# Patient Record
Sex: FEMALE | Race: WHITE | NOT HISPANIC OR LATINO | Employment: FULL TIME | URBAN - METROPOLITAN AREA
[De-identification: names, ages, dates, MRNs, and addresses within clinical notes are randomized per-mention and may not be internally consistent; named-entity substitution may affect disease eponyms.]

---

## 2019-07-21 ENCOUNTER — HOSPITAL ENCOUNTER (EMERGENCY)
Facility: HOSPITAL | Age: 49
Discharge: HOME | End: 2019-07-21
Attending: EMERGENCY MEDICINE | Admitting: EMERGENCY MEDICINE

## 2019-07-21 ENCOUNTER — APPOINTMENT (OUTPATIENT)
Dept: GENERAL RADIOLOGY | Facility: HOSPITAL | Age: 49
End: 2019-07-21

## 2019-07-21 VITALS
RESPIRATION RATE: 20 BRPM | TEMPERATURE: 98.8 F | BODY MASS INDEX: 32.3 KG/M2 | WEIGHT: 201 LBS | HEART RATE: 87 BPM | OXYGEN SATURATION: 98 % | SYSTOLIC BLOOD PRESSURE: 137 MMHG | HEIGHT: 66 IN | DIASTOLIC BLOOD PRESSURE: 83 MMHG

## 2019-07-21 DIAGNOSIS — S86.911A KNEE STRAIN, RIGHT, INITIAL ENCOUNTER: ICD-10-CM

## 2019-07-21 DIAGNOSIS — M25.561 ARTHRALGIA OF RIGHT KNEE: ICD-10-CM

## 2019-07-21 DIAGNOSIS — S76.911A MUSCLE STRAIN OF RIGHT THIGH, INITIAL ENCOUNTER: Primary | ICD-10-CM

## 2019-07-21 PROCEDURE — 73560 X-RAY EXAM OF KNEE 1 OR 2: CPT

## 2019-07-21 PROCEDURE — 99284 EMERGENCY DEPT VISIT MOD MDM: CPT

## 2019-07-21 PROCEDURE — 73552 X-RAY EXAM OF FEMUR 2/>: CPT

## 2019-07-21 RX ORDER — METAXALONE 800 MG/1
800 TABLET ORAL 3 TIMES DAILY PRN
Qty: 21 TABLET | Refills: 0 | Status: SHIPPED | OUTPATIENT
Start: 2019-07-21 | End: 2023-02-20

## 2019-07-21 RX ORDER — ONDANSETRON 4 MG/1
4 TABLET, FILM COATED ORAL EVERY 8 HOURS PRN
COMMUNITY

## 2019-07-21 RX ORDER — GABAPENTIN 300 MG/1
300 CAPSULE ORAL 3 TIMES DAILY
COMMUNITY

## 2019-07-21 RX ORDER — CLONAZEPAM 1 MG/1
1 TABLET ORAL 2 TIMES DAILY PRN
COMMUNITY

## 2019-07-21 RX ORDER — DULOXETIN HYDROCHLORIDE 60 MG/1
60 CAPSULE, DELAYED RELEASE ORAL DAILY
COMMUNITY
End: 2023-01-23

## 2019-07-21 RX ORDER — BUTALBITAL, ACETAMINOPHEN AND CAFFEINE 50; 325; 40 MG/1; MG/1; MG/1
1 TABLET ORAL EVERY 4 HOURS PRN
COMMUNITY
End: 2023-01-23

## 2019-07-21 RX ORDER — ACETAMINOPHEN 500 MG
1000 TABLET ORAL ONCE
Status: COMPLETED | OUTPATIENT
Start: 2019-07-21 | End: 2019-07-21

## 2019-07-21 RX ORDER — BUSPIRONE HYDROCHLORIDE 10 MG/1
10 TABLET ORAL 2 TIMES DAILY
COMMUNITY

## 2019-07-21 RX ORDER — NAPROXEN 375 MG/1
375 TABLET ORAL 2 TIMES DAILY PRN
Qty: 14 TABLET | Refills: 0 | Status: SHIPPED | OUTPATIENT
Start: 2019-07-21 | End: 2020-07-30

## 2019-07-21 RX ORDER — ALBUTEROL SULFATE 90 UG/1
2 AEROSOL, METERED RESPIRATORY (INHALATION) EVERY 4 HOURS PRN
COMMUNITY

## 2019-07-21 RX ORDER — FUROSEMIDE 40 MG/1
40 TABLET ORAL DAILY PRN
COMMUNITY

## 2019-07-21 RX ORDER — PROPRANOLOL HYDROCHLORIDE 60 MG/1
60 TABLET ORAL 2 TIMES DAILY
COMMUNITY
End: 2023-02-20

## 2019-07-21 RX ORDER — ALPRAZOLAM 0.5 MG/1
0.5 TABLET ORAL NIGHTLY PRN
COMMUNITY

## 2019-07-21 RX ORDER — MELOXICAM 7.5 MG/1
7.5 TABLET ORAL DAILY
COMMUNITY

## 2019-07-21 RX ORDER — ESZOPICLONE 3 MG/1
1 TABLET, FILM COATED ORAL DAILY
COMMUNITY
End: 2023-01-24 | Stop reason: SDUPTHER

## 2019-07-21 RX ORDER — ASPIRIN 81 MG/1
81 TABLET ORAL DAILY
COMMUNITY

## 2019-07-21 RX ORDER — PRAZOSIN HYDROCHLORIDE 5 MG/1
1 CAPSULE ORAL NIGHTLY
COMMUNITY
End: 2023-01-24 | Stop reason: SDUPTHER

## 2019-07-21 RX ORDER — SUMATRIPTAN 6 MG/.5ML
6 INJECTION, SOLUTION SUBCUTANEOUS ONCE
COMMUNITY
End: 2020-07-30

## 2019-07-21 RX ORDER — RIZATRIPTAN BENZOATE 10 MG/1
10 TABLET ORAL ONCE AS NEEDED
COMMUNITY

## 2019-07-21 RX ORDER — FLUCONAZOLE 200 MG/1
1 TABLET ORAL AS NEEDED
COMMUNITY

## 2019-07-21 RX ADMIN — ACETAMINOPHEN 1000 MG: 500 TABLET, FILM COATED ORAL at 21:25

## 2020-07-29 ENCOUNTER — TELEPHONE (OUTPATIENT)
Dept: CARDIOLOGY | Facility: CLINIC | Age: 50
End: 2020-07-29
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
## 2020-07-29 NOTE — TELEPHONE ENCOUNTER
Pt called with complaints of worsening chest pain and soa and would like to be seen sooner if possible. Pt appointment moved up from August. RN advised patient to go to ER if symptoms got worse before appointment. Pt verbalized understanding.

## 2020-07-30 ENCOUNTER — CONSULT (OUTPATIENT)
Dept: CARDIOLOGY | Facility: CLINIC | Age: 50
End: 2020-07-30

## 2020-07-30 VITALS
HEART RATE: 96 BPM | DIASTOLIC BLOOD PRESSURE: 86 MMHG | WEIGHT: 214.8 LBS | OXYGEN SATURATION: 97 % | BODY MASS INDEX: 34.52 KG/M2 | HEIGHT: 66 IN | SYSTOLIC BLOOD PRESSURE: 126 MMHG

## 2020-07-30 DIAGNOSIS — E11.9 TYPE 2 DIABETES MELLITUS WITHOUT COMPLICATION, WITHOUT LONG-TERM CURRENT USE OF INSULIN (HCC): ICD-10-CM

## 2020-07-30 DIAGNOSIS — I34.1 MITRAL VALVE PROLAPSE: ICD-10-CM

## 2020-07-30 DIAGNOSIS — R00.2 PALPITATIONS: Primary | ICD-10-CM

## 2020-07-30 PROCEDURE — 99203 OFFICE O/P NEW LOW 30 MIN: CPT | Performed by: INTERNAL MEDICINE

## 2020-07-30 PROCEDURE — 93000 ELECTROCARDIOGRAM COMPLETE: CPT | Performed by: INTERNAL MEDICINE

## 2020-07-30 RX ORDER — DULOXETIN HYDROCHLORIDE 30 MG/1
30 CAPSULE, DELAYED RELEASE ORAL NIGHTLY
COMMUNITY
End: 2023-01-23

## 2020-07-30 RX ORDER — DIPHENHYDRAMINE HCL 25 MG
25 TABLET ORAL NIGHTLY PRN
COMMUNITY

## 2020-07-30 RX ORDER — FERROUS SULFATE TAB EC 324 MG (65 MG FE EQUIVALENT) 324 (65 FE) MG
324 TABLET DELAYED RESPONSE ORAL 2 TIMES DAILY WITH MEALS
COMMUNITY

## 2020-07-30 RX ORDER — ELETRIPTAN HYDROBROMIDE 40 MG/1
40 TABLET, FILM COATED ORAL ONCE AS NEEDED
COMMUNITY

## 2022-08-18 ENCOUNTER — HOSPITAL ENCOUNTER (EMERGENCY)
Facility: HOSPITAL | Age: 52
Discharge: LEFT BEFORE DISCHARGE | End: 2022-08-18

## 2022-08-18 VITALS
HEIGHT: 66 IN | TEMPERATURE: 97.5 F | RESPIRATION RATE: 18 BRPM | OXYGEN SATURATION: 100 % | WEIGHT: 180 LBS | DIASTOLIC BLOOD PRESSURE: 83 MMHG | SYSTOLIC BLOOD PRESSURE: 125 MMHG | BODY MASS INDEX: 28.93 KG/M2 | HEART RATE: 67 BPM

## 2022-08-18 LAB
HOLD SPECIMEN: NORMAL
HOLD SPECIMEN: NORMAL
WHOLE BLOOD HOLD COAG: NORMAL
WHOLE BLOOD HOLD SPECIMEN: NORMAL

## 2022-08-18 PROCEDURE — 99211 OFF/OP EST MAY X REQ PHY/QHP: CPT

## 2022-08-18 RX ORDER — SODIUM CHLORIDE 0.9 % (FLUSH) 0.9 %
10 SYRINGE (ML) INJECTION AS NEEDED
Status: DISCONTINUED | OUTPATIENT
Start: 2022-08-18 | End: 2022-08-18 | Stop reason: HOSPADM

## 2022-08-19 LAB — HOLD SPECIMEN: NORMAL

## 2023-01-23 ENCOUNTER — LAB (OUTPATIENT)
Dept: LAB | Facility: HOSPITAL | Age: 53
End: 2023-01-23
Payer: COMMERCIAL

## 2023-01-23 ENCOUNTER — OFFICE VISIT (OUTPATIENT)
Dept: INTERNAL MEDICINE | Facility: CLINIC | Age: 53
End: 2023-01-23
Payer: COMMERCIAL

## 2023-01-23 VITALS
HEART RATE: 96 BPM | DIASTOLIC BLOOD PRESSURE: 80 MMHG | WEIGHT: 193 LBS | SYSTOLIC BLOOD PRESSURE: 114 MMHG | BODY MASS INDEX: 32.15 KG/M2 | HEIGHT: 65 IN | TEMPERATURE: 98 F | OXYGEN SATURATION: 98 %

## 2023-01-23 DIAGNOSIS — F51.01 PRIMARY INSOMNIA: ICD-10-CM

## 2023-01-23 DIAGNOSIS — R63.4 WEIGHT LOSS: ICD-10-CM

## 2023-01-23 DIAGNOSIS — Z13.21 ENCOUNTER FOR VITAMIN DEFICIENCY SCREENING: ICD-10-CM

## 2023-01-23 DIAGNOSIS — M32.9 LUPUS: ICD-10-CM

## 2023-01-23 DIAGNOSIS — G47.30 SLEEP APNEA, UNSPECIFIED TYPE: ICD-10-CM

## 2023-01-23 DIAGNOSIS — Z13.220 LIPID SCREENING: ICD-10-CM

## 2023-01-23 DIAGNOSIS — M79.7 FIBROMYALGIA: ICD-10-CM

## 2023-01-23 DIAGNOSIS — D25.9 UTERINE LEIOMYOMA, UNSPECIFIED LOCATION: ICD-10-CM

## 2023-01-23 DIAGNOSIS — N28.89 LEFT KIDNEY MASS: ICD-10-CM

## 2023-01-23 DIAGNOSIS — F32.A DEPRESSION, UNSPECIFIED DEPRESSION TYPE: ICD-10-CM

## 2023-01-23 DIAGNOSIS — E11.9 TYPE 2 DIABETES MELLITUS WITHOUT COMPLICATION, WITHOUT LONG-TERM CURRENT USE OF INSULIN: ICD-10-CM

## 2023-01-23 DIAGNOSIS — F41.9 ANXIETY: Primary | ICD-10-CM

## 2023-01-23 DIAGNOSIS — F43.10 PTSD (POST-TRAUMATIC STRESS DISORDER): ICD-10-CM

## 2023-01-23 DIAGNOSIS — E10.69 TYPE 1 DIABETES MELLITUS WITH OTHER SPECIFIED COMPLICATION: ICD-10-CM

## 2023-01-23 DIAGNOSIS — I34.1 MITRAL VALVE PROLAPSE: ICD-10-CM

## 2023-01-23 DIAGNOSIS — R10.2 PELVIC PAIN: ICD-10-CM

## 2023-01-23 DIAGNOSIS — J45.909 MODERATE ASTHMA, UNSPECIFIED WHETHER COMPLICATED, UNSPECIFIED WHETHER PERSISTENT: ICD-10-CM

## 2023-01-23 DIAGNOSIS — G24.9 DYSTONIA: ICD-10-CM

## 2023-01-23 DIAGNOSIS — M06.9 RHEUMATOID ARTHRITIS, INVOLVING UNSPECIFIED SITE, UNSPECIFIED WHETHER RHEUMATOID FACTOR PRESENT: ICD-10-CM

## 2023-01-23 DIAGNOSIS — G43.009 MIGRAINE WITHOUT AURA AND WITHOUT STATUS MIGRAINOSUS, NOT INTRACTABLE: ICD-10-CM

## 2023-01-23 LAB
25(OH)D3 SERPL-MCNC: 35.4 NG/ML (ref 30–100)
ALBUMIN SERPL-MCNC: 3.8 G/DL (ref 3.5–5.2)
ALBUMIN UR-MCNC: <1.2 MG/DL
ALBUMIN/GLOB SERPL: 1.3 G/DL
ALP SERPL-CCNC: 72 U/L (ref 39–117)
ALT SERPL W P-5'-P-CCNC: 7 U/L (ref 1–33)
AMPHET+METHAMPHET UR QL: NEGATIVE
AMPHETAMINES UR QL: NEGATIVE
ANION GAP SERPL CALCULATED.3IONS-SCNC: 10.6 MMOL/L (ref 5–15)
AST SERPL-CCNC: 15 U/L (ref 1–32)
BARBITURATES UR QL SCN: NEGATIVE
BASOPHILS # BLD AUTO: 0.03 10*3/MM3 (ref 0–0.2)
BASOPHILS NFR BLD AUTO: 0.5 % (ref 0–1.5)
BENZODIAZ UR QL SCN: NEGATIVE
BILIRUB SERPL-MCNC: <0.2 MG/DL (ref 0–1.2)
BUN SERPL-MCNC: 13 MG/DL (ref 6–20)
BUN/CREAT SERPL: 16.3 (ref 7–25)
BUPRENORPHINE SERPL-MCNC: NEGATIVE NG/ML
CALCIUM SPEC-SCNC: 8.8 MG/DL (ref 8.6–10.5)
CANNABINOIDS SERPL QL: NEGATIVE
CHLORIDE SERPL-SCNC: 106 MMOL/L (ref 98–107)
CHOLEST SERPL-MCNC: 155 MG/DL (ref 0–200)
CO2 SERPL-SCNC: 24.4 MMOL/L (ref 22–29)
COCAINE UR QL: NEGATIVE
CREAT SERPL-MCNC: 0.8 MG/DL (ref 0.57–1)
DEPRECATED RDW RBC AUTO: 40.4 FL (ref 37–54)
EGFRCR SERPLBLD CKD-EPI 2021: 89.9 ML/MIN/1.73
EOSINOPHIL # BLD AUTO: 0.05 10*3/MM3 (ref 0–0.4)
EOSINOPHIL NFR BLD AUTO: 0.9 % (ref 0.3–6.2)
ERYTHROCYTE [DISTWIDTH] IN BLOOD BY AUTOMATED COUNT: 14.1 % (ref 12.3–15.4)
GLOBULIN UR ELPH-MCNC: 3 GM/DL
GLUCOSE SERPL-MCNC: 80 MG/DL (ref 65–99)
HBA1C MFR BLD: 5 % (ref 4.8–5.6)
HCT VFR BLD AUTO: 35.9 % (ref 34–46.6)
HDLC SERPL-MCNC: 39 MG/DL (ref 40–60)
HGB BLD-MCNC: 11.8 G/DL (ref 12–15.9)
IMM GRANULOCYTES # BLD AUTO: 0.01 10*3/MM3 (ref 0–0.05)
IMM GRANULOCYTES NFR BLD AUTO: 0.2 % (ref 0–0.5)
LDLC SERPL CALC-MCNC: 80 MG/DL (ref 0–100)
LDLC/HDLC SERPL: 1.86 {RATIO}
LYMPHOCYTES # BLD AUTO: 2.07 10*3/MM3 (ref 0.7–3.1)
LYMPHOCYTES NFR BLD AUTO: 36.5 % (ref 19.6–45.3)
MCH RBC QN AUTO: 26 PG (ref 26.6–33)
MCHC RBC AUTO-ENTMCNC: 32.9 G/DL (ref 31.5–35.7)
MCV RBC AUTO: 79.2 FL (ref 79–97)
METHADONE UR QL SCN: NEGATIVE
MONOCYTES # BLD AUTO: 0.33 10*3/MM3 (ref 0.1–0.9)
MONOCYTES NFR BLD AUTO: 5.8 % (ref 5–12)
NEUTROPHILS NFR BLD AUTO: 3.18 10*3/MM3 (ref 1.7–7)
NEUTROPHILS NFR BLD AUTO: 56.1 % (ref 42.7–76)
NRBC BLD AUTO-RTO: 0 /100 WBC (ref 0–0.2)
OPIATES UR QL: NEGATIVE
OXYCODONE UR QL SCN: NEGATIVE
PCP UR QL SCN: NEGATIVE
PLATELET # BLD AUTO: 262 10*3/MM3 (ref 140–450)
PMV BLD AUTO: 9.9 FL (ref 6–12)
POTASSIUM SERPL-SCNC: 4.4 MMOL/L (ref 3.5–5.2)
PROPOXYPH UR QL: NEGATIVE
PROT SERPL-MCNC: 6.8 G/DL (ref 6–8.5)
RBC # BLD AUTO: 4.53 10*6/MM3 (ref 3.77–5.28)
SODIUM SERPL-SCNC: 141 MMOL/L (ref 136–145)
T3FREE SERPL-MCNC: 2.82 PG/ML (ref 2–4.4)
T4 FREE SERPL-MCNC: 1.24 NG/DL (ref 0.93–1.7)
TRICYCLICS UR QL SCN: NEGATIVE
TRIGL SERPL-MCNC: 217 MG/DL (ref 0–150)
TSH SERPL DL<=0.05 MIU/L-ACNC: 2.42 UIU/ML (ref 0.27–4.2)
VIT B12 BLD-MCNC: 622 PG/ML (ref 211–946)
VLDLC SERPL-MCNC: 36 MG/DL (ref 5–40)
WBC NRBC COR # BLD: 5.67 10*3/MM3 (ref 3.4–10.8)

## 2023-01-23 PROCEDURE — 99417 PROLNG OP E/M EACH 15 MIN: CPT | Performed by: PHYSICIAN ASSISTANT

## 2023-01-23 PROCEDURE — 80061 LIPID PANEL: CPT

## 2023-01-23 PROCEDURE — 99205 OFFICE O/P NEW HI 60 MIN: CPT | Performed by: PHYSICIAN ASSISTANT

## 2023-01-23 PROCEDURE — 80306 DRUG TEST PRSMV INSTRMNT: CPT

## 2023-01-23 PROCEDURE — 84481 FREE ASSAY (FT-3): CPT

## 2023-01-23 PROCEDURE — 84439 ASSAY OF FREE THYROXINE: CPT

## 2023-01-23 PROCEDURE — 83036 HEMOGLOBIN GLYCOSYLATED A1C: CPT

## 2023-01-23 PROCEDURE — 82043 UR ALBUMIN QUANTITATIVE: CPT

## 2023-01-23 PROCEDURE — 80050 GENERAL HEALTH PANEL: CPT

## 2023-01-23 PROCEDURE — 82607 VITAMIN B-12: CPT

## 2023-01-23 PROCEDURE — 82306 VITAMIN D 25 HYDROXY: CPT

## 2023-01-23 RX ORDER — INSULIN GLARGINE 100 [IU]/ML
10 INJECTION, SOLUTION SUBCUTANEOUS 2 TIMES DAILY
COMMUNITY
Start: 2023-01-04

## 2023-01-23 RX ORDER — NITROGLYCERIN 0.6 MG/1
TABLET SUBLINGUAL
COMMUNITY

## 2023-01-23 RX ORDER — TRAMADOL HYDROCHLORIDE 50 MG/1
TABLET ORAL
COMMUNITY

## 2023-01-23 RX ORDER — HYDROXYZINE HYDROCHLORIDE 10 MG/1
TABLET, FILM COATED ORAL EVERY 4 HOURS PRN
COMMUNITY

## 2023-01-23 RX ORDER — INSULIN LISPRO 100 [IU]/ML
INJECTION, SOLUTION INTRAVENOUS; SUBCUTANEOUS
COMMUNITY
Start: 2023-01-04

## 2023-01-23 RX ORDER — SUMATRIPTAN 100 MG/1
TABLET, FILM COATED ORAL ONCE AS NEEDED
COMMUNITY
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
## 2023-01-24 ENCOUNTER — TELEPHONE (OUTPATIENT)
Dept: INTERNAL MEDICINE | Facility: CLINIC | Age: 53
End: 2023-01-24

## 2023-01-24 DIAGNOSIS — F51.01 PRIMARY INSOMNIA: Primary | ICD-10-CM

## 2023-01-24 RX ORDER — ESZOPICLONE 3 MG/1
3 TABLET, FILM COATED ORAL DAILY
Qty: 10 TABLET | Refills: 0 | Status: SHIPPED | OUTPATIENT
Start: 2023-01-24 | End: 2023-01-24 | Stop reason: SDUPTHER

## 2023-01-24 RX ORDER — PRAZOSIN HYDROCHLORIDE 5 MG/1
5 CAPSULE ORAL NIGHTLY
Status: CANCELLED | OUTPATIENT
Start: 2023-01-24

## 2023-01-24 RX ORDER — PRAZOSIN HYDROCHLORIDE 5 MG/1
5 CAPSULE ORAL NIGHTLY
Qty: 10 CAPSULE | Refills: 0 | Status: SHIPPED | OUTPATIENT
Start: 2023-01-24 | End: 2023-01-24 | Stop reason: SDUPTHER

## 2023-01-24 RX ORDER — ESZOPICLONE 3 MG/1
3 TABLET, FILM COATED ORAL DAILY
Qty: 30 TABLET | Refills: 2 | Status: SHIPPED | OUTPATIENT
Start: 2023-02-03 | End: 2023-04-04 | Stop reason: SDUPTHER

## 2023-01-24 RX ORDER — PRAZOSIN HYDROCHLORIDE 5 MG/1
5 CAPSULE ORAL NIGHTLY
Qty: 90 CAPSULE | Refills: 1 | Status: SHIPPED | OUTPATIENT
Start: 2023-01-24 | End: 2023-01-24

## 2023-01-24 RX ORDER — ESZOPICLONE 3 MG/1
3 TABLET, FILM COATED ORAL DAILY
Qty: 10 TABLET | Refills: 0 | Status: SHIPPED | OUTPATIENT
Start: 2023-01-24 | End: 2023-02-20 | Stop reason: ALTCHOICE

## 2023-01-24 RX ORDER — PRAZOSIN HYDROCHLORIDE 5 MG/1
5 CAPSULE ORAL NIGHTLY
Qty: 10 CAPSULE | Refills: 0 | Status: SHIPPED | OUTPATIENT
Start: 2023-01-24 | End: 2023-04-04 | Stop reason: SDUPTHER

## 2023-01-24 RX ORDER — ESZOPICLONE 3 MG/1
3 TABLET, FILM COATED ORAL DAILY
Qty: 30 TABLET | Refills: 3 | Status: CANCELLED | OUTPATIENT
Start: 2023-01-24

## 2023-01-24 NOTE — TELEPHONE ENCOUNTER
Patient verbalized understanding. When you send in prescriptions she needs a 10 day sent to local pharmacy and 90 day supplies sent to mail order. She also mentioned her mychart does not work properly and would like a lab letter mailed to her when completed.

## 2023-01-24 NOTE — TELEPHONE ENCOUNTER
Caller: Sigrid Bello    Relationship: Self    Best call back number: [phone]    Requested Prescriptions: PATIENT STATES HOWARD SANCHEZ WAS SENDING IN MEDICATIONS FOR HER    Pharmacy where request should be sent:  [facility] [place], [place] - [address] - [phone]  - [phone] FX  [phone] FOR THE 10 DAY SUPPLY AND [facility] ORDER FOR THE 90 DAY SUPPLY    Additional details provided by patient: PATIENT ASKING FOR A 90 DAY TO [email or web address] AND A 10 DAY TO [facility] [place], [place] - [address] - [phone] PH - [phone] FX  [phone]    Does the patient have less than a 3 day supply:  [x] Yes  [] No      Alba Green Rep   01/24/23 09:19 EST

## 2023-01-30 ENCOUNTER — TELEPHONE (OUTPATIENT)
Dept: INTERNAL MEDICINE | Facility: CLINIC | Age: 53
End: 2023-01-30
Payer: COMMERCIAL

## 2023-01-30 RX ORDER — PROCHLORPERAZINE 25 MG/1
1 SUPPOSITORY RECTAL SEE ADMIN INSTRUCTIONS
Qty: 1 EACH | Refills: 0 | Status: SHIPPED | OUTPATIENT
Start: 2023-01-30

## 2023-01-30 RX ORDER — PROCHLORPERAZINE 25 MG/1
1 SUPPOSITORY RECTAL SEE ADMIN INSTRUCTIONS
Qty: 1 EACH | Refills: 0 | Status: SHIPPED | OUTPATIENT
Start: 2023-01-30 | End: 2023-02-27

## 2023-01-30 NOTE — TELEPHONE ENCOUNTER
Patient called and stated that she got a flu vaccine and shingles vaccine yesterday at [facility] yesterday.  She says she woke up this morning and her whole right arm has redness, swelling and hot sensation to it.     I offered her an appointment however she stated that she has to work until 9pm tonight and she also declined an appointment for tomorrow.    Patient stated that she would go to the ER or Urgent Care after she gets off from work.

## 2023-01-30 NOTE — TELEPHONE ENCOUNTER
Caller: Sigrid Bello    Relationship: Self    Best call back number: [phone]    Requested Prescriptions:   DEXCOM SENSORS  DEXCOM TRANSMITTER    Pharm[facility] where request should be sent[facility] [place] [facility] - [address] [facility] AT PORTAL TO REGISTERED [facility] SITES - [phone]  - [phone] FX     Additional details provided by patient: PATIENT HAS AT LEAST 3 DAYS LEFT    Does the patient have less than a 3 day supply:  [x] Yes  [] No    Would you like a call back once the refill request has been completed: [x] Yes [] No    If the office needs to give you a call back, can they leave a voicemail: [x] Yes [] No    Alba Estrella Rep   01/30/23 09:14 EST

## 2023-01-30 NOTE — TELEPHONE ENCOUNTER
Caller: Sigrid Bello    Relationship: Self    Best call back number: [phone]    Caller requesting test results: SIGRID    What test was performed: BLOOD WORK    When was the test performed: 1/23/23    Where was the test performed: OFFICE LAB    Additional notes: PATIENT WOULD LIKE HER RESULTS MAILED TO HER ADDRESS ON FILE

## 2023-01-31 ENCOUNTER — PATIENT ROUNDING (BHMG ONLY) (OUTPATIENT)
Dept: INTERNAL MEDICINE | Facility: CLINIC | Age: 53
End: 2023-01-31
Payer: COMMERCIAL

## 2023-01-31 ENCOUNTER — TELEPHONE (OUTPATIENT)
Dept: INTERNAL MEDICINE | Facility: CLINIC | Age: 53
End: 2023-01-31

## 2023-01-31 NOTE — TELEPHONE ENCOUNTER
Caller: [facility] - [place], [place] - [address] - [phone] [facility] [phone] FX    Relationship: Pharmacy    Best call back number: [phone]    What medications are you currently taking:   Current Outpatient Medications on File Prior to Visit   Medication Sig Dispense Refill   • Acetaminophen (TYLENOL 8 HOUR PO) Take 1 tablet by mouth As Needed.     • albuterol sulfate  (90 Base) MCG/ACT inhaler Inhale 2 puffs Every 4 (Four) Hours As Needed for Wheezing.     • ALPRAZolam (XANAX) 0.5 MG tablet Take 0.5 mg by mouth At Night As Needed.     • aspirin 81 MG EC tablet Take 81 mg by mouth Daily.     • busPIRone (BUSPAR) 10 MG tablet Take 10 mg by mouth 2 (Two) Times a Day.     • clonazePAM (KlonoPIN) 1 MG tablet Take 1 mg by mouth 2 (Two) Times a Day As Needed for Seizures.     • Continuous Blood Gluc  (Dexcom G6 ) device 1 Device See Admin Instructions. 1 each 0   • Continuous Blood Gluc Transmit (Dexcom G6 Transmitter) misc 1 Device See Admin Instructions. 1 each 0   • diphenhydrAMINE (BENADRYL) 25 MG tablet Take 25 mg by mouth At Night As Needed for Itching.     • diphenhydrAMINE-APAP, sleep, (TYLENOL PM EXTRA STRENGTH PO) Take 1 tablet by mouth As Needed.     • eletriptan (RELPAX) 40 MG tablet Take 40 mg by mouth 1 (One) Time As Needed for Migraine. may repeat in 2 hours if necessary     • EPINEPHrine 0.1 MG/0.1ML solution auto-injector Inject  as directed.     • [START ON 2/3/2023] eszopiclone (LUNESTA) 3 MG tablet Take 1 tablet by mouth Daily. 30 tablet 2   • eszopiclone (LUNESTA) 3 MG tablet Take 1 tablet by mouth Daily. 10 tablet 0   • ferrous sulfate 324 (65 Fe) MG tablet delayed-release EC tablet Take 324 mg by mouth 2 (Two) Times a Day With Meals. 2 tabs BID     • fluconazole (DIFLUCAN) 200 MG tablet Take 1 tablet by mouth As Needed.     • fluticasone-salmeterol (ADVAIR) 250-50 MCG/DOSE DISKUS Inhale 2 puffs As Needed.     • furosemide (LASIX) 40 MG tablet Take  40 mg by mouth Daily As Needed.     • gabapentin (NEURONTIN) 300 MG capsule Take 300 mg by mouth 2 (Two) Times a Day.     • glucagon (GLUCAGEN) 1 MG injection Glucagon Emergency Kit 1 mg solution for injection     • HumaLOG KwikPen 100 UNIT/ML solution pen-injector      • hydrOXYzine (ATARAX) 10 MG tablet hydroxyzine HCl 10 mg tablet     • Insulin Glargine (BASAGLAR KWIKPEN) 100 UNIT/ML injection pen      • meloxicam (MOBIC) 7.5 MG tablet Take 7.5 mg by mouth Daily.     • metaxalone (SKELAXIN) 800 MG tablet Take 1 tablet by mouth 3 (Three) Times a Day As Needed for Muscle Spasms. 21 tablet 0   • nitroglycerin (NITROSTAT) 0.6 MG SL tablet place 1 tablet (0.6 mg) by sublingual route at the first sign of an attack; may repeat every 5 minutes, until relief; if pain persists after a total of 3 tablets in 15 minutes, prompt medical attention is recommended     • ondansetron (ZOFRAN) 4 MG tablet Take 4 mg by mouth Every 8 (Eight) Hours As Needed for Nausea or Vomiting.     • Potassium Chloride (KLOR-CON 10 PO) Take 1 tablet by mouth 2 (two) times a day.     • prazosin (MINIPRESS) 5 MG capsule Take 1 capsule by mouth Every Night. 10 capsule 0   • propranolol (INDERAL) 60 MG tablet Take 60 mg by mouth 2 (Two) Times a Day.     • rizatriptan (MAXALT) 10 MG tablet Take 10 mg by mouth 1 (One) Time As Needed for Migraine. May repeat in 2 hours if needed     • SUMAtriptan (IMITREX) 100 MG tablet sumatriptan 100 mg tablet     • traMADol (ULTRAM) 50 MG tablet tramadol 50 mg tablet     • ubrogepant 100 MG tablet Ubrelvy 100 mg tablet   Take by oral route for 25 days.     • Unable to find 1 each As Needed. Med Name: Hump Gummies       No current facility-administered medications on file prior to visit.          When did you start taking these medications:     Which medication are you concerned about: DEXCOM SENSORS     Who prescribed you this medication:     What are your concerns: PHARMACY IS CALLING AND STATES THEY HAVE PUT A PRIOR  AUTHORIZATION FOR THIS BUT WOULD LIKE A CALL BACK TO ASK A CLINICAL QUESTIONNAIRE ABOUT THIS.      How long have you had these concerns:

## 2023-01-31 NOTE — TELEPHONE ENCOUNTER
Caller: Sigrid Bello    Relationship: Self    Best call back number: [phone]    What medication is requested: DEXCOM SENSOR AND TRANSMITTER    When is it needed: AS SOON AS POSSIBLE.     Where is the test/procedure going to be performed: PATIENT IS REQUESTING THE SENSOR AND TRANSMITTER BE SENT TO THE [facility] AND A     SHORT TERM SUPPLY OF THE TRANSMITTER SENT TO [facility] ON [address], [place] [address]    Additional information or concerns: PLEASE ADVISE PATIENT VIA VOICEMAIL WHEN THIS HAS BEEN SUBMITTED. PATIENT ALSO REQUESTED THAT WE BRENDA THE PRIOR AUTHORIZATION REQUEST AS URGENT.

## 2023-02-01 ENCOUNTER — TELEPHONE (OUTPATIENT)
Dept: INTERNAL MEDICINE | Facility: CLINIC | Age: 53
End: 2023-02-01
Payer: COMMERCIAL

## 2023-02-01 DIAGNOSIS — E10.69 TYPE 1 DIABETES MELLITUS WITH OTHER SPECIFIED COMPLICATION: Primary | ICD-10-CM

## 2023-02-01 RX ORDER — PROCHLORPERAZINE 25 MG/1
SUPPOSITORY RECTAL
Qty: 3 EACH | Refills: 0 | Status: SHIPPED | OUTPATIENT
Start: 2023-02-01 | End: 2023-02-27

## 2023-02-01 RX ORDER — PROCHLORPERAZINE 25 MG/1
SUPPOSITORY RECTAL
Qty: 9 EACH | Refills: 4 | Status: SHIPPED | OUTPATIENT
Start: 2023-02-01 | End: 2023-02-01 | Stop reason: SDUPTHER

## 2023-02-01 NOTE — TELEPHONE ENCOUNTER
Pt called and wanted to know the status of her Dexcom PA. I advised that her insurance had called us this morning and I would work on it right away for her. She also stated she needed her sensors sent in to both her mail order and a short supply to her local. I sent both of those. I advised that I would call her after I spoke with her insurance and she stated that I could leave a detailed message. I called her insurance and got approval on all of her Dexcom supplies. I LM advising pt that every thing was approved and she could  her short supply of sensors if needed.

## 2023-02-01 NOTE — TELEPHONE ENCOUNTER
KOTA BARNES HAS CALLED AND STATED PRIOR AUTHORIZATION HAS BEEN STARTED AND STATES SHE IS REQUESTING A CALL BACK DUE TO CLINICAL QUESTIONS NOT BEING ANSWERED.    CALL BACK NUMBER [phone] OPTION 1

## 2023-02-02 ENCOUNTER — TELEPHONE (OUTPATIENT)
Dept: INTERNAL MEDICINE | Facility: CLINIC | Age: 53
End: 2023-02-02
Payer: COMMERCIAL

## 2023-02-02 NOTE — TELEPHONE ENCOUNTER
Pt stated she is going back to school and would need clearance stating it is ok for her to return since she has hearing loss in her left ear.

## 2023-02-02 NOTE — TELEPHONE ENCOUNTER
Caller: Sigrid Bello    Relationship: Self    Best call back number: [phone]    What form or medical record are you requesting: MEDICAL CLEARANCE    Who is requesting this form or medical record from you: [facility]    How would you like to receive the form or medical records (pick-up, mail, fax): MAIL  If mail, what is the address:   [address] [place] [id number]    Timeframe paperwork needed: ASAP    Additional notes: PATIENT IS TRYING TO RETURN BACK TO SCHOOL AND NEEDS A LETTER FROM HER PCP ON A [facility] LETTER HEAD GIVING HER MEDICAL CLEARANCE

## 2023-02-03 NOTE — TELEPHONE ENCOUNTER
"Called and left detailed voicemail advising the below, requesting a callback for follow-up to go over verbally:    \"I did not see her for hearing loss or evaluate her for hearing loss.  I can not clear her for anything related to this. \"    Advised to callback to schedule an appt on this  "

## 2023-02-20 ENCOUNTER — OFFICE VISIT (OUTPATIENT)
Dept: CARDIOLOGY | Facility: CLINIC | Age: 53
End: 2023-02-20
Payer: COMMERCIAL

## 2023-02-20 VITALS
SYSTOLIC BLOOD PRESSURE: 98 MMHG | OXYGEN SATURATION: 97 % | BODY MASS INDEX: 30.53 KG/M2 | HEART RATE: 68 BPM | HEIGHT: 66 IN | DIASTOLIC BLOOD PRESSURE: 64 MMHG | WEIGHT: 190 LBS

## 2023-02-20 DIAGNOSIS — I49.3 PVC'S (PREMATURE VENTRICULAR CONTRACTIONS): ICD-10-CM

## 2023-02-20 DIAGNOSIS — I34.0 NON-RHEUMATIC MITRAL REGURGITATION: ICD-10-CM

## 2023-02-20 DIAGNOSIS — I34.1 MITRAL VALVE PROLAPSE: Primary | ICD-10-CM

## 2023-02-20 PROCEDURE — 99214 OFFICE O/P EST MOD 30 MIN: CPT | Performed by: INTERNAL MEDICINE

## 2023-02-20 PROCEDURE — 93000 ELECTROCARDIOGRAM COMPLETE: CPT | Performed by: INTERNAL MEDICINE

## 2023-02-20 RX ORDER — METOPROLOL TARTRATE 50 MG/1
25 TABLET, FILM COATED ORAL 2 TIMES DAILY
Qty: 90 TABLET | Refills: 3 | Status: SHIPPED | OUTPATIENT
Start: 2023-02-20 | End: 2023-03-09 | Stop reason: SDUPTHER

## 2023-02-20 RX ORDER — BUTALBITAL, ASPIRIN AND CAFFEINE 50; 325; 40 MG/1; MG/1; MG/1
1 TABLET ORAL EVERY MORNING
COMMUNITY

## 2023-02-20 RX ORDER — ERGOCALCIFEROL 1.25 MG/1
50000 CAPSULE ORAL WEEKLY
COMMUNITY

## 2023-02-20 RX ORDER — ERENUMAB-AOOE 140 MG/ML
INJECTION, SOLUTION SUBCUTANEOUS
COMMUNITY

## 2023-02-20 NOTE — PROGRESS NOTES
[facility]  [address], [address]  [place], [place], [address]    [phone]  [email or web address]           OUTPATIENT CLINIC CONSULTATION NOTE    Patient care team:  Patient Care Team:  Jacinda Hines PA-C as PCP - General (Physician Assistant)  Ferny Collado MD as Consulting Physician (Cardiology)    Requesting Provider and Reason for consultation: The patient is being seen today at the request of No ref. provider found for mitral valve prolapse.     Subjective:   Chief complaint:   Chief Complaint   Patient presents with   • Mitral Valve Prolapse       HPI:    Sigrid Bello is a 52 y.o. female.  Cardiac focused problem list:  1. Mitral valve prolapse  a. Remote diagnosis in 2003  b. Echocardiogram 6/2008: No MVP, mild MR  c. Echocardiogram 3/2009:  Mild MVP, trace MR.   d. Echo, 3/2010:  Mild MVP with trace MR, unchanged.   e. Echo 11/2019:  Mild bileaflet prolapse. No significant MR.   2. PVCs  a. Holter monitor 11/2019:  Frequent PVCs.   3. Chest pain  a. Stress echo, 8/2008:  Negative for ischemia   b. Dobutamine stress echo, 5/2016:  Negative for ischemia. Normal LVEF.   4. Hyperlipidemia   5. Possible history of atrial fibrillation  6. Type II diabetes mellitus  7. Lupus   8. Fibromyalgia   9. Asthma   10. Anxiety   11. PTSD   12. Left kidney mass  a. Angiomyolipoma of left kidney, followed by Dr. Truong in [place].  b. 9/2019:  Embolization of left angiomyolipoma, Dr. Lau in [place].     Patient presents today for consultation to establish care.      Moved from [place] 3 years ago.  Followed by cardiology there.  Later followed by [facility]    The patient has had some confusion regarding her palpitations and beta-blocker management.  Was on metoprolol per [facility].  This controlled her palpitations.  Was switched to propranolol by her psychiatrist.  The thought being that it might help her PTSD more than metoprolol.  This is not the case.  She  did not have worsening of her mood on metoprolol.  Her palpitations were less controlled on propranolol.  She wishes to switch back to metoprolol.  She has been off of both of them for the last few weeks.  Palpitations have increased.  Rare chest pains.  No significant shortness of air    Review of Systems:  As noted above in the HPI    PFSH:  Patient Active Problem List   Diagnosis   • Mitral valve prolapse   • Fibromyalgia   • PTSD (post-traumatic stress disorder)   • Lupus ([facility])   • Asthma   • Migraine without aura   • Dystonia   • Type 1 diabetes mellitus with other specified complication ([facility])   • Primary insomnia   • Anxiety   • Uterine fibroid   • Pelvic pain   • Left kidney mass   • Non-rheumatic mitral regurgitation   • PVC's (premature ventricular contractions)         Current Outpatient Medications:   •  Acetaminophen (TYLENOL 8 HOUR PO), Take 1 tablet by mouth As Needed., Disp: , Rfl:   •  albuterol sulfate  (90 Base) MCG/ACT inhaler, Inhale 2 puffs Every 4 (Four) Hours As Needed for Wheezing., Disp: , Rfl:   •  ALPRAZolam (XANAX) 0.5 MG tablet, Take 0.5 mg by mouth At Night As Needed., Disp: , Rfl:   •  aspirin 81 MG EC tablet, Take 81 mg by mouth Daily., Disp: , Rfl:   •  butalbital-aspirin-caffeine (FIORINAL) -40 MG per tablet, Take 1 tablet by mouth Every Morning., Disp: , Rfl:   •  clonazePAM (KlonoPIN) 1 MG tablet, Take 1 mg by mouth 2 (Two) Times a Day As Needed for Seizures., Disp: , Rfl:   •  Continuous Blood Gluc  (Dexcom G6 ) device, 1 Device See Admin Instructions., Disp: 1 each, Rfl: 0  •  Continuous Blood Gluc Sensor (Dexcom G6 Sensor), Every 10 (Ten) Days. Use as directed for continuous glucose monitoring-change every 10 days, Disp: 3 each, Rfl: 0  •  Continuous Blood Gluc Transmit (Dexcom G6 Transmitter) misc, 1 Device See Admin Instructions., Disp: 1 each, Rfl: 0  •  diphenhydrAMINE (BENADRYL) 25 MG tablet, Take 25 mg by mouth At Night As Needed for Itching.,  Disp: , Rfl:   •  eletriptan (RELPAX) 40 MG tablet, Take 40 mg by mouth 1 (One) Time As Needed for Migraine. may repeat in 2 hours if necessary, Disp: , Rfl:   •  EPINEPHrine 0.1 MG/0.1ML solution auto-injector, Inject  as directed., Disp: , Rfl:   •  Erenumab-aooe (Aimovig) 140 MG/ML auto-injector, Every 30 (Thirty) Days., Disp: , Rfl:   •  eszopiclone (LUNESTA) 3 MG tablet, Take 1 tablet by mouth Daily., Disp: 30 tablet, Rfl: 2  •  ferrous sulfate 324 (65 Fe) MG tablet delayed-release EC tablet, Take 324 mg by mouth 2 (Two) Times a Day With Meals. 2 tabs BID, Disp: , Rfl:   •  fluconazole (DIFLUCAN) 200 MG tablet, Take 1 tablet by mouth As Needed., Disp: , Rfl:   •  fluticasone-salmeterol (ADVAIR) 250-50 MCG/DOSE DISKUS, Inhale 2 puffs As Needed., Disp: , Rfl:   •  gabapentin (NEURONTIN) 300 MG capsule, Take 300 mg by mouth 3 (Three) Times a Day., Disp: , Rfl:   •  glucagon (GLUCAGEN) 1 MG injection, Glucagon Emergency Kit 1 mg solution for injection, Disp: , Rfl:   •  HumaLOG KwikPen 100 UNIT/ML solution pen-injector, Sliding scale, Disp: , Rfl:   •  hydrOXYzine (ATARAX) 10 MG tablet, Every 4 (Four) Hours As Needed., Disp: , Rfl:   •  Insulin Glargine (BASAGLAR KWIKPEN) 100 UNIT/ML injection pen, 10 Units 2 (Two) Times a Day., Disp: , Rfl:   •  Magnesium 200 MG chewable tablet, Chew Daily., Disp: , Rfl:   •  meloxicam (MOBIC) 7.5 MG tablet, Take 7.5 mg by mouth Daily., Disp: , Rfl:   •  nitroglycerin (NITROSTAT) 0.6 MG SL tablet, place 1 tablet (0.6 mg) by sublingual route at the first sign of an attack; may repeat every 5 minutes, until relief; if pain persists after a total of 3 tablets in 15 minutes, prompt medical attention is recommended, Disp: , Rfl:   •  ondansetron (ZOFRAN) 4 MG tablet, Take 4 mg by mouth Every 8 (Eight) Hours As Needed for Nausea or Vomiting., Disp: , Rfl:   •  Potassium Chloride (KLOR-CON 10 PO), Take 1 tablet by mouth 2 (two) times a day., Disp: , Rfl:   •  prazosin (MINIPRESS) 5 MG  capsule, Take 1 capsule by mouth Every Night., Disp: 10 capsule, Rfl: 0  •  rizatriptan (MAXALT) 10 MG tablet, Take 10 mg by mouth 1 (One) Time As Needed for Migraine. May repeat in 2 hours if needed, Disp: , Rfl:   •  SUMAtriptan (IMITREX) 100 MG tablet, 1 (One) Time As Needed., Disp: , Rfl:   •  traMADol (ULTRAM) 50 MG tablet, tramadol 50 mg tablet, Disp: , Rfl:   •  ubrogepant (UBRELVY) 100 MG tablet, Ubrelvy 100 mg tablet  Take by oral route for 25 days., Disp: , Rfl:   •  Unable to find, 1 each As Needed. Med Name: Hemp Gummies, Disp: , Rfl:   •  vitamin D (ERGOCALCIFEROL) 1.25 MG (01244 UT) capsule capsule, Take 50,000 Units by mouth 1 (One) Time Per Week., Disp: , Rfl:   •  busPIRone (BUSPAR) 10 MG tablet, Take 10 mg by mouth 2 (Two) Times a Day., Disp: , Rfl:   •  eszopiclone (LUNESTA) 3 MG tablet, Take 1 tablet by mouth Daily., Disp: 10 tablet, Rfl: 0  •  furosemide (LASIX) 40 MG tablet, Take 40 mg by mouth Daily As Needed., Disp: , Rfl:   •  propranolol (INDERAL) 60 MG tablet, Take 60 mg by mouth 2 (Two) Times a Day., Disp: , Rfl:     Allergies   Allergen Reactions   • Meperidine Anaphylaxis and Unknown (See Comments)   • Morphine Shortness Of Breath and Unknown (See Comments)   • Mushroom Anaphylaxis   • Peanut Oil Anaphylaxis   • Penicillins Unknown (See Comments) and Anaphylaxis     Childhood reaction   • Clindamycin Hives   • Iodine Hives   • Cortisone Rash and Unknown (See Comments)   • Other Other (See Comments)       Social History     Socioeconomic History   • Marital status: Single   Tobacco Use   • Smoking status: Never   • Smokeless tobacco: Never   Vaping Use   • Vaping Use: Never used   Substance and Sexual Activity   • Alcohol use: Yes     Alcohol/week: 1.0 standard drink     Types: 1 Glasses of wine per week     Comment: occas   • Drug use: No   • Sexual activity: Defer     History reviewed. No pertinent family history.      Objective:   Physical Exam:  BP 98/64 (BP Location: Left arm,  "Patient Position: Sitting)   Pulse 68   Ht 167.6 cm (66\")   Wt 86.2 kg (190 lb)   SpO2 97%   BMI 30.67 kg/m²   CONSTITUTIONAL: No acute distress  RESPIRATORY: Normal effort. Clear to auscultation bilaterally without wheezing or rales  CARDIOVASCULAR: Regular rate and rhythm with normal S1 and S2. Without murmur.  PERIPHERAL VASCULAR: No carotid bruit bilaterally.  Normal radial pulse.     Labs:  Labs reviewed by myself    Lab Results   Component Value Date    CHOL 155 01/23/2023     Lab Results   Component Value Date    TRIG 217 (H) 01/23/2023     Lab Results   Component Value Date    HDL 39 (L) 01/23/2023     Lab Results   Component Value Date    LDL 80 01/23/2023     No components found for: LDLDIRECTC    Diagnostic Data:      ECG 12 Lead    Date/Time: 2/20/2023 9:13 AM  Performed by: Ferny Collado MD  Authorized by: Ferny Collado MD   Comparison: compared with previous ECG from 10/30/2019  Similar to previous ECG  Rhythm: sinus rhythm            TTE 11/04/2019  · Normal left ventricular size; normal right ventricular size; normal left atrial size and normal right atrial size.   · Normal left ventricular systolic function and regional wall motion.   · Normal diastolic function.   · Mild bileaflet mitral valve prolapse without significant regurgitation.   · Unable to estimate pulmonary artery pressures.   · Estimated EF is 60-65%      Assessment and Plan:     Mitral valve prolapse  Non-rheumatic mitral regurgitation  PVC's (premature ventricular contractions)   -48-hour Holter to reestablish PVC burden  -Repeat echocardiogram to reassess mitral regurgitation/mitral prolapse  -Formally discontinuing propranolol  -Start metoprolol tartrate 25 mg twice a day  -If systolic blood pressure averages above 100 mmHg and palpitations persist, increase metoprolol to 50 mg twice a day.  We will call the patient next week to touch base.  -Encouraged improved cardiac fitness, heart healthy diet    - Return in about 6 " months (around 8/20/2023) for Next scheduled follow up, or sooner if needed.

## 2023-02-27 DIAGNOSIS — E11.9 TYPE 2 DIABETES MELLITUS WITHOUT COMPLICATION, WITHOUT LONG-TERM CURRENT USE OF INSULIN: Primary | ICD-10-CM

## 2023-02-27 RX ORDER — FLASH GLUCOSE SENSOR
KIT MISCELLANEOUS
Qty: 3 EACH | Refills: 5 | Status: SHIPPED | OUTPATIENT
Start: 2023-02-27

## 2023-02-27 NOTE — TELEPHONE ENCOUNTER
Requested Prescriptions:   FREESTYLE BIJAN 2    Pharmacy where request should be sent: [facility] [address] [place] [address] [phone] [facility] [phone] FX     Additional details provided by patient: PATIENT ADVISED INSURANCE WOULD COVER FREESTYLE BIJAN 2, AND IF SHE TRIES THAT THEY WILL TURN AROUND AND COVER DEXCOM    Does the patient have less than a 3 day supply:  [x] Yes  [] No      Zach Guy, PCT   02/27/23 09:29 EST

## 2023-02-28 NOTE — TELEPHONE ENCOUNTER
Spoke with pharmacy yesterday and they stated they haven't gotten the e scribe order in yet. But when they get it they will run the free style Rx to see if it will be cheaper for the pt

## 2023-03-09 ENCOUNTER — TELEPHONE (OUTPATIENT)
Dept: CARDIOLOGY | Facility: CLINIC | Age: 53
End: 2023-03-09
Payer: COMMERCIAL

## 2023-03-09 RX ORDER — METOPROLOL TARTRATE 50 MG/1
50 TABLET, FILM COATED ORAL 2 TIMES DAILY
Qty: 180 TABLET | Refills: 3 | Status: SHIPPED | OUTPATIENT
Start: 2023-03-09

## 2023-03-09 NOTE — TELEPHONE ENCOUNTER
Patients BP averaging 169/101 HR 81. Patient advised to increase metoprolol to 50 mg BID. Patient agreeable to plan, all questions answered at this time.

## 2023-03-24 ENCOUNTER — TELEPHONE (OUTPATIENT)
Dept: INTERNAL MEDICINE | Facility: CLINIC | Age: 53
End: 2023-03-24

## 2023-03-24 NOTE — TELEPHONE ENCOUNTER
She will need to have an updated ultrasound before we send an urgent referral for uterine mass.  I will send the order for the ultrasound.  We will start there.

## 2023-03-24 NOTE — TELEPHONE ENCOUNTER
Called and spoke with patient. She states her last scan was about 2 years ago noting the uterine mass. Recently she has started having severe pain and pressure with certain positions. Due to the patients work schedule the soonest appt I made with you was 04/11/23 at 8:45a.     She states she is also in need of referrals for obgyn, pain management, and endocrinology as well.

## 2023-03-24 NOTE — TELEPHONE ENCOUNTER
Caller: ZOË    Relationship: Other NURSE  WITH [facility] call back number:     What is the medical concern/diagnosis: MASS IN UTERUS     What specialty or service is being requested: GYN    What is the provider, practice or medical service name: [facility]    What is the office location: [place]     What is the office phone number:     Any additional details: ZOË FROM [facility] CALLED AND ADVISED THE PATIENT STATED SHE HAS A UTERINE MASS, UNSURE OF WHICH KIND OF MASS; PATIENT HAS REACHED OUT TO 10 DIFFERENT OBGYNS AND THEY ARE BOOKED OUT FOR 10 MTHS; PLEASE CALL PATIENT AND ALSO ZOË TO ADVISE OF A REFERRAL

## 2023-03-24 NOTE — TELEPHONE ENCOUNTER
How does she know she has a mass?  Did she have an ultrasound or CT scan somewhere recently?  If not, I can order the test so we can figure out what kind of mass it is.

## 2023-03-27 DIAGNOSIS — R10.2 PELVIC PAIN: Primary | ICD-10-CM

## 2023-03-27 NOTE — TELEPHONE ENCOUNTER
Patient returned call and I read her Jacinda's message verbatim, she verbalized understanding    She would like a call back with instructions for scheduling/being seen for her ultrasound    I do not see a US order placed in the patient's chart, has this been ordered?    She states we can leave a VM with this information if she does not

## 2023-03-28 NOTE — TELEPHONE ENCOUNTER
Patient called to check on the status of her ultrasound and I let her know the referral was placed and gave her the phone number for central scheduling to call and schedule, she verbalized understanding    She also wanted to follow up on her requested referral for pain management, I do not see where one has been placed but she states she had requested it

## 2023-03-29 ENCOUNTER — TELEPHONE (OUTPATIENT)
Dept: INTERNAL MEDICINE | Facility: CLINIC | Age: 53
End: 2023-03-29
Payer: COMMERCIAL

## 2023-03-29 NOTE — TELEPHONE ENCOUNTER
Patient called to see if we were able to fill out her leave forms for headache and also mentioned fibromyalgia pain in regards to her job, states that she is not seeing another provider managing that condition yet as she is still trying to get in to see a neurologist    I gave her both of our fax numbers and she stated she would fax the forms to both

## 2023-03-30 DIAGNOSIS — M79.7 FIBROMYALGIA: Primary | ICD-10-CM

## 2023-03-30 NOTE — TELEPHONE ENCOUNTER
Called and talked to pt - she does work from home, but said she has a migraine and fibromyalgia flare. Advised per Jacinda's message. She said she would send the forms to her doctor through her insurance. Notified Jacinda

## 2023-03-30 NOTE — TELEPHONE ENCOUNTER
I have seen patient one time to establish care and she informed me she works from home.  I do not fill out leave forms for the conditions listed.

## 2023-04-04 DIAGNOSIS — F51.01 PRIMARY INSOMNIA: ICD-10-CM

## 2023-04-04 RX ORDER — PRAZOSIN HYDROCHLORIDE 5 MG/1
5 CAPSULE ORAL NIGHTLY
Qty: 10 CAPSULE | Refills: 0 | Status: SHIPPED | OUTPATIENT
Start: 2023-04-04

## 2023-04-04 RX ORDER — ESZOPICLONE 3 MG/1
3 TABLET, FILM COATED ORAL DAILY
Qty: 30 TABLET | Refills: 2 | Status: SHIPPED | OUTPATIENT
Start: 2023-04-04

## 2023-04-04 NOTE — TELEPHONE ENCOUNTER
Rx Refill Note  Requested Prescriptions     Pending Prescriptions Disp Refills   • prazosin (MINIPRESS) 5 MG capsule 10 capsule 0     Sig: Take 1 capsule by mouth Every Night.   • eszopiclone (LUNESTA) 3 MG tablet 30 tablet 2     Sig: Take 1 tablet by mouth Daily.      Last office visit with prescribing clinician: 1/23/2023   Last telemedicine visit with prescribing clinician: 4/11/2023   Next office visit with prescribing clinician: 4/11/2023                         Would you like a call back once the refill request has been completed: [] Yes [] No    If the office needs to give you a call back, can they leave a voicemail: [] Yes [] No    Laurie Her MA  04/04/23, 12:15 EDT

## 2023-04-12 ENCOUNTER — E-VISIT (OUTPATIENT)
Dept: ADMINISTRATIVE | Facility: OTHER | Age: 53
End: 2023-04-12
Payer: COMMERCIAL

## 2023-04-12 NOTE — E-VISIT ESCALATED
Chief Complaint: Low back pain   Patient was shown the following escalation message:   Some conditions need immediate, in-person medical attention   Back pain with difficulty going to the bathroom or controlling your bladder or bowels may suggest a more serious condition. You should get care right now.   ----------   Patient Interview Transcript:   Where on your lower back do you feel pain? This interview treats low back pain only. Select one.    Both sides   Not selected:    Right side    Left side    Along the spine or bony part of my back   Since your back pain started, have you had any of these stomach- or bladder-related symptoms? Select all that apply.    Nausea    Pelvic or lower abdominal pain   Not selected:    Abdominal pain or discomfort    Vomiting    Pain that's worse after eating    Burning with urination    Frequent urination    Blood in your urine    None of these   Do any of these statements apply to you? Select all that apply.    None of the above   Not selected:    I've been taking oral steroids such as prednisone for a long time    I have a bruise or scrape on my spine where the pain is    I have osteoporosis   How long have you had your current episode of back pain? Select one.    More than 6 weeks   Not selected:    Less than 1 week    1 to 2 weeks    3 to 4 weeks    5 to 6 weeks   What does the pain feel like? Select one.    Other (please describe): Depends if I have to move   Not selected:    Dull or aching    Sharp, shooting, stabbing, or burning   How severe is your back pain? Think about how the pain affects your everyday activities. On a scale of 1 to 10, for example, how difficult is it to get out of bed, get dressed, shower, or go to work or school? Select one.    Severe, 7 to 9; my pain is distressing, and it limits me from doing some everyday activities   Not selected:    Mild, 1 to 3; my pain is noticeable and distracting, but I am still able to do everyday activities    Moderate,  4 to 6; my pain is strong, and it makes everyday activities uncomfortable    Unbearable, 10+; my pain is so intense that it keeps me from doing almost all everyday activities   Does the pain travel down from your lower back to your buttock, thigh, lower leg, or foot? Select one.    No   Not selected:    Yes, it travels down my right side    Yes, it travels down my left side    Yes, it travels down both sides   Since your back pain started, have you had numbness or loss of feeling in the pattern as shown?    No   Not selected:    Yes    I have an underlying condition that causes chronic numbness or loss of feeling in that area   Does your back pain get worse at night or when you're lying down? Select one.    No   Not selected:    Yes   Do any of these make your back pain worse? Select all that apply.    Bending over    Standing up from a bent over position    Twisting to the side   Not selected:    Sitting in one position for a long time    Being on my feet for a long time    Physical activity or exercise    Leaning to the side    Coughing or sneezing    None of the above   Do any of these help your back feel better? Select all that apply.    None of the above   Not selected:    Resting or lying down    Frequently changing positions    Physical activity or exercise    Stretching   In the last 2 weeks, have you experienced any of these? Select all that apply.    Dressed more slowly than usual because of back pain    Walked only short distances because of back pain   Not selected:    Experienced pain in the shoulder or neck area    None of these   Since your back pain started, have you stumbled or fallen because of problems with balance or coordination? Select one.    I have an underlying condition that causes problems with my balance or coordination   Not selected:    Yes    No    I have an underlying condition that prevents me from standing or walking   Along with your back pain, have you noticed a loss of strength  in your legs? Select one.    No   Not selected:    Yes, but only in one leg    Yes, in both legs    I have limited or no strength in my legs because of a chronic underlying condition   Since your back pain started, have you had any of these symptoms? Back pain doesn't usually come with other symptoms that affect your whole body. Select all that apply.    Unexplained fever    Severe fatigue that doesn't improve with rest   Not selected:    Unintentional weight loss    Drenching night sweats    None of these   Since your back pain began, have you noticed any loss of bowel or bladder function? This includes urinating or having a bowel movement when you didn't mean to. It also includes feeling like you can't urinate or empty your bladder all the way. Select one.    Yes   Not selected:    No   ----------   Medical history   Medical history data does not currently exist for this patient.

## 2023-04-26 ENCOUNTER — TELEMEDICINE (OUTPATIENT)
Dept: INTERNAL MEDICINE | Facility: CLINIC | Age: 53
End: 2023-04-26
Payer: MEDICAID

## 2023-04-26 DIAGNOSIS — R56.9 SEIZURES: Primary | ICD-10-CM

## 2023-04-26 DIAGNOSIS — R11.0 NAUSEA: ICD-10-CM

## 2023-04-26 DIAGNOSIS — G43.009 MIGRAINE WITHOUT AURA AND WITHOUT STATUS MIGRAINOSUS, NOT INTRACTABLE: ICD-10-CM

## 2023-04-26 DIAGNOSIS — I10 HYPERTENSION, UNSPECIFIED TYPE: ICD-10-CM

## 2023-04-26 RX ORDER — ONDANSETRON 4 MG/1
4 TABLET, FILM COATED ORAL EVERY 8 HOURS PRN
Qty: 30 TABLET | Refills: 0 | Status: SHIPPED | OUTPATIENT
Start: 2023-04-26

## 2023-04-26 RX ORDER — PRAZOSIN HYDROCHLORIDE 5 MG/1
5 CAPSULE ORAL NIGHTLY
Qty: 30 CAPSULE | Refills: 0 | Status: SHIPPED | OUTPATIENT
Start: 2023-04-26

## 2023-04-26 NOTE — PROGRESS NOTES
"     Telehealth Visit     Date: 2023   Patient Name: Sigrid Bello  : 1970   MRN: [id number]     Chief Complaint:  No chief complaint on file.      This provider is located at the [facility] in [place], [place]. The patient is being seen remotely via telehealth at their home address in [place], and stated they are in a secure environment for this session. The patient's condition being diagnosed/treated is appropriate for telemedicine. The provider identified herself as well as her credentials. The patient, and/or patients guardian, consent to be seen remotely, and when consent is given they understand that the consent allows for patient identifiable information to be sent to a third party as needed. They may refuse to be seen remotely at any time. The electronic data is encrypted and password protected, and the patient and/or guardian has been advised of the potential risks to privacy not withstanding such measures.    You have chosen to receive care through a telehealth visit. Do you consent to use a video/audio connection for your medical care today? Yes    History of Present Illness: Sigrid Bello is a 52 y.o. female who is here today to follow up with a recent ER visit for seizure activity at home.    Sigrid states that on 2023 she had 3 seizure episodes.  States that she went to the ER and was monitored then later discharged.  She was told to follow-up with neurology, however the appointment is not until next month.    Sigrid reports that she has had seizures in the past, not currently under the care of a neurologist as hers [place] of [place].  She has been taking gabapentin \"for years\" for her seizure activity.     Within the last several weeks, she has begun to have nausea and vomiting which she believes is the origin of her seizures, not being able to keep down the gabapentin.  She currently has a uterine mass that this may be attributed to. "  She has an appointment on 5/3/2023 with Dr. Canavan at [facility] to address this.    She also reports that her migraines are not controlled.  She has a prescription for sumatriptan which is not effective for migraine activity.  She also recently tried Nurtec, which is not effective for her migraines either.  She states that they are almost daily and causing her the inability to work.    She is in the process of getting a leave of absence from her place of employment however, needs paperwork from the [facility] filled out and returned.    Subjective      Review of Systems   Gastrointestinal: Positive for nausea and vomiting.   Neurological: Positive for seizures and headaches.   All other systems reviewed and are negative.      I have reviewed and the following portions of the patient's history were updated as appropriate: past family history, past medical history, past social history, past surgical history and problem list.    Past Medical History:   Diagnosis Date   • Anemia    • Anxiety and depression    • Arthritis    • Asthma    • Blastomycosis    • Diabetes mellitus    • Fibromyalgia    • Hypertension    • Hypokalemia    • Lupus    • Migraine    • MVP (mitral valve prolapse)    • PTSD (post-traumatic stress disorder)    • Tachycardia    • Uterine mass        Past Surgical History:   Procedure Laterality Date   • ANKLE SURGERY      both ankles rebuilt   • INNER EAR SURGERY     • KNEE SURGERY     • TEETH EXTRACTION     • UVULECTOMY         Social History     Socioeconomic History   • Marital status: Single   Tobacco Use   • Smoking status: Never   • Smokeless tobacco: Never   Vaping Use   • Vaping Use: Never used   Substance and Sexual Activity   • Alcohol use: Yes     Alcohol/week: 1.0 standard drink     Types: 1 Glasses of wine per week     Comment: occas   • Drug use: No   • Sexual activity: Defer         Current Outpatient Medications:   •  ondansetron (ZOFRAN) 4 MG tablet, Take 1 tablet  by mouth Every 8 (Eight) Hours As Needed for Nausea or Vomiting., Disp: 30 tablet, Rfl: 0  •  prazosin (MINIPRESS) 5 MG capsule, Take 1 capsule by mouth Every Night., Disp: 30 capsule, Rfl: 0  •  ubrogepant (UBRELVY) 100 MG tablet, Take 1 tablet by mouth 1 (One) Time As Needed (migraine) for up to 30 doses., Disp: 30 tablet, Rfl: 0  •  Acetaminophen (TYLENOL 8 HOUR PO), Take 1 tablet by mouth As Needed., Disp: , Rfl:   •  albuterol sulfate  (90 Base) MCG/ACT inhaler, Inhale 2 puffs Every 4 (Four) Hours As Needed for Wheezing., Disp: , Rfl:   •  ALPRAZolam (XANAX) 0.5 MG tablet, Take 0.5 mg by mouth At Night As Needed., Disp: , Rfl:   •  aspirin 81 MG EC tablet, Take 81 mg by mouth Daily., Disp: , Rfl:   •  busPIRone (BUSPAR) 10 MG tablet, Take 10 mg by mouth 2 (Two) Times a Day., Disp: , Rfl:   •  butalbital-aspirin-caffeine (FIORINAL) -40 MG per tablet, Take 1 tablet by mouth Every Morning., Disp: , Rfl:   •  clonazePAM (KlonoPIN) 1 MG tablet, Take 1 mg by mouth 2 (Two) Times a Day As Needed for Seizures., Disp: , Rfl:   •  Continuous Blood Gluc  (FreeStyle Suma 2 [place]) device, 1 Device 2 (Two) Times a Day., Disp: 1 each, Rfl: 2  •  Continuous Blood Gluc Sensor (FreeStyle Suma Sensor System), Every 10 (Ten) Days., Disp: 3 each, Rfl: 5  •  Continuous Blood Gluc Transmit (Dexcom G6 Transmitter) misc, 1 Device See Admin Instructions., Disp: 1 each, Rfl: 0  •  diphenhydrAMINE (BENADRYL) 25 MG tablet, Take 25 mg by mouth At Night As Needed for Itching., Disp: , Rfl:   •  eletriptan (RELPAX) 40 MG tablet, Take 40 mg by mouth 1 (One) Time As Needed for Migraine. may repeat in 2 hours if necessary, Disp: , Rfl:   •  EPINEPHrine 0.1 MG/0.1ML solution auto-injector, Inject  as directed., Disp: , Rfl:   •  Erenumab-aooe (Aimovig) 140 MG/ML auto-injector, Every 30 (Thirty) Days., Disp: , Rfl:   •  eszopiclone (LUNESTA) 3 MG tablet, Take 1 tablet by mouth Daily., Disp: 30 tablet, Rfl: 2  •  ferrous  sulfate 324 (65 Fe) MG tablet delayed-release EC tablet, Take 324 mg by mouth 2 (Two) Times a Day With Meals. 2 tabs BID, Disp: , Rfl:   •  fluconazole (DIFLUCAN) 200 MG tablet, Take 1 tablet by mouth As Needed., Disp: , Rfl:   •  fluticasone-salmeterol (ADVAIR) 250-50 MCG/DOSE DISKUS, Inhale 2 puffs As Needed., Disp: , Rfl:   •  furosemide (LASIX) 40 MG tablet, Take 40 mg by mouth Daily As Needed., Disp: , Rfl:   •  gabapentin (NEURONTIN) 300 MG capsule, Take 300 mg by mouth 3 (Three) Times a Day., Disp: , Rfl:   •  glucagon (GLUCAGEN) 1 MG injection, Glucagon Emergency Kit 1 mg solution for injection, Disp: , Rfl:   •  HumaLOG KwikPen 100 UNIT/ML solution pen-injector, Sliding scale, Disp: , Rfl:   •  hydrOXYzine (ATARAX) 10 MG tablet, Every 4 (Four) Hours As Needed., Disp: , Rfl:   •  Insulin Glargine (BASAGLAR KWIKPEN) 100 UNIT/ML injection pen, 10 Units 2 (Two) Times a Day., Disp: , Rfl:   •  Magnesium 200 MG chewable tablet, Chew Daily., Disp: , Rfl:   •  meloxicam (MOBIC) 7.5 MG tablet, Take 7.5 mg by mouth Daily., Disp: , Rfl:   •  metoprolol tartrate (LOPRESSOR) 50 MG tablet, Take 1 tablet by mouth 2 (Two) Times a Day., Disp: 180 tablet, Rfl: 3  •  nitroglycerin (NITROSTAT) 0.6 MG SL tablet, place 1 tablet (0.6 mg) by sublingual route at the first sign of an attack; may repeat every 5 minutes, until relief; if pain persists after a total of 3 tablets in 15 minutes, prompt medical attention is recommended, Disp: , Rfl:   •  Potassium Chloride (KLOR-CON 10 PO), Take 1 tablet by mouth 2 (two) times a day., Disp: , Rfl:   •  rizatriptan (MAXALT) 10 MG tablet, Take 10 mg by mouth 1 (One) Time As Needed for Migraine. May repeat in 2 hours if needed, Disp: , Rfl:   •  SUMAtriptan (IMITREX) 100 MG tablet, 1 (One) Time As Needed., Disp: , Rfl:   •  traMADol (ULTRAM) 50 MG tablet, tramadol 50 mg tablet, Disp: , Rfl:   •  Unable to find, 1 each As Needed. Med Name: Key Juarez, Disp: , Rfl:   •  vitamin D  (ERGOCALCIFEROL) 1.25 MG (81478 UT) capsule capsule, Take 50,000 Units by mouth 1 (One) Time Per Week., Disp: , Rfl:     Objective     Physical Exam:  Vital Signs: There were no vitals filed for this visit.  There is no height or weight on file to calculate BMI.    Physical Exam    Assessment / Plan      Diagnoses and all orders for this visit:    1. Seizures (Primary)  -     Ambulatory Referral to Neurology   -Discussed gabapentin, stating that I do not believe it is the cause of the nausea and vomiting being that she has been taking this for many years.  Encouraged to continue to take gabapentin until seen by neurology.    2. Migraine without aura and without status migrainosus, not intractable  -     ubrogepant (UBRELVY) 100 MG tablet; Take 1 tablet by mouth 1 (One) Time As Needed (migraine) for up to 30 doses.  Dispense: 30 tablet; Refill: 0  -Discussed use of sumatriptan and Nurtec.  States ineffective.      3. Nausea  -     ondansetron (ZOFRAN) 4 MG tablet; Take 1 tablet by mouth Every 8 (Eight) Hours As Needed for Nausea or Vomiting.  Dispense: 30 tablet; Refill: 0  -Discussed with patient eating bland, small, frequent meals.  Encouraged to increase oral fluids to remain hydrated.    4. Hypertension, unspecified type  -     prazosin (MINIPRESS) 5 MG capsule; Take 1 capsule by mouth Every Night.  Dispense: 30 capsule; Refill: 0    -Encouraged patient to come to office for in person visit to address many healthcare needs.  States that she recently canceled an appointment in the office due to waking up sick.  -Follow-up with Jacinda Chirinos of [place] paperwork.    Follow Up:   No follow-ups on file.    Any medications prescribed have been sent electronically to   [facility] - [place], [place] - [address] - [phone]  - [phone]   [address]  [address]  [place], located within [facility] [address]  Phone: [phone] Fax: [phone]    [facility] - [place], [place] - [address] -  [phone]  - [phone] FX  [address]  [address]  [facility] [address]  Phone: [phone] Fax: [phone]      25 minutes were spent reviewing the patient's questionnaire, formulating a treatment plan, and relaying information to the patient via [employer].    TAWNY Davidson    Part of this note may be an electronic transcription/translation of spoken language to printed text using the Dragon Dictation System.

## 2023-05-01 RX ORDER — ALPRAZOLAM 0.5 MG/1
0.5 TABLET ORAL NIGHTLY PRN
OUTPATIENT
Start: 2023-05-01

## 2023-05-01 NOTE — TELEPHONE ENCOUNTER
Caller: Sigrid Bello JOSE ARMANDO    Relationship: Self    Best call back number: [phone]    What medications are you currently taking:   Current Outpatient Medications on File Prior to Visit   Medication Sig Dispense Refill   • Acetaminophen (TYLENOL 8 HOUR PO) Take 1 tablet by mouth As Needed.     • albuterol sulfate  (90 Base) MCG/ACT inhaler Inhale 2 puffs Every 4 (Four) Hours As Needed for Wheezing.     • ALPRAZolam (XANAX) 0.5 MG tablet Take 0.5 mg by mouth At Night As Needed.     • aspirin 81 MG EC tablet Take 81 mg by mouth Daily.     • busPIRone (BUSPAR) 10 MG tablet Take 10 mg by mouth 2 (Two) Times a Day.     • butalbital-aspirin-caffeine (FIORINAL) -40 MG per tablet Take 1 tablet by mouth Every Morning.     • clonazePAM (KlonoPIN) 1 MG tablet Take 1 mg by mouth 2 (Two) Times a Day As Needed for Seizures.     • Continuous Blood Gluc  (FreeStyle Suma 2 [place]) device 1 Device 2 (Two) Times a Day. 1 each 2   • Continuous Blood Gluc Sensor (FreeStyle Suma Sensor System) Every 10 (Ten) Days. 3 each 5   • Continuous Blood Gluc Transmit (Dexcom G6 Transmitter) misc 1 Device See Admin Instructions. 1 each 0   • diphenhydrAMINE (BENADRYL) 25 MG tablet Take 25 mg by mouth At Night As Needed for Itching.     • eletriptan (RELPAX) 40 MG tablet Take 40 mg by mouth 1 (One) Time As Needed for Migraine. may repeat in 2 hours if necessary     • EPINEPHrine 0.1 MG/0.1ML solution auto-injector Inject  as directed.     • Erenumab-aooe (Aimovig) 140 MG/ML auto-injector Every 30 (Thirty) Days.     • eszopiclone (LUNESTA) 3 MG tablet Take 1 tablet by mouth Daily. 30 tablet 2   • ferrous sulfate 324 (65 Fe) MG tablet delayed-release EC tablet Take 324 mg by mouth 2 (Two) Times a Day With Meals. 2 tabs BID     • fluconazole (DIFLUCAN) 200 MG tablet Take 1 tablet by mouth As Needed.     • fluticasone-salmeterol (ADVAIR) 250-50 MCG/DOSE DISKUS Inhale 2 puffs As Needed.     • furosemide (LASIX) 40 MG tablet  Take 40 mg by mouth Daily As Needed.     • gabapentin (NEURONTIN) 300 MG capsule Take 300 mg by mouth 3 (Three) Times a Day.     • glucagon (GLUCAGEN) 1 MG injection Glucagon Emergency Kit 1 mg solution for injection     • HumaLOG KwikPen 100 UNIT/ML solution pen-injector Sliding scale     • hydrOXYzine (ATARAX) 10 MG tablet Every 4 (Four) Hours As Needed.     • Insulin Glargine (BASAGLAR KWIKPEN) 100 UNIT/ML injection pen 10 Units 2 (Two) Times a Day.     • Magnesium 200 MG chewable tablet Chew Daily.     • meloxicam (MOBIC) 7.5 MG tablet Take 7.5 mg by mouth Daily.     • metoprolol tartrate (LOPRESSOR) 50 MG tablet Take 1 tablet by mouth 2 (Two) Times a Day. 180 tablet 3   • nitroglycerin (NITROSTAT) 0.6 MG SL tablet place 1 tablet (0.6 mg) by sublingual route at the first sign of an attack; may repeat every 5 minutes, until relief; if pain persists after a total of 3 tablets in 15 minutes, prompt medical attention is recommended     • ondansetron (ZOFRAN) 4 MG tablet Take 1 tablet by mouth Every 8 (Eight) Hours As Needed for Nausea or Vomiting. 30 tablet 0   • Potassium Chloride (KLOR-CON 10 PO) Take 1 tablet by mouth 2 (two) times a day.     • prazosin (MINIPRESS) 5 MG capsule Take 1 capsule by mouth Every Night. 30 capsule 0   • rizatriptan (MAXALT) 10 MG tablet Take 10 mg by mouth 1 (One) Time As Needed for Migraine. May repeat in 2 hours if needed     • SUMAtriptan (IMITREX) 100 MG tablet 1 (One) Time As Needed.     • traMADol (ULTRAM) 50 MG tablet tramadol 50 mg tablet     • ubrogepant (UBRELVY) 100 MG tablet Take 1 tablet by mouth 1 (One) Time As Needed (migraine) for up to 30 doses. 30 tablet 0   • Unable to find 1 each As Needed. Med Name: Hemp Gummies     • vitamin D (ERGOCALCIFEROL) 1.25 MG (83813 UT) capsule capsule Take 50,000 Units by mouth 1 (One) Time Per Week.       No current facility-administered medications on file prior to visit.            Which medication are you concerned about:  UBRELVY    Who prescribed you this medication: ANURAG MCCARTHY    What are your concerns: PATIENT CALLED CHECKING THE STATUS OF PRIOR AUTHORIZATION ON MEDICATION. PLEASE ADVISE

## 2023-05-01 NOTE — TELEPHONE ENCOUNTER
Rx Refill Note  Requested Prescriptions     Pending Prescriptions Disp Refills   • ALPRAZolam (XANAX) 0.5 MG tablet       Sig: Take 1 tablet by mouth At Night As Needed.      Last refill:  Listed as a historical med so no date on file.   Last office visit with prescribing clinician: 1/23/2023   Last telemedicine visit with prescribing clinician: Visit date not found   Next office visit with prescribing clinician: Visit date not found                           Bernice Pardo RN  05/01/23, 11:59 EDT

## 2023-05-01 NOTE — TELEPHONE ENCOUNTER
Caller: EugeniaSigrid ness    Relationship: Self    Best call back number: [phone]    Requested Prescriptions:   Requested Prescriptions     Pending Prescriptions Disp Refills   • ALPRAZolam (XANAX) 0.5 MG tablet       Sig: Take 1 tablet by mouth At Night As Needed.        Pharmacy where request should be sent:    [facility] [phone]  Last office visit with prescribing clinician: 1/23/2023   Last telemedicine visit with prescribing clinician: Visit date not found   Next office visit with prescribing clinician: Visit date not found     Additional details provided by patient: PATIENT HAS 2 PILLS LEFT     Does the patient have less than a 3 day supply:  [x] Yes  [] No    Would you like a call back once the refill request has been completed: [x] Yes [] No    If the office needs to give you a call back, can they leave a voicemail: [x] Yes [] No    Alba Thompson Rep   05/01/23 11:54 EDT

## 2023-05-01 NOTE — TELEPHONE ENCOUNTER
Caller: Sigrid Bello    Relationship: Self    Best call back number: [phone]    What is the medical concern/diagnosis: HEART ISSUES, FIBROMYALGIA    What specialty or service is being requested: HOME HEALTH    Any additional details: PATIENT HAS NO STRENGTH TO DO ANYTHING AND NEEDS HELP WITH HOME HEALTH.

## 2023-05-03 ENCOUNTER — TRANSCRIBE ORDERS (OUTPATIENT)
Dept: LAB | Facility: HOSPITAL | Age: 53
End: 2023-05-03
Payer: COMMERCIAL

## 2023-05-03 ENCOUNTER — TELEPHONE (OUTPATIENT)
Dept: INTERNAL MEDICINE | Facility: CLINIC | Age: 53
End: 2023-05-03

## 2023-05-03 ENCOUNTER — TELEPHONE (OUTPATIENT)
Dept: INTERNAL MEDICINE | Facility: CLINIC | Age: 53
End: 2023-05-03
Payer: COMMERCIAL

## 2023-05-03 ENCOUNTER — LAB (OUTPATIENT)
Dept: LAB | Facility: HOSPITAL | Age: 53
End: 2023-05-03
Payer: COMMERCIAL

## 2023-05-03 DIAGNOSIS — N94.89 PELVIC CONGESTION SYNDROME: ICD-10-CM

## 2023-05-03 DIAGNOSIS — D25.1 INTRAMURAL LEIOMYOMA OF UTERUS: ICD-10-CM

## 2023-05-03 DIAGNOSIS — N39.9 DISEASE OF URINARY TRACT: Primary | ICD-10-CM

## 2023-05-03 DIAGNOSIS — Z93.9: Primary | ICD-10-CM

## 2023-05-03 DIAGNOSIS — Z12.31 ENCOUNTER FOR SCREENING MAMMOGRAM FOR MALIGNANT NEOPLASM OF BREAST: Primary | ICD-10-CM

## 2023-05-03 LAB
BASOPHILS # BLD AUTO: 0.04 10*3/MM3 (ref 0–0.2)
BASOPHILS NFR BLD AUTO: 0.6 % (ref 0–1.5)
DEPRECATED RDW RBC AUTO: 42.6 FL (ref 37–54)
EOSINOPHIL # BLD AUTO: 0.06 10*3/MM3 (ref 0–0.4)
EOSINOPHIL NFR BLD AUTO: 1 % (ref 0.3–6.2)
ERYTHROCYTE [DISTWIDTH] IN BLOOD BY AUTOMATED COUNT: 14.7 % (ref 12.3–15.4)
HBA1C MFR BLD: 5.1 % (ref 4.8–5.6)
HCT VFR BLD AUTO: 40.2 % (ref 34–46.6)
HGB BLD-MCNC: 13.5 G/DL (ref 12–15.9)
IMM GRANULOCYTES # BLD AUTO: 0.01 10*3/MM3 (ref 0–0.05)
IMM GRANULOCYTES NFR BLD AUTO: 0.2 % (ref 0–0.5)
LYMPHOCYTES # BLD AUTO: 2.5 10*3/MM3 (ref 0.7–3.1)
LYMPHOCYTES NFR BLD AUTO: 40.1 % (ref 19.6–45.3)
MCH RBC QN AUTO: 27.2 PG (ref 26.6–33)
MCHC RBC AUTO-ENTMCNC: 33.6 G/DL (ref 31.5–35.7)
MCV RBC AUTO: 81 FL (ref 79–97)
MONOCYTES # BLD AUTO: 0.33 10*3/MM3 (ref 0.1–0.9)
MONOCYTES NFR BLD AUTO: 5.3 % (ref 5–12)
NEUTROPHILS NFR BLD AUTO: 3.29 10*3/MM3 (ref 1.7–7)
NEUTROPHILS NFR BLD AUTO: 52.8 % (ref 42.7–76)
NRBC BLD AUTO-RTO: 0 /100 WBC (ref 0–0.2)
PLATELET # BLD AUTO: 195 10*3/MM3 (ref 140–450)
PMV BLD AUTO: 10.4 FL (ref 6–12)
RBC # BLD AUTO: 4.96 10*6/MM3 (ref 3.77–5.28)
T4 FREE SERPL-MCNC: 1.27 NG/DL (ref 0.93–1.7)
TSH SERPL DL<=0.05 MIU/L-ACNC: 2.48 UIU/ML (ref 0.27–4.2)
WBC NRBC COR # BLD: 6.23 10*3/MM3 (ref 3.4–10.8)

## 2023-05-03 PROCEDURE — 85025 COMPLETE CBC W/AUTO DIFF WBC: CPT

## 2023-05-03 PROCEDURE — 84439 ASSAY OF FREE THYROXINE: CPT

## 2023-05-03 PROCEDURE — 84443 ASSAY THYROID STIM HORMONE: CPT

## 2023-05-03 PROCEDURE — 36415 COLL VENOUS BLD VENIPUNCTURE: CPT

## 2023-05-03 PROCEDURE — 83036 HEMOGLOBIN GLYCOSYLATED A1C: CPT

## 2023-05-03 NOTE — TELEPHONE ENCOUNTER
MMG ordered.  The note is in Epic, but not able to open?  I will need full office note and TVS report.

## 2023-05-03 NOTE — TELEPHONE ENCOUNTER
Caller: Sigrid Bello    Relationship: Self    Best call back number: [phone]    What orders are you requesting (i.e. lab or imaging): HOME HEALTH AND MAMMOGRAM        Additional notes: THE PATIENT STATES THAT SHE TALKED TO HER INSURANCE COMPANY AND THEY TOLD HER THAT THEY WILL COVER HOME HEALTH FOR THE PATIENT THE PATIENT WOULD LIKE TO GET ORDER TO START THAT . THE PATIENT WOULD ALSO LIKE TO GET AN ORDER FOR A MAMMOGRAM      THE PATIENT WOULD ALSO LIKE TO LET THE DOCTOR KNOW THAT HER GYNECOLOGIST FOUND TWO  MASSES IN HER UTERUS ONE IS SMALL AND THE OTHER IS THE SIZE OF A CANTALOUPE THE PATIENT WANTS THE DOCTOR TO KNOW THAT THE GYNECOLOGIST HAS RECOMMENDED A HYSTERECTOMY

## 2023-05-03 NOTE — TELEPHONE ENCOUNTER
THE PATIENT STATES THAT HER MEDICATION UBRELVY NEEDS A PRIOR AUTHORIZATION THE PATIENT WOULD LIKE TO GET AN UPDATE ON THAT PRIOR AUTHORIZATION     CALL [phone]

## 2023-05-03 NOTE — TELEPHONE ENCOUNTER
Fax submitted. Left voicemail w/pt advising we sent the PA request and to follow up with insurance or her pharmacy in a couple of days. They should have an update by then

## 2023-05-04 ENCOUNTER — OFFICE VISIT (OUTPATIENT)
Dept: NEUROLOGY | Facility: CLINIC | Age: 53
End: 2023-05-04
Payer: COMMERCIAL

## 2023-05-04 ENCOUNTER — OFFICE VISIT (OUTPATIENT)
Dept: INTERNAL MEDICINE | Facility: CLINIC | Age: 53
End: 2023-05-04
Payer: COMMERCIAL

## 2023-05-04 VITALS
HEART RATE: 124 BPM | DIASTOLIC BLOOD PRESSURE: 74 MMHG | WEIGHT: 182 LBS | HEIGHT: 66 IN | OXYGEN SATURATION: 98 % | SYSTOLIC BLOOD PRESSURE: 116 MMHG | BODY MASS INDEX: 29.25 KG/M2

## 2023-05-04 VITALS
DIASTOLIC BLOOD PRESSURE: 74 MMHG | SYSTOLIC BLOOD PRESSURE: 110 MMHG | HEIGHT: 66 IN | WEIGHT: 191 LBS | BODY MASS INDEX: 30.7 KG/M2 | HEART RATE: 72 BPM

## 2023-05-04 DIAGNOSIS — F41.9 ANXIETY: ICD-10-CM

## 2023-05-04 DIAGNOSIS — M79.7 FIBROMYALGIA: ICD-10-CM

## 2023-05-04 DIAGNOSIS — D25.9 UTERINE LEIOMYOMA, UNSPECIFIED LOCATION: ICD-10-CM

## 2023-05-04 DIAGNOSIS — Z79.899 HIGH RISK MEDICATION USE: ICD-10-CM

## 2023-05-04 DIAGNOSIS — B40.9 BLASTOMYCOSIS: ICD-10-CM

## 2023-05-04 DIAGNOSIS — G43.119 INTRACTABLE MIGRAINE WITH AURA WITHOUT STATUS MIGRAINOSUS: ICD-10-CM

## 2023-05-04 DIAGNOSIS — F41.9 ANXIETY: Primary | ICD-10-CM

## 2023-05-04 DIAGNOSIS — F43.10 PTSD (POST-TRAUMATIC STRESS DISORDER): ICD-10-CM

## 2023-05-04 DIAGNOSIS — R42 DIZZINESS: ICD-10-CM

## 2023-05-04 DIAGNOSIS — R56.9 GENERALIZED CONVULSIVE SEIZURES: Primary | ICD-10-CM

## 2023-05-04 DIAGNOSIS — L30.9 DERMATITIS: ICD-10-CM

## 2023-05-04 PROBLEM — E87.6 HYPOKALEMIA: Status: ACTIVE | Noted: 2023-05-04

## 2023-05-04 PROBLEM — R55 NEAR SYNCOPE: Status: ACTIVE | Noted: 2019-11-10

## 2023-05-04 PROBLEM — G40.009: Status: ACTIVE | Noted: 2023-05-04

## 2023-05-04 PROBLEM — D17.71 ANGIOMYOLIPOMA OF LEFT KIDNEY: Status: ACTIVE | Noted: 2021-07-26

## 2023-05-04 PROBLEM — E55.9 VITAMIN D DEFICIENCY: Status: ACTIVE | Noted: 2021-08-12

## 2023-05-04 PROBLEM — G40.909 SEIZURE DISORDER: Status: ACTIVE | Noted: 2021-07-26

## 2023-05-04 PROBLEM — D50.9 IRON DEFICIENCY ANEMIA: Status: ACTIVE | Noted: 2019-10-01

## 2023-05-04 PROBLEM — M06.9 RHEUMATOID ARTHRITIS: Status: ACTIVE | Noted: 2021-07-26

## 2023-05-04 PROBLEM — G47.33 OBSTRUCTIVE SLEEP APNEA SYNDROME: Status: ACTIVE | Noted: 2021-07-26

## 2023-05-04 PROBLEM — G25.81 RESTLESS LEGS SYNDROME: Status: ACTIVE | Noted: 2023-05-04

## 2023-05-04 PROBLEM — G47.9 SLEEP DISORDER: Status: ACTIVE | Noted: 2023-05-04

## 2023-05-04 PROBLEM — IMO0001 MYALGIA AND MYOSITIS: Status: ACTIVE | Noted: 2019-10-01

## 2023-05-04 PROBLEM — K76.89 OTHER CHRONIC NONALCOHOLIC LIVER DISEASE: Status: ACTIVE | Noted: 2019-10-01

## 2023-05-04 PROBLEM — D18.00 HEMANGIOMA: Status: ACTIVE | Noted: 2019-10-01

## 2023-05-04 PROCEDURE — 99214 OFFICE O/P EST MOD 30 MIN: CPT | Performed by: PHYSICIAN ASSISTANT

## 2023-05-04 PROCEDURE — 3044F HG A1C LEVEL LT 7.0%: CPT | Performed by: PHYSICIAN ASSISTANT

## 2023-05-04 RX ORDER — FREMANEZUMAB-VFRM 225 MG/1.5ML
225 INJECTION SUBCUTANEOUS
Qty: 1.5 ML | Refills: 11 | Status: SHIPPED | OUTPATIENT
Start: 2023-05-04 | End: 2024-05-03

## 2023-05-04 RX ORDER — DULOXETIN HYDROCHLORIDE 60 MG/1
60 CAPSULE, DELAYED RELEASE ORAL DAILY
COMMUNITY

## 2023-05-04 RX ORDER — LEVETIRACETAM 500 MG/1
500 TABLET ORAL 2 TIMES DAILY
Qty: 60 TABLET | Refills: 2 | Status: SHIPPED | OUTPATIENT
Start: 2023-05-04 | End: 2023-08-02

## 2023-05-04 NOTE — PROGRESS NOTES
Neuro Office Visit      Encounter Date: 2023   Patient Name: Sigrid Bello  : 1970   MRN: [id number]   PCP:  Jacinda Hines PA-C     Chief Complaint:    Chief Complaint   Patient presents with   • Seizures       History of Present Illness: Sigrid Bello is a 52 y.o. female who is here today in Neurology to establish care for seizures and migraines.   She was referred by Pamela HECTOR.    She has a past medical history of seizures, lupus, fibromyalgia, diabetes mellitus, hypertension, atrial fibrillation.  Patient was seen at [facility] on 2023 due to complaint of seizure, patient reported 3 seizures prior to presentation,  reports patient was asleep during all 3 seizures.  She apparently has been taking gabapentin for seizure disorder but it has been making her feel sick, she reports feeling lightheaded and weak.  Per her  the seizures were characterized by her face and upper body slightly shaking for a few seconds at a time, approximately 5 minutes apart for all 3 episodes, patient reports that she takes gabapentin for seizure disorder and has chronic pain.  She does not have a neurologist, she reports her neurologist moved states.  Also noted a headache along with her pain from fibromyalgia.  She had reported the gabapentin was making her feel nauseous.  While in the ED she reported that she had a migraine for which she was treated and discharged home.    Urinalysis was negative for leukocytes, nitrite, protein, glucose, ketones, 0-2 WBC, 1+ bacteria, 3-5 squamous epithelial,  WBC 6.1.  While in the ED she received Benadryl, Toradol, and Reglan for her headache.  Since discharge she has followed up with her family medicine nurse who saw her for migraines and seizures.  While at her primary care appointment she reported that her migraines are not well controlled, she has a prescription for sumatriptan which is not effective for migraine  activity, she recently tried Nurtec which was not effective, she reported that her migraines were almost daily and causing her the inability to work.  She was given prescription for Ubrelvy.     Patient states that over the last 2 weeks she has had nausea and vomiting and that she often loses her afternoon gabapentin dose secondary to vomiting, she states that she has been following with gynecology for a mass on her uterus that she has been told is cantaloupe sized, she had a biopsy yesterday and will be hearing from her gynecologist with probable plan for surgery in June.  She states that she has been on gabapentin for seizures for approximately 6 years, states that she was diagnosed with seizures of the grand mal type when she was 20 years old states that she has tried multiple medications on and off over the years, does recall that she took Topamax which did not work well to prevent seizures.  She previously received care at [facility] neurology but the provider that she is previously saw has left.  She is here today to establish new neurology care.  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  She states that she had received Botox injections for her both her spine and for migraines however she had stopped doing injections for her migraines after she had significant eye ptosis of her left eye.,  She states that she did have 6 rounds of Botox.  She was seen at [facility] on 4/24/2023 after her  witnessed her having 3 seizure episodes, he stated that they were very short in duration only a few seconds long, and that they happened in quick succession approximately 5 minutes apart.  She did have associated urinary incontinence, no tongue bite, significant other describes the episode as tightening of her arms and then generalized body shaking.  Patient also thinks that she may have had another seizure the other day.  She states that she was premature as  an infant and may have had developmental delays but she is unsure.  She has not had an EEG in over 10 years.  She does believe that [facility] did perform an MRI of her brain within the last month, this imaging was not available for review per EMR, patient is going to contact [facility] to try to get those records shared with our office.    Regarding her migraines she has had migraines for many years, she states that for the last 3 years she has kept a daily migraine, describes the pain as sharp and aching, states that it is holoacranial, it is associated with light and sound sensitivity along with nausea.  She also does have occasional aura and blurry vision with her migraine.  She does not know of any triggers states that rest does help.  She has tried several triptans including both rizatriptan and sumatriptan, her prior neurologist had recently switched her from rizatriptan to sumatriptan, she states that she has previously tried Nurtec and it did not help, most recently her primary care has prescribed Ubrelvy to be taken for abortive treatment for migraines.    She also reports very poor sleep.  She has an extensive psychiatric history and does take several psychotropic medications.  She is requesting referral to psychiatry for management of her medications.  She also has a history of fibromyalgia and is requesting referral to pain management for this.    Subjective      Review of Systems   Constitutional: Positive for appetite change and fatigue.   Eyes: Positive for photophobia.        With migraines   Respiratory: Negative.    Cardiovascular: Negative.    Gastrointestinal: Positive for nausea and vomiting.   Endocrine: Negative.    Genitourinary:        Following with GYN for concern of mass on uterus   Musculoskeletal: Positive for back pain and gait problem.   Skin: Positive for rash.   Neurological: Positive for dizziness, seizures, weakness and headaches.   Hematological: Negative.     Psychiatric/Behavioral: The patient is nervous/anxious.           Past Medical History:   Past Medical History:   Diagnosis Date   • Anemia    • Anxiety and depression    • Arthritis    • Asthma    • Blastomycosis    • Diabetes mellitus    • Fibromyalgia    • Hypertension    • Hypokalemia    • Lupus    • Migraine    • MVP (mitral valve prolapse)    • PTSD (post-traumatic stress disorder)    • Tachycardia    • Uterine mass        Past Surgical History:   Past Surgical History:   Procedure Laterality Date   • ANKLE SURGERY      both ankles rebuilt   • INNER EAR SURGERY     • KNEE SURGERY     • TEETH EXTRACTION     • UVULECTOMY         Family History: History reviewed. No pertinent family history.    Social History:   Social History     Socioeconomic History   • Marital status: Single   Tobacco Use   • Smoking status: Never   • Smokeless tobacco: Never   Vaping Use   • Vaping Use: Never used   Substance and Sexual Activity   • Alcohol use: Yes     Alcohol/week: 1.0 standard drink     Types: 1 Glasses of wine per week     Comment: occas   • Drug use: No   • Sexual activity: Defer       Medications:     Current Outpatient Medications:   •  albuterol sulfate  (90 Base) MCG/ACT inhaler, Inhale 2 puffs Every 4 (Four) Hours As Needed for Wheezing., Disp: , Rfl:   •  ALPRAZolam (XANAX) 0.5 MG tablet, Take 1 tablet by mouth At Night As Needed., Disp: , Rfl:   •  aspirin 81 MG EC tablet, Take 1 tablet by mouth Daily., Disp: , Rfl:   •  butalbital-aspirin-caffeine (FIORINAL) -40 MG per tablet, Take 1 tablet by mouth Every Morning., Disp: , Rfl:   •  Continuous Blood Gluc Transmit (Dexcom G6 Transmitter) misc, 1 Device See Admin Instructions., Disp: 1 each, Rfl: 0  •  diphenhydrAMINE (BENADRYL) 25 MG tablet, Take 1 tablet by mouth At Night As Needed for Itching., Disp: , Rfl:   •  DULoxetine (CYMBALTA) 60 MG capsule, Take 1 capsule by mouth Daily., Disp: , Rfl:   •  EPINEPHrine 0.1 MG/0.1ML solution auto-injector,  Inject  as directed., Disp: , Rfl:   •  eszopiclone (LUNESTA) 3 MG tablet, Take 1 tablet by mouth Daily., Disp: 30 tablet, Rfl: 2  •  ferrous sulfate 324 (65 Fe) MG tablet delayed-release EC tablet, Take 1 tablet by mouth 2 (Two) Times a Day With Meals. 2 tabs BID, Disp: , Rfl:   •  fluconazole (DIFLUCAN) 200 MG tablet, Take 1 tablet by mouth As Needed., Disp: , Rfl:   •  fluticasone-salmeterol (ADVAIR) 250-50 MCG/DOSE DISKUS, Inhale 2 puffs As Needed., Disp: , Rfl:   •  gabapentin (NEURONTIN) 300 MG capsule, Take 1 capsule by mouth 3 (Three) Times a Day., Disp: , Rfl:   •  glucagon (GLUCAGEN) 1 MG injection, Glucagon Emergency Kit 1 mg solution for injection, Disp: , Rfl:   •  HumaLOG KwikPen 100 UNIT/ML solution pen-injector, Sliding scale, Disp: , Rfl:   •  hydrOXYzine (ATARAX) 10 MG tablet, Every 4 (Four) Hours As Needed., Disp: , Rfl:   •  Insulin Glargine (BASAGLAR KWIKPEN) 100 UNIT/ML injection pen, 10 Units 2 (Two) Times a Day., Disp: , Rfl:   •  Magnesium 200 MG chewable tablet, Chew Daily., Disp: , Rfl:   •  meloxicam (MOBIC) 7.5 MG tablet, Take 1 tablet by mouth Daily., Disp: , Rfl:   •  metoprolol tartrate (LOPRESSOR) 50 MG tablet, Take 1 tablet by mouth 2 (Two) Times a Day., Disp: 180 tablet, Rfl: 3  •  nitroglycerin (NITROSTAT) 0.6 MG SL tablet, place 1 tablet (0.6 mg) by sublingual route at the first sign of an attack; may repeat every 5 minutes, until relief; if pain persists after a total of 3 tablets in 15 minutes, prompt medical attention is recommended, Disp: , Rfl:   •  ondansetron (ZOFRAN) 4 MG tablet, Take 1 tablet by mouth Every 8 (Eight) Hours As Needed for Nausea or Vomiting., Disp: 30 tablet, Rfl: 0  •  Potassium Chloride (KLOR-CON 10 PO), Take 1 tablet by mouth 2 (two) times a day., Disp: , Rfl:   •  prazosin (MINIPRESS) 5 MG capsule, Take 1 capsule by mouth Every Night., Disp: 30 capsule, Rfl: 0  •  SUMAtriptan (IMITREX) 100 MG tablet, 1 (One) Time As Needed., Disp: , Rfl:   •  traMADol  "(ULTRAM) 50 MG tablet, tramadol 50 mg tablet, Disp: , Rfl:   •  ubrogepant (UBRELVY) 100 MG tablet, Take 1 tablet by mouth 1 (One) Time As Needed (migraine) for up to 30 doses., Disp: 30 tablet, Rfl: 0  •  Unable to find, 1 each As Needed. Med Name: Hemp Gummies, Disp: , Rfl:   •  vitamin D (ERGOCALCIFEROL) 1.25 MG (41982 UT) capsule capsule, Take 1 capsule by mouth 1 (One) Time Per Week., Disp: , Rfl:   •  Continuous Blood Gluc  (FreeStyle Suma 2 [place]) device, 1 Device 2 (Two) Times a Day., Disp: 1 each, Rfl: 2  •  Continuous Blood Gluc Sensor (FreeStyle Suma Sensor System), Every 10 (Ten) Days., Disp: 3 each, Rfl: 5  •  Fremanezumab-vfrm (Ajovy) 225 MG/1.5ML solution auto-injector, Inject 225 mg under the skin into the appropriate area as directed Every 30 (Thirty) Days., Disp: 1.5 mL, Rfl: 11  •  levETIRAcetam (KEPPRA) 500 MG tablet, Take 1 tablet by mouth 2 (Two) Times a Day for 90 days., Disp: 60 tablet, Rfl: 2    Allergies:   Allergies   Allergen Reactions   • Meperidine Anaphylaxis and Unknown (See Comments)   • Morphine Shortness Of Breath and Unknown (See Comments)   • Mushroom Anaphylaxis   • Peanut Oil Anaphylaxis   • Penicillins Unknown (See Comments) and Anaphylaxis     Childhood reaction   • Clindamycin Hives   • Iodine Hives   • Tree Nut Unknown - High Severity   • Cortisone Rash and Unknown (See Comments)   • Other Other (See Comments)         STEADI Fall Risk Assessment has not been completed.    Objective     Objective:    /74   Pulse (!) 124   Ht 167.6 cm (66\")   Wt 82.6 kg (182 lb)   SpO2 98%   BMI 29.38 kg/m²   Body mass index is 29.38 kg/m².    Physical Exam  Vitals reviewed.   HENT:      Head: Normocephalic and atraumatic.      Mouth/Throat:      Mouth: Mucous membranes are moist.      Pharynx: Oropharynx is clear.   Pulmonary:      Effort: Pulmonary effort is normal. No respiratory distress.   Musculoskeletal:      Right lower leg: No edema.      Left lower leg: No " edema.   Skin:     General: Skin is warm and dry.      Findings: Lesion present.      Comments: Scattered crusted lesions noted on bilateral arms and legs          Neurology Exam:    General apperance: NAD.     Mental status: Alert, awake and oriented to time place and person.    Fund of knowledge:  Normal.     Language and Speech: No aphasia or dysarthria.    Naming , Repetition and Comprehension:  Can name objects, repeat a sentence and follow commands. Speech is clear and fluent with good repetition, comprehension, and naming.    Cranial Nerves:   CN II: Visual fields are full. Intact. Pupils - PERRLA  CN III, IV and VI: Extraocular movements are intact. Normal saccades.   CN V: Facial sensation is intact.   CN VII: Muscles of facial expression reveal no asymmetry. Intact.   CN VIII: Hearing is intact.    CN IX and X: Palate elevates symmetrically. Intact  CN XI: Shoulder shrug is intact.   CN XII: Tongue is midline without evidence of atrophy or fasciculation.     Motor:  Right UE muscle strength 4+/5. Normal tone. States that her shoulder has been weaker since sustaining a fall several months ago    Left UE muscle strength 5/5. Normal tone.      Right LE muscle strength 5/5. Normal tone.     Left LE muscle strength 5/5. Normal tone.      Sensory: Normal light touch sensation bilaterally.    DTRs: 2+ bilaterally in upper and lower extremities.    Coordination: Normal finger-to-nose, heel to shin B/L.    Gait: Normal.        Results:   Imaging:   CT Lumbar Spine Without Contrast    Result Date: 3/14/2023  Negative CT evaluation of the lumbar spine for acute bony injury. This study was performed using dose reduction techniques to achieve radiation exposure as low as reasonably achievable (ALARA).    XR shoulder complete 2 views min right    Result Date: 3/14/2023  Unremarkable exam.    CT spine thoracic without IV contrast    Result Date: 3/14/2023  Negative CT evaluation of the thoracic spine for acute bony  injury.       Labs:   WBC   Date Value Ref Range Status   05/03/2023 6.23 3.40 - 10.80 10*3/mm3 Final   10/30/2019 6.5 4.5 - 11.0 10*3/uL Final     RBC   Date Value Ref Range Status   05/03/2023 4.96 3.77 - 5.28 10*6/mm3 Final   10/30/2019 4.32 4.00 - 5.40 10*6/uL Final   08/23/2019 4.70 3.70 - 5.20 Final     Hemoglobin   Date Value Ref Range Status   05/03/2023 13.5 12.0 - 15.9 g/dL Final   10/30/2019 12.3 12.0 - 16.0 g/dL Final     Hematocrit   Date Value Ref Range Status   05/03/2023 40.2 34.0 - 46.6 % Final   10/30/2019 37.6 36.0 - 47.0 % Final     MCV   Date Value Ref Range Status   05/03/2023 81.0 79.0 - 97.0 fL Final   10/30/2019 87.0 80.0 - 99.0 fL Final     MCH   Date Value Ref Range Status   05/03/2023 27.2 26.6 - 33.0 pg Final   10/30/2019 28.5 27.0 - 33.0 pg Final     MCHC   Date Value Ref Range Status   05/03/2023 33.6 31.5 - 35.7 g/dL Final   10/30/2019 32.7 32.0 - 36.5 % Final   08/23/2019 32 32 - 36 g/dL Final     RDW   Date Value Ref Range Status   05/03/2023 14.7 12.3 - 15.4 % Final     RDW-SD   Date Value Ref Range Status   05/03/2023 42.6 37.0 - 54.0 fl Final   10/30/2019 44.8 37.0 - 51.0 fL Final     MPV   Date Value Ref Range Status   05/03/2023 10.4 6.0 - 12.0 fL Final   10/30/2019 9.6 8.5 - 12.0 fL Final     Platelets   Date Value Ref Range Status   05/03/2023 195 140 - 450 10*3/mm3 Final   10/30/2019 232 140 - 440 10*3/uL Final     Neutrophil Rel %   Date Value Ref Range Status   08/23/2019 65.7 % Final     Neutrophil %   Date Value Ref Range Status   05/03/2023 52.8 42.7 - 76.0 % Final     Lymphocyte Rel %   Date Value Ref Range Status   10/30/2019 30 % Final     Lymphocyte %   Date Value Ref Range Status   05/03/2023 40.1 19.6 - 45.3 % Final     Monocyte Rel %   Date Value Ref Range Status   10/30/2019 6 % Final     Monocyte %   Date Value Ref Range Status   05/03/2023 5.3 5.0 - 12.0 % Final     Eosinophil %   Date Value Ref Range Status   05/03/2023 1.0 0.3 - 6.2 % Final   10/30/2019 1 %  Final     Basophil Rel %   Date Value Ref Range Status   10/30/2019 1 % Final     Basophil %   Date Value Ref Range Status   05/03/2023 0.6 0.0 - 1.5 % Final     Immature Grans %   Date Value Ref Range Status   05/03/2023 0.2 0.0 - 0.5 % Final     Neutrophils Absolute   Date Value Ref Range Status   10/30/2019 4.10 2.07 - 8.80 10*3/uL Final   08/23/2019 4.6 1.5 - 7.0 Final     Neutrophils, Absolute   Date Value Ref Range Status   05/03/2023 3.29 1.70 - 7.00 10*3/mm3 Final     Lymphocytes Absolute   Date Value Ref Range Status   10/30/2019 1.95 0.90 - 4.95 10*3/uL Final     Lymphocytes, Absolute   Date Value Ref Range Status   05/03/2023 2.50 0.70 - 3.10 10*3/mm3 Final     Monocytes Absolute   Date Value Ref Range Status   10/30/2019 0.39 0.09 - 0.99 10*3/uL Final     Monocytes, Absolute   Date Value Ref Range Status   05/03/2023 0.33 0.10 - 0.90 10*3/mm3 Final     Eosinophils Absolute   Date Value Ref Range Status   10/30/2019 0.07 0.00 - 0.55 10*3/uL Final     Eosinophils, Absolute   Date Value Ref Range Status   05/03/2023 0.06 0.00 - 0.40 10*3/mm3 Final     Basophils Absolute   Date Value Ref Range Status   10/30/2019 0.07 0.00 - 0.22 10*3/uL Final     Basophils, Absolute   Date Value Ref Range Status   05/03/2023 0.04 0.00 - 0.20 10*3/mm3 Final     Immature Grans, Absolute   Date Value Ref Range Status   05/03/2023 0.01 0.00 - 0.05 10*3/mm3 Final     nRBC   Date Value Ref Range Status   05/03/2023 0.0 0.0 - 0.2 /100 WBC Final     Lab Results   Component Value Date    GLUCOSE 80 01/23/2023    BUN 13 01/23/2023    CREATININE 0.80 01/23/2023    EGFR 89.9 01/23/2023    BCR 16.3 01/23/2023    K 4.4 01/23/2023    CO2 24.4 01/23/2023    CALCIUM 8.8 01/23/2023    ALBUMIN 3.8 01/23/2023    BILITOT <0.2 01/23/2023    AST 15 01/23/2023    ALT 7 01/23/2023         Assessment / Plan      Assessment/Plan:   Diagnoses and all orders for this visit:    1. Generalized convulsive seizures (Primary)  -     levETIRAcetam (KEPPRA)  500 MG tablet; Take 1 tablet by mouth 2 (Two) Times a Day for 90 days.  Dispense: 60 tablet; Refill: 2  -     EEG; Future    2. PTSD (post-traumatic stress disorder)  -     Ambulatory Referral to Psychology    3. Anxiety  -     Ambulatory Referral to Psychology    4. Fibromyalgia  -     Ambulatory Referral to Pain Management    5. Intractable migraine with aura without status migrainosus  -     Fremanezumab-vfrm (Ajovy) 225 MG/1.5ML solution auto-injector; Inject 225 mg under the skin into the appropriate area as directed Every 30 (Thirty) Days.  Dispense: 1.5 mL; Refill: 11     Sigrid Bello is in neurology clinic to establish care with neurology for migraines, seizures, and dystonia.  She states that she has been taking gabapentin for seizure prevention for about 6 years and that recently she has started having breakthrough seizures, she attributes this to not being able to keep her afternoon dose of gabapentin down due to associated vomiting that she has in the afternoon.  She also does take lamotrigine but this is not for seizures, she takes it for PTSD.  I have discussed her case with Dr. Ochoa who advised starting patient on Keppra 500 mg twice daily.  Patient has been advised that Keppra can cause sleepiness, she is to call clinic with any bothersome side effects or concerns.  I have also ordered EEG as patient has not had an EEG in over 10 years.  Patient indicates that she did have an MRI recently at [facility], I am unable to see these results but patient is going to obtain records and bring them to our office for review, if she has not had an MRI brain then I will plan on ordering MRI brain for additional assessment.  I have advised patient that she should not be driving for 90 days from any seizure activity.    Regarding her migraines, patient had been taking rizatriptan and was switched to sumatriptan due to lack of effectiveness of rizatriptan, she does not feel that sumatriptan is overly  helpful for abortive migraine treatment, she states that Nurtec does not work well for her, she is also recently been prescribed Ubrelvy but has not received this yet.  She has also previously tried Botox for migraines, she states that Botox was not very helpful and that she did have eyelid ptosis after her last injection of Botox.  She states that she is taking Aimovig for migraine prevention and that it has stopped working, she states that her last injection of Aimovig was 1 month ago, I will start her on Ajovy instead.  Patient has been instructed on how to appropriately use CGRP injectable which she is familiar with from using Aimovig, patient instructed that this will be a once monthly injection.    I have also placed referrals to psychology and pain management, patient is taking an extensive amount of medications currently, I did not discontinue her gabapentin today as she indicated that she does take it for both seizures and fibromyalgia related pain, regarding her seizure prevention when she is on Keppra it would be appropriate for her to slowly discontinue gabapentin, I have consulted psychology and pain management for their expertise regarding patient's many psychogenic and controlled medications.          Patient Education:       Reviewed medications, potential side effects and signs and symptoms to report. Discussed risk versus benefits of treatment plan with patient and/or family-including medications, labs and radiology that may be ordered. Addressed questions and concerns during visit. Patient and/or family verbalized understanding and agree with plan. Instructed to call the office with any questions and report to ER with any life-threatening symptoms.     Follow Up:   Return in about 4 weeks (around 6/1/2023).    I spent 60 minutes face to face with the patient. I personally spent 50 percent of this time counseling and discussing diagnosis, diagnostic testing, evaluation, driving, treatment options  and management .       During this visit the following were done:  Labs Reviewed [x]    Labs Ordered []    Radiology Reports Reviewed []    Radiology Ordered [x]    PCP Records Reviewed [x]    Referring Provider Records Reviewed [x]    ER Records Reviewed [x]    Hospital Records Reviewed []    History Obtained From Family []    Radiology Images Reviewed []    Other Reviewed []    Records Requested [] patient is requesting records from [facility] and will bring them to clinic for review, if she has not had a recent MRI brain then I would like to obtain that as well for further assessment.    TAWNY Rodriguez  [facility] [place] [place] [facility] NEUROLOGY  [address] [address] [phone]

## 2023-05-04 NOTE — TELEPHONE ENCOUNTER
Patient was seen by Jacinda today for an appointment, I put in a stat request for records from [facility] for the [facility] and office note    As the appointment was yesterday in the chart it may not be signed and would explain why we can see the visit but not the notes at the moment

## 2023-05-04 NOTE — PROGRESS NOTES
"[facility] Internal Medicine    Sigrid Bello  1970   [id number]      Patient Care Team:  Jacinda Hines PA-C as PCP - General (Physician Assistant)  Ferny Collado MD as Consulting Physician (Cardiology)    Chief Complaint::   Chief Complaint   Patient presents with   • uterine masses     Found by GYN   • Med Refill     xanax        HPI  Sigrid is a 52-year-old female date of birth 1970 who presents today for a work-in  appointment to discuss upcoming surgery.  Unfortunately, she was seen by gynecology yesterday, and although note is uploaded into epic, I do not see finalized plan or transvaginal ultrasound results. There is no discussion of surgery.   Last time I saw Sigrid was in January to establish care.  At that time, multiple referrals were made to rheumatology endocrinology psychiatry and pain management.  She reports that her insurance has since changed.  She has quit her job.  Reports she is dizzy most days, unable to get things done in her home.  Requesting home health referral so that someone can come to her home to clean her house for her.  She is also requesting refills on alprazolam which she reports taking 0.5 mg tablets twice daily for the past year. Emphasized \"never missed a day.\"  She denies chest pain, shortness of breath, palpitations.       Patient Active Problem List   Diagnosis   • Mitral valve prolapse   • Fibromyalgia   • PTSD (post-traumatic stress disorder)   • Lupus   • Asthma   • Migraine without aura   • Dystonia   • Type 1 diabetes mellitus with other specified complication   • Primary insomnia   • Anxiety   • Uterine fibroid   • Pelvic pain   • Left kidney mass   • Non-rheumatic mitral regurgitation   • PVC's (premature ventricular contractions)   • Blastomycosis   • High risk medication use   • Dizziness        Past Medical History:   Diagnosis Date   • Anemia    • Anxiety and depression    • Arthritis    • Asthma    • Blastomycosis    • Diabetes mellitus    • " "Fibromyalgia    • Hypertension    • Hypokalemia    • Lupus    • Migraine    • MVP (mitral valve prolapse)    • PTSD (post-traumatic stress disorder)    • Tachycardia    • Uterine mass        Past Surgical History:   Procedure Laterality Date   • ANKLE SURGERY      both ankles rebuilt   • INNER EAR SURGERY     • KNEE SURGERY     • TEETH EXTRACTION     • UVULECTOMY         No family history on file.    Social History     Socioeconomic History   • Marital status: Single   Tobacco Use   • Smoking status: Never   • Smokeless tobacco: Never   Vaping Use   • Vaping Use: Never used   Substance and Sexual Activity   • Alcohol use: Yes     Alcohol/week: 1.0 standard drink     Types: 1 Glasses of wine per week     Comment: occas   • Drug use: No   • Sexual activity: Defer       Allergies   Allergen Reactions   • Meperidine Anaphylaxis and Unknown (See Comments)   • Morphine Shortness Of Breath and Unknown (See Comments)   • Mushroom Anaphylaxis   • Peanut Oil Anaphylaxis   • Penicillins Unknown (See Comments) and Anaphylaxis     Childhood reaction   • Clindamycin Hives   • Iodine Hives   • Tree Nut Unknown - High Severity   • Cortisone Rash and Unknown (See Comments)   • Other Other (See Comments)       Review of Systems   Constitutional: Positive for activity change and fatigue. Negative for fever, unexpected weight gain and unexpected weight loss.   Gastrointestinal: Positive for abdominal pain.   Endocrine: Negative for cold intolerance and heat intolerance.   Genitourinary: Positive for menstrual problem.   Musculoskeletal: Positive for myalgias.   Skin: Positive for color change and skin lesions.   Neurological: Positive for dizziness.        Vital Signs  Vitals:    05/04/23 1219   BP: 110/74   BP Location: Left arm   Patient Position: Sitting   Cuff Size: Adult   Pulse: 72   Weight: 86.6 kg (191 lb)   Height: 167.6 cm (66\")   PainSc: 0-No pain     Body mass index is 30.83 kg/m².  BMI is >= 30 and <35. (Class 1 Obesity). " The following options were offered after discussion;: weight loss educational material (shared in after visit summary), exercise counseling/recommendations and nutrition counseling/recommendations     Advance Care Planning   ACP discussion was held with the patient during this visit. Patient does not have an advance directive, information provided.       Current Outpatient Medications:   •  albuterol sulfate  (90 Base) MCG/ACT inhaler, Inhale 2 puffs Every 4 (Four) Hours As Needed for Wheezing., Disp: , Rfl:   •  ALPRAZolam (XANAX) 0.5 MG tablet, Take 1 tablet by mouth At Night As Needed., Disp: , Rfl:   •  butalbital-aspirin-caffeine (FIORINAL) -40 MG per tablet, Take 1 tablet by mouth Every Morning., Disp: , Rfl:   •  Continuous Blood Gluc  (FreeStyle Suma 2 [place]) device, 1 Device 2 (Two) Times a Day., Disp: 1 each, Rfl: 2  •  Continuous Blood Gluc Sensor (FreeStyle Suma Sensor System), Every 10 (Ten) Days., Disp: 3 each, Rfl: 5  •  Continuous Blood Gluc Transmit (Dexcom G6 Transmitter) misc, 1 Device See Admin Instructions., Disp: 1 each, Rfl: 0  •  diphenhydrAMINE (BENADRYL) 25 MG tablet, Take 1 tablet by mouth At Night As Needed for Itching., Disp: , Rfl:   •  DULoxetine (CYMBALTA) 60 MG capsule, Take 1 capsule by mouth Daily., Disp: , Rfl:   •  EPINEPHrine 0.1 MG/0.1ML solution auto-injector, Inject  as directed., Disp: , Rfl:   •  eszopiclone (LUNESTA) 3 MG tablet, Take 1 tablet by mouth Daily., Disp: 30 tablet, Rfl: 2  •  ferrous sulfate 324 (65 Fe) MG tablet delayed-release EC tablet, Take 1 tablet by mouth 2 (Two) Times a Day With Meals. 2 tabs BID, Disp: , Rfl:   •  fluconazole (DIFLUCAN) 200 MG tablet, Take 1 tablet by mouth As Needed., Disp: , Rfl:   •  fluticasone-salmeterol (ADVAIR) 250-50 MCG/DOSE DISKUS, Inhale 2 puffs As Needed., Disp: , Rfl:   •  Fremanezumab-vfrm (Ajovy) 225 MG/1.5ML solution auto-injector, Inject 225 mg under the skin into the appropriate area as  directed Every 30 (Thirty) Days., Disp: 1.5 mL, Rfl: 11  •  gabapentin (NEURONTIN) 300 MG capsule, Take 1 capsule by mouth 3 (Three) Times a Day., Disp: , Rfl:   •  glucagon (GLUCAGEN) 1 MG injection, Glucagon Emergency Kit 1 mg solution for injection, Disp: , Rfl:   •  HumaLOG KwikPen 100 UNIT/ML solution pen-injector, Sliding scale, Disp: , Rfl:   •  hydrOXYzine (ATARAX) 10 MG tablet, Every 4 (Four) Hours As Needed., Disp: , Rfl:   •  Insulin Glargine (BASAGLAR KWIKPEN) 100 UNIT/ML injection pen, 10 Units 2 (Two) Times a Day., Disp: , Rfl:   •  levETIRAcetam (KEPPRA) 500 MG tablet, Take 1 tablet by mouth 2 (Two) Times a Day for 90 days., Disp: 60 tablet, Rfl: 2  •  Magnesium 200 MG chewable tablet, Chew Daily., Disp: , Rfl:   •  meloxicam (MOBIC) 7.5 MG tablet, Take 1 tablet by mouth Daily., Disp: , Rfl:   •  metoprolol tartrate (LOPRESSOR) 50 MG tablet, Take 1 tablet by mouth 2 (Two) Times a Day., Disp: 180 tablet, Rfl: 3  •  nitroglycerin (NITROSTAT) 0.6 MG SL tablet, place 1 tablet (0.6 mg) by sublingual route at the first sign of an attack; may repeat every 5 minutes, until relief; if pain persists after a total of 3 tablets in 15 minutes, prompt medical attention is recommended, Disp: , Rfl:   •  ondansetron (ZOFRAN) 4 MG tablet, Take 1 tablet by mouth Every 8 (Eight) Hours As Needed for Nausea or Vomiting., Disp: 30 tablet, Rfl: 0  •  Potassium Chloride (KLOR-CON 10 PO), Take 1 tablet by mouth 2 (two) times a day., Disp: , Rfl:   •  prazosin (MINIPRESS) 5 MG capsule, Take 1 capsule by mouth Every Night., Disp: 30 capsule, Rfl: 0  •  SUMAtriptan (IMITREX) 100 MG tablet, 1 (One) Time As Needed., Disp: , Rfl:   •  traMADol (ULTRAM) 50 MG tablet, tramadol 50 mg tablet, Disp: , Rfl:   •  ubrogepant (UBRELVY) 100 MG tablet, Take 1 tablet by mouth 1 (One) Time As Needed (migraine) for up to 30 doses., Disp: 30 tablet, Rfl: 0  •  Unable to find, 1 each As Needed. Med Name: Key Juarez, Disp: , Rfl:   •  vitamin D  (ERGOCALCIFEROL) 1.25 MG (87652 UT) capsule capsule, Take 1 capsule by mouth 1 (One) Time Per Week., Disp: , Rfl:   •  aspirin 81 MG EC tablet, Take 1 tablet by mouth Daily., Disp: , Rfl:     Physical Exam  Vitals and nursing note reviewed.   HENT:      Head: Normocephalic and atraumatic.      Nose: Nose normal.      Mouth/Throat:      Mouth: Mucous membranes are moist.      Pharynx: Oropharynx is clear.   Eyes:      Conjunctiva/sclera: Conjunctivae normal.      Pupils: Pupils are equal, round, and reactive to light.   Cardiovascular:      Rate and Rhythm: Normal rate and regular rhythm.      Pulses: Normal pulses.      Heart sounds: Normal heart sounds.   Pulmonary:      Effort: Pulmonary effort is normal.      Breath sounds: Normal breath sounds. No wheezing.   Abdominal:      General: Abdomen is flat.      Tenderness: There is abdominal tenderness.   Musculoskeletal:      Cervical back: Normal range of motion.   Skin:     Findings: Erythema and lesion present.             Comments: Scattered erythematous crusting lesions over forearms, abdomen   Neurological:      General: No focal deficit present.      Mental Status: She is alert and oriented to person, place, and time.   Psychiatric:         Mood and Affect: Mood normal.         Behavior: Behavior normal.          ACE III MINI            Results Review:    Recent Results (from the past 672 hour(s))   T4, Free    Collection Time: 05/03/23 11:21 AM    Specimen: Blood   Result Value Ref Range    Free T4 1.27 0.93 - 1.70 ng/dL   TSH    Collection Time: 05/03/23 11:21 AM    Specimen: Blood   Result Value Ref Range    TSH 2.480 0.270 - 4.200 uIU/mL   Hemoglobin A1c    Collection Time: 05/03/23 11:21 AM    Specimen: Blood   Result Value Ref Range    Hemoglobin A1C 5.10 4.80 - 5.60 %   CBC Auto Differential    Collection Time: 05/03/23 11:21 AM    Specimen: Blood   Result Value Ref Range    WBC 6.23 3.40 - 10.80 10*3/mm3    RBC 4.96 3.77 - 5.28 10*6/mm3    Hemoglobin  13.5 12.0 - 15.9 g/dL    Hematocrit 40.2 34.0 - 46.6 %    MCV 81.0 79.0 - 97.0 fL    MCH 27.2 26.6 - 33.0 pg    MCHC 33.6 31.5 - 35.7 g/dL    RDW 14.7 12.3 - 15.4 %    RDW-SD 42.6 37.0 - 54.0 fl    MPV 10.4 6.0 - 12.0 fL    Platelets 195 140 - 450 10*3/mm3    Neutrophil % 52.8 42.7 - 76.0 %    Lymphocyte % 40.1 19.6 - 45.3 %    Monocyte % 5.3 5.0 - 12.0 %    Eosinophil % 1.0 0.3 - 6.2 %    Basophil % 0.6 0.0 - 1.5 %    Immature Grans % 0.2 0.0 - 0.5 %    Neutrophils, Absolute 3.29 1.70 - 7.00 10*3/mm3    Lymphocytes, Absolute 2.50 0.70 - 3.10 10*3/mm3    Monocytes, Absolute 0.33 0.10 - 0.90 10*3/mm3    Eosinophils, Absolute 0.06 0.00 - 0.40 10*3/mm3    Basophils, Absolute 0.04 0.00 - 0.20 10*3/mm3    Immature Grans, Absolute 0.01 0.00 - 0.05 10*3/mm3    nRBC 0.0 0.0 - 0.2 /100 WBC     Procedures    Medication Review: Medications reviewed and noted    Social History     Socioeconomic History   • Marital status: Single   Tobacco Use   • Smoking status: Never   • Smokeless tobacco: Never   Vaping Use   • Vaping Use: Never used   Substance and Sexual Activity   • Alcohol use: Yes     Alcohol/week: 1.0 standard drink     Types: 1 Glasses of wine per week     Comment: occas   • Drug use: No   • Sexual activity: Defer        Assessment/Plan:    Diagnoses and all orders for this visit:    1. Anxiety (Primary)  Overview:  Duloxetine 60 mg capsules daily listed on medication list, unsure if this was originally prescribed for anxiety or for fibromyalgia.  She also lists alprazolam 0.5 mg tablets nightly on her med list.  However, patient reports to me that she was taking this twice daily for the past year.  Her previous prescriber is not covered under her insurance anymore.  I have discussed with Sigrid that her urine drug screen from January and her urine drug screen from April both did not show any alprazolam in her system.  Therefore, I will be unable to write this prescription for her.  I did place a referral to  "psychiatry for her.  Additionally, [facility] report obtained in January showed 1 prescription for alprazolam written March 2022.    Orders:  -     Ambulatory Referral to Psychiatry    2. Blastomycosis  Overview:  ?? Data deficient      3. Uterine leiomyoma, unspecified location  Overview:  Awaiting transvaginal ultrasound report.      4. Dermatitis  -     Ambulatory Referral to Dermatology    5. Fibromyalgia  Overview:  Continue duloxetine 60mg daily.      6. High risk medication use  Overview:  Patient reports being on alprazolam 0.5mg twice daily for the past year, never missing a day.  UDS from January and April, both negative. Patient reports arranging her medications weekly.  When questioned about the negative drug screen, she reports \"maybe her boyfriend did not place them in the planner.\" I have emphasized that I will not prescribe controlled medications for her.        7. Dizziness  Overview:  I have reviewed labs with patient.  Unsure of the cause.  Reports occurs throughout the day. She has established care with neurology.         Plan of care reviewed with patient at the conclusion of today's visit. Education was provided regarding diagnosis, management, and any prescribed or recommended OTC medications.Patient verbalizes understanding of and agreement with management plan.         I spent 54 minutes caring for Sigrid on this date of service. This time includes time spent by me in the following activities:preparing for the visit, reviewing tests, obtaining and/or reviewing a separately obtained history, performing a medically appropriate examination and/or evaluation , counseling and educating the patient/family/caregiver, ordering medications, tests, or procedures, referring and communicating with other health care professionals  and documenting information in the medical record    Jacinda Hines PA-C      Note: Part of this note may be an electronic transcription/translation of spoken language to " printed text using the Dragon Dictation system.

## 2023-05-09 ENCOUNTER — TELEPHONE (OUTPATIENT)
Dept: INTERNAL MEDICINE | Facility: CLINIC | Age: 53
End: 2023-05-09
Payer: COMMERCIAL

## 2023-05-09 NOTE — TELEPHONE ENCOUNTER
As stated before,  Patient works from home.  I do no write for medical leave for this type of issue. Additionally, her migraines were not even discussed at her appointment.

## 2023-05-09 NOTE — TELEPHONE ENCOUNTER
Caller: Sigrid Bello    Relationship: Self    Best call back number: [phone]    What is the best time to reach you: ANYTIME     Who are you requesting to speak with (clinical staff, provider,  specific staff member): CLINICAL STAFF     What was the call regarding: PATIENT STATES THAT SHE HAS APPLIED FOR AN INTERMITTENT LEAVE OF WORK FOR HER MIGRAINES. SHE SAYS THAT A FAX SHOULD BE SENT TO THE OFFICE SOON ABOUT THIS INFORMATION. SHE WOULD LIKE TO HAVE THE PAPERWORK FILLED OUT AND SENT BACK.      Do you require a callback: YES

## 2023-05-10 ENCOUNTER — PRIOR AUTHORIZATION (OUTPATIENT)
Dept: NEUROLOGY | Facility: CLINIC | Age: 53
End: 2023-05-10
Payer: COMMERCIAL

## 2023-05-10 ENCOUNTER — TELEPHONE (OUTPATIENT)
Dept: INTERNAL MEDICINE | Facility: CLINIC | Age: 53
End: 2023-05-10
Payer: COMMERCIAL

## 2023-05-10 NOTE — TELEPHONE ENCOUNTER
PT CALLED AND STATES SHE NEEDS A REFERRAL TO PAIN MANAGEMENT.    DR. ADELINA BURTON   [address] [place] [address]  [phone]

## 2023-05-10 NOTE — TELEPHONE ENCOUNTER
[id number]  Ajovy 225mg/1.5ml    Received fax with Approval 05/10/2023.    The authorization is effective for a maximum of 3 fill(s) from 05/10/2023 to 08/09/2023, as long as she is enrolled as a member of her current health plan.

## 2023-05-10 NOTE — TELEPHONE ENCOUNTER
LOOKS LIKE NEUROLOGY IS WORKING ON THIS REFERRAL AS OF TODAY.  I WILL CALL THE PT BACK AND LET HER KNOW.

## 2023-05-12 ENCOUNTER — TELEPHONE (OUTPATIENT)
Dept: NEUROLOGY | Facility: CLINIC | Age: 53
End: 2023-05-12

## 2023-05-12 ENCOUNTER — OFFICE VISIT (OUTPATIENT)
Dept: INTERNAL MEDICINE | Facility: CLINIC | Age: 53
End: 2023-05-12
Payer: COMMERCIAL

## 2023-05-12 ENCOUNTER — TELEPHONE (OUTPATIENT)
Dept: INTERNAL MEDICINE | Facility: CLINIC | Age: 53
End: 2023-05-12

## 2023-05-12 VITALS
HEIGHT: 66 IN | DIASTOLIC BLOOD PRESSURE: 88 MMHG | BODY MASS INDEX: 30.37 KG/M2 | TEMPERATURE: 97.1 F | WEIGHT: 189 LBS | HEART RATE: 60 BPM | OXYGEN SATURATION: 97 % | SYSTOLIC BLOOD PRESSURE: 142 MMHG

## 2023-05-12 DIAGNOSIS — F51.01 PRIMARY INSOMNIA: Primary | ICD-10-CM

## 2023-05-12 DIAGNOSIS — M79.7 FIBROMYALGIA: ICD-10-CM

## 2023-05-12 NOTE — PROGRESS NOTES
Office Note      Date: 2023  Patient Name: Sigrid Bello  MRN: [id number]  : 1970    Chief Complaint   Patient presents with   • Establish Care     Fibromyalgia, RA, dystonia, mass on uterus, and mass on kidney       History of Present Illness: Sigrid Bello is a 52 y.o. female who presents for Establish Care (Fibromyalgia, RA, dystonia, mass on uterus, and mass on kidney). Prior notes from recent  visits reviewed. On lunesta prescribed by psych. Requesting refill of this. Changed insurance, needs referral to rheum. Has not been on gabapentin for a while that was prescribed by pain med. On cymbalta for fibro.     Subjective      Review of Systems:   Pertinent review of systems per HPI.    Review of Systems   Constitutional: Negative for activity change, appetite change, chills, diaphoresis, fatigue, fever and unexpected weight change.   HENT: Negative for congestion, dental problem, drooling, ear discharge, ear pain, facial swelling, hearing loss and mouth sores.    Eyes: Negative for pain, discharge and itching.   Respiratory: Negative for apnea, cough, choking, chest tightness and shortness of breath.    Cardiovascular: Negative for chest pain, palpitations and leg swelling.   Gastrointestinal: Negative for abdominal distention, abdominal pain, blood in stool, constipation and diarrhea.   Endocrine: Negative for cold intolerance, heat intolerance, polydipsia and polyuria.   Genitourinary: Negative for difficulty urinating, dysuria, frequency and hematuria.   Skin: Negative for color change, pallor, rash and wound.   Allergic/Immunologic: Negative for environmental allergies, food allergies and immunocompromised state.   Neurological: Positive for headaches. Negative for dizziness, weakness and light-headedness.   Psychiatric/Behavioral: Negative for agitation, behavioral problems, confusion, decreased concentration and self-injury. The patient is not nervous/anxious.    All other  "systems reviewed and are negative.    Allergies   Allergen Reactions   • Meperidine Anaphylaxis and Unknown (See Comments)   • Morphine Shortness Of Breath and Unknown (See Comments)   • Mushroom Anaphylaxis   • Peanut Oil Anaphylaxis   • Penicillins Unknown (See Comments) and Anaphylaxis     Childhood reaction   • Clindamycin Hives   • Iodine Hives   • Tree Nut Unknown - High Severity   • Cortisone Rash and Unknown (See Comments)   • Other Other (See Comments)       Objective     Physical Exam:  Vital Signs:   Vitals:    05/12/23 0951   BP: 142/88   Pulse: 60   Temp: 97.1 °F (36.2 °C)   SpO2: 97%   Weight: 85.7 kg (189 lb)   Height: 167.6 cm (66\")   PainSc:   8   PainLoc: Generalized      Body mass index is 30.51 kg/m².    Physical Exam  Vitals and nursing note reviewed.   Constitutional:       General: She is not in acute distress.     Appearance: She is well-developed.   HENT:      Head: Normocephalic and atraumatic.      Right Ear: External ear normal.      Left Ear: External ear normal.   Eyes:      General: No scleral icterus.        Right eye: No discharge.         Left eye: No discharge.      Conjunctiva/sclera: Conjunctivae normal.   Cardiovascular:      Rate and Rhythm: Normal rate and regular rhythm.      Heart sounds: Normal heart sounds. No murmur heard.    No friction rub. No gallop.   Pulmonary:      Effort: Pulmonary effort is normal. No respiratory distress.      Breath sounds: Normal breath sounds. No wheezing or rales.   Skin:     General: Skin is warm and dry.      Coloration: Skin is not pale.         Assessment / Plan      Assessment & Plan:    1. Primary insomnia  Pt did not realize she had another refill per emerita. Will get psych records, she will also need to see psych for this, ref has already been placed    2. Fibromyalgia  Cont cymalta. Discussed tx of fibro w/ exercise and PT. New ref placed.   - Ambulatory Referral to Rheumatology    Advised that she will need to see specialist for " work accomodations as this is my first time meeting her. Discussed all her referrals that have been placed by prior providers that are still up to date. F/u prn    Belen Mercer MD  05/12/2023

## 2023-05-12 NOTE — TELEPHONE ENCOUNTER
Caller: Sigrid Bello JOSE ARMANDO    Relationship: Self    Best call back number: [phone]    What medications are you currently taking:   Current Outpatient Medications on File Prior to Visit   Medication Sig Dispense Refill   • albuterol sulfate  (90 Base) MCG/ACT inhaler Inhale 2 puffs Every 4 (Four) Hours As Needed for Wheezing.     • aspirin 81 MG EC tablet Take 1 tablet by mouth Daily.     • butalbital-aspirin-caffeine (FIORINAL) -40 MG per tablet Take 1 tablet by mouth Every Morning.     • Continuous Blood Gluc  (FreeStyle Suma 2 [place]) device 1 Device 2 (Two) Times a Day. 1 each 2   • Continuous Blood Gluc Sensor (FreeStyle Suma Sensor System) Every 10 (Ten) Days. 3 each 5   • Continuous Blood Gluc Transmit (Dexcom G6 Transmitter) misc 1 Device See Admin Instructions. 1 each 0   • diphenhydrAMINE (BENADRYL) 25 MG tablet Take 1 tablet by mouth At Night As Needed for Itching.     • DULoxetine (CYMBALTA) 60 MG capsule Take 1 capsule by mouth Daily.     • EPINEPHrine 0.1 MG/0.1ML solution auto-injector Inject  as directed.     • eszopiclone (LUNESTA) 3 MG tablet Take 1 tablet by mouth Daily. 30 tablet 2   • ferrous sulfate 324 (65 Fe) MG tablet delayed-release EC tablet Take 1 tablet by mouth 2 (Two) Times a Day With Meals. 2 tabs BID     • fluticasone-salmeterol (ADVAIR) 250-50 MCG/DOSE DISKUS Inhale 2 puffs As Needed.     • Fremanezumab-vfrm (Ajovy) 225 MG/1.5ML solution auto-injector Inject 225 mg under the skin into the appropriate area as directed Every 30 (Thirty) Days. 1.5 mL 11   • gabapentin (NEURONTIN) 300 MG capsule Take 1 capsule by mouth 3 (Three) Times a Day.     • glucagon (GLUCAGEN) 1 MG injection Glucagon Emergency Kit 1 mg solution for injection     • HumaLOG KwikPen 100 UNIT/ML solution pen-injector Sliding scale     • hydrOXYzine (ATARAX) 10 MG tablet Every 4 (Four) Hours As Needed.     • Insulin Glargine (BASAGLAR KWIKPEN) 100 UNIT/ML injection pen 10 Units 2 (Two)  Times a Day.     • levETIRAcetam (KEPPRA) 500 MG tablet Take 1 tablet by mouth 2 (Two) Times a Day for 90 days. 60 tablet 2   • Magnesium 200 MG chewable tablet Chew Daily.     • meloxicam (MOBIC) 7.5 MG tablet Take 1 tablet by mouth Daily.     • metoprolol tartrate (LOPRESSOR) 50 MG tablet Take 1 tablet by mouth 2 (Two) Times a Day. 180 tablet 3   • nitroglycerin (NITROSTAT) 0.6 MG SL tablet place 1 tablet (0.6 mg) by sublingual route at the first sign of an attack; may repeat every 5 minutes, until relief; if pain persists after a total of 3 tablets in 15 minutes, prompt medical attention is recommended     • ondansetron (ZOFRAN) 4 MG tablet Take 1 tablet by mouth Every 8 (Eight) Hours As Needed for Nausea or Vomiting. 30 tablet 0   • Potassium Chloride (KLOR-CON 10 PO) Take 1 tablet by mouth 2 (two) times a day.     • prazosin (MINIPRESS) 5 MG capsule Take 1 capsule by mouth Every Night. (Patient taking differently: Take 2 capsules by mouth Every Night.) 30 capsule 0   • SUMAtriptan (IMITREX) 100 MG tablet 1 (One) Time As Needed.     • traMADol (ULTRAM) 50 MG tablet tramadol 50 mg tablet     • ubrogepant (UBRELVY) 100 MG tablet Take 1 tablet by mouth 1 (One) Time As Needed (migraine) for up to 30 doses. 30 tablet 0   • Unable to find 1 each As Needed. Med Name: Hemp Gummies     • vitamin D (ERGOCALCIFEROL) 1.25 MG (26958 UT) capsule capsule Take 1 capsule by mouth 1 (One) Time Per Week.     • [DISCONTINUED] fluconazole (DIFLUCAN) 200 MG tablet Take 1 tablet by mouth As Needed.       No current facility-administered medications on file prior to visit.        Which medication are you concerned about:   • eszopiclone (LUNESTA) 3 MG tablet       What are your concerns:     PATIENT STATED SHE WAS IN OFFICE THIS MORNING AND DR KULKARNI WOULD NOT SEND IN [facility] FOR LUNESTA AND STATED IT SHOWS ONE TO  LOCALLY. SHE STATED SHE HAS CHANGED INSURANCE AND NO LONGER HAS MAIL ORDER AND HAS EVEN CALLED HER INSURANCE AND THEY  179.8 DO NOT SHOW A PRESCRIPTION FILLED THROUGH THEM FOR LUNESTA. REQUEST PRESCRIPTION SENT TO [facility] ON [address] SHOWING LAST TIME THE PRESCRIPTION WAS SENT WAS ON 4/4/23 TO [facility] WHICH PATIENT CANT USE ANY LONGER WITH NEW INSURANCE.    PATIENT ALSO NEEDS A WORK EXCUSE FOR TODAY AND WANTS TO COME BY ON Monday TO . CALL PATIENT TO ADVISE

## 2023-05-12 NOTE — TELEPHONE ENCOUNTER
PT CALLING REGARDING FMLA PAPERWORK - SAYS IT SHOULD HAVE BEEN SENT TO THE OFFICE FOR KHA[employer] ANTHOYN TO COMPLETE - NO UPDATE IN PT CHART AT THIS TIME.    PLEASE UPDATE PATIENT REGARDING FMLA PAPERWORK    THANK YOU

## 2023-05-15 ENCOUNTER — PATIENT MESSAGE (OUTPATIENT)
Dept: NEUROLOGY | Facility: CLINIC | Age: 53
End: 2023-05-15
Payer: COMMERCIAL

## 2023-05-15 NOTE — TELEPHONE ENCOUNTER
Spoke with patient about her lunesta being refilled and patient states she is completely out and her pain management appointment was canceled today by the provider. I told patient per Dr. Mercer's office note when she established care with her states she will refer patient out to psych for her lunesta. From looking in patients chart she was referred to psych by her PCP CARLOS Locke and was scheduled to see Psych today 5/15/2023 at 8:00 am; patient states she did not make it to that appointment. No referral for Psych has been placed by Dr. Mercer. Please advise.      Brynn,    Patient has a Psych referral from her previous PCP and was scheduled to see them today 5/15/2023 at 8 am but patient did not show. Does patient need a new referral from Dr. Mercer for psych since she's under her care?

## 2023-05-16 ENCOUNTER — TELEMEDICINE (OUTPATIENT)
Dept: FAMILY MEDICINE CLINIC | Facility: TELEHEALTH | Age: 53
End: 2023-05-16
Payer: COMMERCIAL

## 2023-05-16 ENCOUNTER — PATIENT ROUNDING (BHMG ONLY) (OUTPATIENT)
Dept: INTERNAL MEDICINE | Facility: CLINIC | Age: 53
End: 2023-05-16
Payer: COMMERCIAL

## 2023-05-16 DIAGNOSIS — J02.9 ACUTE PHARYNGITIS, UNSPECIFIED ETIOLOGY: Primary | ICD-10-CM

## 2023-05-16 RX ORDER — PREDNISONE 20 MG/1
20 TABLET ORAL 2 TIMES DAILY
Qty: 14 TABLET | Refills: 0 | Status: SHIPPED | OUTPATIENT
Start: 2023-05-16 | End: 2023-05-23

## 2023-05-16 NOTE — PROGRESS NOTES
A  my chart message has been sent to the patient for patient [place] [facility]

## 2023-05-16 NOTE — PROGRESS NOTES
Chief Complaint   Patient presents with    Fever    Sore Throat       Video Visit Reason:   Free Text Description: Fever sore throat  Subjective   Sigrid Bello is a 52 y.o. female.     History of Present Illness  Sore throat, painful swallowing, fibromyalgia pain is much worse since yesterday. Temp up to 100  Fever   This is a new problem. The current episode started yesterday. The maximum temperature noted was 100 to 100.9 F. Associated symptoms include headaches and a sore throat. Pertinent negatives include no chest pain, congestion, coughing, ear pain, nausea or wheezing.   Sore Throat   This is a new problem. The current episode started yesterday. The maximum temperature recorded prior to her arrival was 100.4 - 100.9 F. Associated symptoms include headaches. Pertinent negatives include no congestion, coughing, ear pain, hoarse voice, plugged ear sensation, neck pain, shortness of breath or trouble swallowing. She has tried acetaminophen for the symptoms. The treatment provided no relief.     The following portions of the patient's history were reviewed and updated as appropriate: allergies, current medications, past medical history, and problem list.      Past Medical History:   Diagnosis Date    Anemia     Anxiety and depression     Arthritis     Asthma     Blastomycosis     Diabetes mellitus     Fibromyalgia     Hypertension     Hypokalemia     Lupus     Migraine     MVP (mitral valve prolapse)     PTSD (post-traumatic stress disorder)     Tachycardia     Uterine mass      Social History     Socioeconomic History    Marital status: Single   Tobacco Use    Smoking status: Never    Smokeless tobacco: Never   Vaping Use    Vaping Use: Never used   Substance and Sexual Activity    Alcohol use: Yes     Alcohol/week: 1.0 standard drink     Types: 1 Glasses of wine per week     Comment: occas    Drug use: No    Sexual activity: Defer     medication documentation: reviewed and updated with patient and    Current Outpatient Medications:     albuterol sulfate  (90 Base) MCG/ACT inhaler, Inhale 2 puffs Every 4 (Four) Hours As Needed for Wheezing., Disp: , Rfl:     aspirin 81 MG EC tablet, Take 1 tablet by mouth Daily., Disp: , Rfl:     butalbital-aspirin-caffeine (FIORINAL) -40 MG per tablet, Take 1 tablet by mouth Every Morning., Disp: , Rfl:     Continuous Blood Gluc  (FreeStyle Suma 2 Cincinnatus) device, 1 Device 2 (Two) Times a Day., Disp: 1 each, Rfl: 2    Continuous Blood Gluc Sensor (FreeStyle Suma Sensor System), Every 10 (Ten) Days., Disp: 3 each, Rfl: 5    Continuous Blood Gluc Transmit (Dexcom G6 Transmitter) misc, 1 Device See Admin Instructions., Disp: 1 each, Rfl: 0    diphenhydrAMINE (BENADRYL) 25 MG tablet, Take 1 tablet by mouth At Night As Needed for Itching., Disp: , Rfl:     DULoxetine (CYMBALTA) 60 MG capsule, Take 1 capsule by mouth Daily., Disp: , Rfl:     EPINEPHrine 0.1 MG/0.1ML solution auto-injector, Inject  as directed., Disp: , Rfl:     eszopiclone (LUNESTA) 3 MG tablet, Take 1 tablet by mouth Daily., Disp: 30 tablet, Rfl: 2    ferrous sulfate 324 (65 Fe) MG tablet delayed-release EC tablet, Take 1 tablet by mouth 2 (Two) Times a Day With Meals. 2 tabs BID, Disp: , Rfl:     fluticasone-salmeterol (ADVAIR) 250-50 MCG/DOSE DISKUS, Inhale 2 puffs As Needed., Disp: , Rfl:     Fremanezumab-vfrm (Ajovy) 225 MG/1.5ML solution auto-injector, Inject 225 mg under the skin into the appropriate area as directed Every 30 (Thirty) Days., Disp: 1.5 mL, Rfl: 11    gabapentin (NEURONTIN) 300 MG capsule, Take 1 capsule by mouth 3 (Three) Times a Day., Disp: , Rfl:     glucagon (GLUCAGEN) 1 MG injection, Glucagon Emergency Kit 1 mg solution for injection, Disp: , Rfl:     HumaLOG KwikPen 100 UNIT/ML solution pen-injector, Sliding scale, Disp: , Rfl:     hydrOXYzine (ATARAX) 10 MG tablet, Every 4 (Four) Hours As Needed., Disp: , Rfl:     Insulin Glargine (BASAGLAR KWIKPEN) 100 UNIT/ML  injection pen, 10 Units 2 (Two) Times a Day., Disp: , Rfl:     levETIRAcetam (KEPPRA) 500 MG tablet, Take 1 tablet by mouth 2 (Two) Times a Day for 90 days., Disp: 60 tablet, Rfl: 2    Magnesium 200 MG chewable tablet, Chew Daily., Disp: , Rfl:     meloxicam (MOBIC) 7.5 MG tablet, Take 1 tablet by mouth Daily., Disp: , Rfl:     metoprolol tartrate (LOPRESSOR) 50 MG tablet, Take 1 tablet by mouth 2 (Two) Times a Day., Disp: 180 tablet, Rfl: 3    nitroglycerin (NITROSTAT) 0.6 MG SL tablet, place 1 tablet (0.6 mg) by sublingual route at the first sign of an attack; may repeat every 5 minutes, until relief; if pain persists after a total of 3 tablets in 15 minutes, prompt medical attention is recommended, Disp: , Rfl:     ondansetron (ZOFRAN) 4 MG tablet, Take 1 tablet by mouth Every 8 (Eight) Hours As Needed for Nausea or Vomiting., Disp: 30 tablet, Rfl: 0    Potassium Chloride (KLOR-CON 10 PO), Take 1 tablet by mouth 2 (two) times a day., Disp: , Rfl:     prazosin (MINIPRESS) 5 MG capsule, Take 1 capsule by mouth Every Night. (Patient taking differently: Take 2 capsules by mouth Every Night.), Disp: 30 capsule, Rfl: 0    SUMAtriptan (IMITREX) 100 MG tablet, 1 (One) Time As Needed., Disp: , Rfl:     traMADol (ULTRAM) 50 MG tablet, tramadol 50 mg tablet, Disp: , Rfl:     ubrogepant (UBRELVY) 100 MG tablet, Take 1 tablet by mouth 1 (One) Time As Needed (migraine) for up to 30 doses., Disp: 30 tablet, Rfl: 0    Unable to find, 1 each As Needed. Med Name: Hemp Gummies, Disp: , Rfl:     vitamin D (ERGOCALCIFEROL) 1.25 MG (08939 UT) capsule capsule, Take 1 capsule by mouth 1 (One) Time Per Week., Disp: , Rfl:     predniSONE (DELTASONE) 20 MG tablet, Take 1 tablet by mouth 2 (Two) Times a Day for 7 days., Disp: 14 tablet, Rfl: 0  Review of Systems   Constitutional:  Positive for activity change and fever. Negative for chills.   HENT:  Positive for sore throat. Negative for congestion, ear pain, hoarse voice, postnasal drip,  sinus pressure, sinus pain, trouble swallowing and voice change.    Respiratory:  Negative for cough, shortness of breath and wheezing.    Cardiovascular:  Negative for chest pain.   Gastrointestinal:  Negative for nausea.   Musculoskeletal:  Negative for neck pain.   Neurological:  Positive for headaches.   Hematological:  Negative for adenopathy.     Objective   Physical Exam  Constitutional:       General: She is not in acute distress.     Appearance: She is not ill-appearing.   HENT:      Mouth/Throat:      Pharynx: Uvula midline. Posterior oropharyngeal erythema present. No oropharyngeal exudate.   Pulmonary:      Effort: Pulmonary effort is normal.   Neurological:      Mental Status: She is alert.   Psychiatric:         Speech: Speech normal.       Assessment & Plan   Diagnoses and all orders for this visit:    1. Acute pharyngitis, unspecified etiology (Primary)  -     predniSONE (DELTASONE) 20 MG tablet; Take 1 tablet by mouth 2 (Two) Times a Day for 7 days.  Dispense: 14 tablet; Refill: 0                    Follow Up:  If your symptoms are not resolving by the completion of your treatment or are worsening, see your primary care provider for follow up. If you don't have a primary care provider, you may go to any Urgent Care for re-evaluation. If you develop any life threatening symptoms, go to the nearest Emergency Department immediately or call EMS.               The use of  Video Visit was utilized during this visit, using both ChipX and Twilio/Epic. The use of a video visit has been reviewed with the patient and verbal informed consent has been obtained. No technical difficulties occurred during the visit.    is located at [address] [place] [id number]  Provider is located at [place], [place]

## 2023-05-17 DIAGNOSIS — R56.9 SEIZURES: Primary | ICD-10-CM

## 2023-05-18 ENCOUNTER — APPOINTMENT (OUTPATIENT)
Dept: CT IMAGING | Facility: HOSPITAL | Age: 53
End: 2023-05-18
Payer: COMMERCIAL

## 2023-05-18 ENCOUNTER — APPOINTMENT (OUTPATIENT)
Dept: GENERAL RADIOLOGY | Facility: HOSPITAL | Age: 53
End: 2023-05-18
Payer: COMMERCIAL

## 2023-05-18 ENCOUNTER — HOSPITAL ENCOUNTER (EMERGENCY)
Facility: HOSPITAL | Age: 53
Discharge: HOME | End: 2023-05-18
Attending: EMERGENCY MEDICINE
Payer: COMMERCIAL

## 2023-05-18 VITALS
TEMPERATURE: 97.5 F | WEIGHT: 181 LBS | OXYGEN SATURATION: 98 % | HEIGHT: 66 IN | RESPIRATION RATE: 18 BRPM | BODY MASS INDEX: 29.09 KG/M2 | HEART RATE: 68 BPM | DIASTOLIC BLOOD PRESSURE: 73 MMHG | SYSTOLIC BLOOD PRESSURE: 111 MMHG

## 2023-05-18 DIAGNOSIS — R11.0 NAUSEA: ICD-10-CM

## 2023-05-18 DIAGNOSIS — G43.909 MIGRAINE WITHOUT STATUS MIGRAINOSUS, NOT INTRACTABLE, UNSPECIFIED MIGRAINE TYPE: Primary | ICD-10-CM

## 2023-05-18 DIAGNOSIS — R42 LIGHTHEADED: ICD-10-CM

## 2023-05-18 LAB
ALBUMIN SERPL-MCNC: 3.9 G/DL (ref 3.5–5.2)
ALBUMIN/GLOB SERPL: 1.2 G/DL
ALP SERPL-CCNC: 70 U/L (ref 39–117)
ALT SERPL W P-5'-P-CCNC: 7 U/L (ref 1–33)
ANION GAP SERPL CALCULATED.3IONS-SCNC: 9 MMOL/L (ref 5–15)
AST SERPL-CCNC: 13 U/L (ref 1–32)
BACTERIA UR QL AUTO: ABNORMAL /HPF
BASOPHILS # BLD AUTO: 0.03 10*3/MM3 (ref 0–0.2)
BASOPHILS NFR BLD AUTO: 0.3 % (ref 0–1.5)
BILIRUB SERPL-MCNC: 0.2 MG/DL (ref 0–1.2)
BILIRUB UR QL STRIP: NEGATIVE
BUN SERPL-MCNC: 15 MG/DL (ref 6–20)
BUN/CREAT SERPL: 16.7 (ref 7–25)
CALCIUM SPEC-SCNC: 9.3 MG/DL (ref 8.6–10.5)
CHLORIDE SERPL-SCNC: 107 MMOL/L (ref 98–107)
CLARITY UR: ABNORMAL
CO2 SERPL-SCNC: 26 MMOL/L (ref 22–29)
COLOR UR: YELLOW
CREAT SERPL-MCNC: 0.9 MG/DL (ref 0.57–1)
DEPRECATED RDW RBC AUTO: 40.7 FL (ref 37–54)
EGFRCR SERPLBLD CKD-EPI 2021: 78 ML/MIN/1.73
EOSINOPHIL # BLD AUTO: 0.02 10*3/MM3 (ref 0–0.4)
EOSINOPHIL NFR BLD AUTO: 0.2 % (ref 0.3–6.2)
ERYTHROCYTE [DISTWIDTH] IN BLOOD BY AUTOMATED COUNT: 13.8 % (ref 12.3–15.4)
GLOBULIN UR ELPH-MCNC: 3.2 GM/DL
GLUCOSE SERPL-MCNC: 141 MG/DL (ref 65–99)
GLUCOSE UR STRIP-MCNC: NEGATIVE MG/DL
HCT VFR BLD AUTO: 37.4 % (ref 34–46.6)
HGB BLD-MCNC: 12.5 G/DL (ref 12–15.9)
HGB UR QL STRIP.AUTO: ABNORMAL
HOLD SPECIMEN: NORMAL
HYALINE CASTS UR QL AUTO: ABNORMAL /LPF
IMM GRANULOCYTES # BLD AUTO: 0.05 10*3/MM3 (ref 0–0.05)
IMM GRANULOCYTES NFR BLD AUTO: 0.4 % (ref 0–0.5)
KETONES UR QL STRIP: ABNORMAL
LEUKOCYTE ESTERASE UR QL STRIP.AUTO: NEGATIVE
LYMPHOCYTES # BLD AUTO: 1.76 10*3/MM3 (ref 0.7–3.1)
LYMPHOCYTES NFR BLD AUTO: 15.6 % (ref 19.6–45.3)
MAGNESIUM SERPL-MCNC: 2 MG/DL (ref 1.6–2.6)
MCH RBC QN AUTO: 27.5 PG (ref 26.6–33)
MCHC RBC AUTO-ENTMCNC: 33.4 G/DL (ref 31.5–35.7)
MCV RBC AUTO: 82.2 FL (ref 79–97)
MONOCYTES # BLD AUTO: 0.5 10*3/MM3 (ref 0.1–0.9)
MONOCYTES NFR BLD AUTO: 4.4 % (ref 5–12)
NEUTROPHILS NFR BLD AUTO: 79.1 % (ref 42.7–76)
NEUTROPHILS NFR BLD AUTO: 8.94 10*3/MM3 (ref 1.7–7)
NITRITE UR QL STRIP: NEGATIVE
NRBC BLD AUTO-RTO: 0 /100 WBC (ref 0–0.2)
PH UR STRIP.AUTO: 5.5 [PH] (ref 5–8)
PLATELET # BLD AUTO: 202 10*3/MM3 (ref 140–450)
PMV BLD AUTO: 8.9 FL (ref 6–12)
POTASSIUM SERPL-SCNC: 3.9 MMOL/L (ref 3.5–5.2)
PROT SERPL-MCNC: 7.1 G/DL (ref 6–8.5)
PROT UR QL STRIP: NEGATIVE
QT INTERVAL: 448 MS
QTC INTERVAL: 476 MS
RBC # BLD AUTO: 4.55 10*6/MM3 (ref 3.77–5.28)
RBC # UR STRIP: ABNORMAL /HPF
REF LAB TEST METHOD: ABNORMAL
SODIUM SERPL-SCNC: 142 MMOL/L (ref 136–145)
SP GR UR STRIP: 1.03 (ref 1–1.03)
SQUAMOUS #/AREA URNS HPF: ABNORMAL /HPF
TROPONIN T SERPL HS-MCNC: <6 NG/L
UROBILINOGEN UR QL STRIP: ABNORMAL
WBC # UR STRIP: ABNORMAL /HPF
WBC NRBC COR # BLD: 11.3 10*3/MM3 (ref 3.4–10.8)
WHOLE BLOOD HOLD COAG: NORMAL
WHOLE BLOOD HOLD SPECIMEN: NORMAL

## 2023-05-18 PROCEDURE — 93005 ELECTROCARDIOGRAM TRACING: CPT

## 2023-05-18 PROCEDURE — 25010000002 KETOROLAC TROMETHAMINE PER 15 MG: Performed by: NURSE PRACTITIONER

## 2023-05-18 PROCEDURE — 83735 ASSAY OF MAGNESIUM: CPT | Performed by: EMERGENCY MEDICINE

## 2023-05-18 PROCEDURE — 93005 ELECTROCARDIOGRAM TRACING: CPT | Performed by: EMERGENCY MEDICINE

## 2023-05-18 PROCEDURE — 25010000002 DIPHENHYDRAMINE PER 50 MG: Performed by: NURSE PRACTITIONER

## 2023-05-18 PROCEDURE — 36415 COLL VENOUS BLD VENIPUNCTURE: CPT

## 2023-05-18 PROCEDURE — 71045 X-RAY EXAM CHEST 1 VIEW: CPT

## 2023-05-18 PROCEDURE — 96374 THER/PROPH/DIAG INJ IV PUSH: CPT

## 2023-05-18 PROCEDURE — 70450 CT HEAD/BRAIN W/O DYE: CPT

## 2023-05-18 PROCEDURE — 84484 ASSAY OF TROPONIN QUANT: CPT | Performed by: EMERGENCY MEDICINE

## 2023-05-18 PROCEDURE — 81001 URINALYSIS AUTO W/SCOPE: CPT | Performed by: EMERGENCY MEDICINE

## 2023-05-18 PROCEDURE — 25010000002 PROCHLORPERAZINE 10 MG/2ML SOLUTION: Performed by: NURSE PRACTITIONER

## 2023-05-18 PROCEDURE — 80053 COMPREHEN METABOLIC PANEL: CPT | Performed by: EMERGENCY MEDICINE

## 2023-05-18 PROCEDURE — 85025 COMPLETE CBC W/AUTO DIFF WBC: CPT | Performed by: EMERGENCY MEDICINE

## 2023-05-18 PROCEDURE — 96375 TX/PRO/DX INJ NEW DRUG ADDON: CPT

## 2023-05-18 PROCEDURE — 99283 EMERGENCY DEPT VISIT LOW MDM: CPT

## 2023-05-18 RX ORDER — PROCHLORPERAZINE EDISYLATE 5 MG/ML
5 INJECTION INTRAMUSCULAR; INTRAVENOUS ONCE
Status: COMPLETED | OUTPATIENT
Start: 2023-05-18 | End: 2023-05-18

## 2023-05-18 RX ORDER — KETOROLAC TROMETHAMINE 15 MG/ML
15 INJECTION, SOLUTION INTRAMUSCULAR; INTRAVENOUS ONCE
Status: COMPLETED | OUTPATIENT
Start: 2023-05-18 | End: 2023-05-18

## 2023-05-18 RX ORDER — DIPHENHYDRAMINE HYDROCHLORIDE 50 MG/ML
25 INJECTION INTRAMUSCULAR; INTRAVENOUS ONCE
Status: COMPLETED | OUTPATIENT
Start: 2023-05-18 | End: 2023-05-18

## 2023-05-18 RX ORDER — SODIUM CHLORIDE 0.9 % (FLUSH) 0.9 %
10 SYRINGE (ML) INJECTION AS NEEDED
Status: DISCONTINUED | OUTPATIENT
Start: 2023-05-18 | End: 2023-05-18 | Stop reason: HOSPADM

## 2023-05-18 RX ADMIN — PROCHLORPERAZINE EDISYLATE 5 MG: 5 INJECTION INTRAMUSCULAR; INTRAVENOUS at 16:50

## 2023-05-18 RX ADMIN — DIPHENHYDRAMINE HYDROCHLORIDE 25 MG: 50 INJECTION INTRAMUSCULAR; INTRAVENOUS at 16:50

## 2023-05-18 RX ADMIN — SODIUM CHLORIDE 1000 ML: 9 INJECTION, SOLUTION INTRAVENOUS at 16:37

## 2023-05-18 RX ADMIN — KETOROLAC TROMETHAMINE 15 MG: 15 INJECTION, SOLUTION INTRAMUSCULAR; INTRAVENOUS at 16:51

## 2023-05-18 NOTE — Clinical Note
[facility] EMERGENCY DEPARTMENT  [address] [address]  Phone: [phone]    Sigrid Bello was seen and treated in our emergency department on 5/18/2023.  She may return to work on 05/21/2023.         Thank you for choosing [facility].    Roxanna Byrd RN

## 2023-05-18 NOTE — ED PROVIDER NOTES
EMERGENCY DEPARTMENT ENCOUNTER    Pt Name: Sigrid Bello  MRN: [id number]  Pt :   1970  Room Number:  Room/bed info not found  Date of encounter:  2023  PCP: Belen Mercer MD  ED Provider: TAWNY Boland    Historian: patient    HPI:  Chief Complaint: headache, lightheaded.     Context: Sigrid Bello is a 52 y.o. female who presents to the ED c/o headache, lightheaded.  Patient reports she is having generalized aching all over.  She complains of a history of migraines.  She reports that this taking her migraine started 2 days ago.  She advises that her neurologist Dr. Moses started her on her new medication.  She has had 2 doses.  She complains of nausea no vomiting.  She complains of chills and fever.  She reports she feels lightheaded and dizzy at times.  She has a history of fibromyalgia and reports she aches all over.  HPI     REVIEW OF SYSTEMS  A chief complaint appropriate review of systems was completed and is negative except as noted in the HPI.     PAST MEDICAL HISTORY  Past Medical History:   Diagnosis Date   • Anemia    • Anxiety and depression    • Arthritis    • Asthma    • Blastomycosis    • Diabetes mellitus    • Fibromyalgia    • Hypertension    • Hypokalemia    • Lupus    • Migraine    • MVP (mitral valve prolapse)    • PTSD (post-traumatic stress disorder)    • Tachycardia    • Uterine mass        PAST SURGICAL HISTORY  Past Surgical History:   Procedure Laterality Date   • ANKLE SURGERY      both ankles rebuilt   • INNER EAR SURGERY     • KNEE SURGERY     • TEETH EXTRACTION     • UVULECTOMY         FAMILY HISTORY  No family history on file.    SOCIAL HISTORY  Social History     Socioeconomic History   • Marital status: Single   Tobacco Use   • Smoking status: Never   • Smokeless tobacco: Never   Vaping Use   • Vaping Use: Never used   Substance and Sexual Activity   • Alcohol use: Yes     Alcohol/week: 1.0 standard drink     Types: 1 Glasses of wine per  week     Comment: occas   • Drug use: No   • Sexual activity: Defer       ALLERGIES  Meperidine, Morphine, Mushroom, Peanut oil, Penicillins, Clindamycin, Iodine, Tree nut, Cortisone, and Other    PHYSICAL EXAM  Physical Exam  GENERAL:   Appears in no acute distress. Ill  appearing.   HENT: Nares patent.  EYES: No scleral icterus.PERRL, EOMI  CV: Regular rhythm, regular rate.  RESPIRATORY: Normal effort.  No audible wheezes, rales or rhonchi.  ABDOMEN: Soft, nontender bowel sounds all 4 quadrants.   MUSCULOSKELETAL: No deformities.   NEURO: Alert, moves all extremities, follows commands.  SKIN: Warm, dry, no rash visualized.  PSYCH: alert and oriented x 3.   I have reviewed the triage vital signs and nursing notes.    Physical Exam     LAB RESULTS  Results for orders placed or performed during the hospital encounter of 05/18/23   ECG 12 Lead ED Triage Standing Order; Weak / Dizzy / AMS   Result Value Ref Range    QT Interval 448 ms    QTC Interval 476 ms       If labs were ordered, I independently reviewed the results and considered them in treating the patient.    RADIOLOGY  XR Chest 1 View    (Results Pending)     [x] Radiologist's Report Reviewed:  I ordered and independently reviewed the above noted radiographic studies.  See radiologist's dictation for official interpretation.      PROCEDURES    Procedures    ECG 12 Lead ED Triage Standing Order; Weak / Dizzy / AMS   Preliminary Result   Test Reason : ED Triage Standing Order~   Blood Pressure :   */*   mmHG   Vent. Rate :  68 BPM     Atrial Rate :  68 BPM      P-R Int : 134 ms          QRS Dur :  84 ms       QT Int : 448 ms       P-R-T Axes :  56 -11  52 degrees      QTc Int : 476 ms      Normal sinus rhythm   Minimal voltage criteria for LVH, may be normal variant   Nonspecific T wave abnormality   Abnormal ECG   No previous ECGs available      Referred By: EDMD           Confirmed By:           MEDICATIONS GIVEN IN ER    Medications   sodium chloride 0.9 %  flush 10 mL (has no administration in time range)       MEDICAL DECISION MAKING, PROGRESS, and CONSULTS   MDM    All labs have been independently reviewed by me.  All radiology studies have been reviewed by me and the radiologist dictating the report.  All EKG's have been independently viewed by me.    [] Discussed with radiology regarding test interpretation:    Discussion below represents my analysis of pertinent findings related to patient's condition, differential diagnosis, treatment plan and final disposition.    Differential diagnosis:  The differential diagnosis associated with the patient's presentation includes: migraine, electrolyte imbalance, dehydration, viral illness.     Additional sources  Discussed/ obtained information from independent historians:   [] Spouse  [] Parent  [] Family member  [] Friend  [] EMS   [] Other:  External (non-ED) record review:   [] Inpatient record:   [] Office record:   [] Outpatient record:   [] Prior Outpatient labs:   [] Prior Outpatient radiology:   [] Primary Care record:   [] Outside ED record:   [] Other:   Patient's care impacted by:   [] Diabetes  [] Hypertension  [] Hyperlipidemia  [] Hypothyroidism   [] Coronary Artery Disease   [] COPD   [] Cancer   [] Obesity  [] GERD   [] Tobacco Abuse   [] Substance Abuse    [] Anxiety   [] Depression   [] Other:   Care significantly affected by Social Determinants of Health (housing and economic circumstances, unemployment)    [] Yes     [] No   If yes, Patient's care significantly limited by  Social Determinants of Health including:   [] Inadequate housing   [] Low income   [] Alcoholism and drug addiction in family   [] Problems related to primary support group   [] Unemployment   [] Problems related to employment   [] Other Social Determinants of Health:     Shared decision making:  ***    Orders placed during this visit:  Orders Placed This Encounter   Procedures   • XR Chest 1 View   • [place] Draw   • Comprehensive  Metabolic Panel   • Single High Sensitivity Troponin T   • Magnesium   • Urinalysis With Microscopic If Indicated (No Culture) - Urine, Clean Catch   • CBC Auto Differential   • NPO Diet NPO Type: Strict NPO   • Undress & Gown   • Continuous Pulse Oximetry   • Vital Signs   • Orthostatic Blood Pressure   • Oxygen Therapy- Nasal Cannula; Titrate 1-6 LPM Per SpO2; 90 - 95%   • POC Glucose Once   • ECG 12 Lead ED Triage Standing Order; Weak / Dizzy / AMS   • Insert Peripheral IV   • Fall Precautions   • CBC & Differential   • Green Top (Gel)   • Lavender Top   • Gold Top - SST   • Gray Top   • Light Blue Top       I considered prescription management  with:   [] Pain medication  [] Antiviral  [] Antibiotic   [] Other:   Rationale:  Additional orders considered but not ordered:  The following testing was considered but ultimately not selected after discussion with patient/family:***    ED Course:    ED Course as of 05/18/23 1807   Thu May 18, 2023   1801 WBC(!): 11.30  Mild leukocytosis. [KG]   1801 HS Troponin T: <6  Negative troponin. [KG]   1802 Glucose(!): 141  Glucose is 141, patient has a history of type 2 diabetes. [KG]   1802 BUN is 15, creatinine is 0.90.  Patient's sodium is 142, potassium 3.9, chloride 107. [KG]   1802 CT of her head has no acute findings. [KG]      ED Course User Index  [KG] Daisha Roberts, APRSTEVEN            DIAGNOSIS  Final diagnoses:   None       DISPOSITION    ED Disposition     None          Please note that portions of this document were completed with voice recognition software.        Confirmed by DALIA KELLY ([id number]) on 5/23/2023 10:51:18 PM      Referred By: EDMD           Confirmed By: DALIA KELLY          MEDICATIONS GIVEN IN ER    Medications   sodium chloride 0.9 % bolus 1,000 mL (0 mL Intravenous Stopped 5/18/23 1810)   prochlorperazine (COMPAZINE) injection 5 mg (5 mg Intravenous Given 5/18/23 1650)   diphenhydrAMINE (BENADRYL) injection 25 mg (25 mg Intravenous Given 5/18/23 1650)   ketorolac (TORADOL) injection 15 mg (15 mg Intravenous Given 5/18/23 1651)       MEDICAL DECISION MAKING, PROGRESS, and CONSULTS   Medical Decision Making   Sigrid Bello is a 52 y.o. female who presents to the ED c/o headache, lightheaded.  Patient reports she is having generalized aching all over.  She complains of a history of migraines.  She reports that this taking her migraine started 2 days ago.  She advises that her neurologist Dr. Moses started her on her new medication.  She has had 2 doses.  She complains of nausea no vomiting.  She complains of chills and fever.  She reports she feels lightheaded and dizzy at times.  She has a history of fibromyalgia and reports she aches all over.    Lightheaded: acute illness or injury     Details: 1 L of IV fluids administered.    Migraine without status migrainosus, not intractable, unspecified migraine type: acute illness or injury     Details: Patient was treated with Toradol, Benadryl, Compazine.  Pain has subsided.  Nausea: acute illness or injury     Details: Patient was given Benadryl and Compazine IV.  Amount and/or Complexity of Data Reviewed  Labs: ordered. Decision-making details documented in ED Course.  Radiology: ordered. Decision-making details documented in ED Course.  ECG/medicine tests: ordered.      Risk  Prescription drug management.          All labs have been independently reviewed by me.  All radiology studies have been reviewed by me and the radiologist dictating the report.  All EKG's have been independently viewed by me.     [] Discussed with radiology regarding test interpretation:    Discussion below represents my analysis of pertinent findings related to patient's condition, differential diagnosis, treatment plan and final disposition.    Differential diagnosis:  The differential diagnosis associated with the patient's presentation includes: migraine, electrolyte imbalance, dehydration, viral illness.     Additional sources  Discussed/ obtained information from independent historians:   [] Spouse  [] Parent  [] Family member  [] Friend  [] EMS   [] Other:  External (non-ED) record review:   [x] Inpatient record:   [x] Office record:   [] Outpatient record:   [x] Prior Outpatient labs:   [x] Prior Outpatient radiology:   [] Primary Care record:   [] Outside ED record:   [] Other:   Patient's care impacted by:   [x] Diabetes  [x] Hypertension  [] Hyperlipidemia  [] Hypothyroidism   [] Coronary Artery Disease   [] COPD   [] Cancer   [] Obesity  [] GERD   [] Tobacco Abuse   [] Substance Abuse    [x] Anxiety   [x] Depression   [x] Other: Fibromyalgia  Care significantly affected by Social Determinants of Health (housing and economic circumstances, unemployment)    [] Yes     [x] No   If yes, Patient's care significantly limited by  Social Determinants of Health including:   [] Inadequate housing   [] Low income   [] Alcoholism and drug addiction in family   [] Problems related to primary support group   [] Unemployment   [] Problems related to employment   [] Other Social Determinants of Health:     Shared decision making:     Orders placed during this visit:  Orders Placed This Encounter   Procedures   • XR Chest 1 View   • CT Head Without Contrast   • Comprehensive Metabolic Panel   • Single High Sensitivity Troponin T   • Magnesium   • Urinalysis With Microscopic If Indicated (No Culture) - Urine, Clean Catch   • CBC Auto Differential   • Urinalysis, Microscopic Only - Urine, Clean Catch   • Undress & Gown   • Continuous Pulse  Oximetry   • Vital Signs   • ECG 12 Lead ED Triage Standing Order; Weak / Dizzy / AMS   • CBC & Differential   • Green Top (Gel)   • Lavender Top   • Gold Top - SST   • Light Blue Top       I considered prescription management  with:   [] Pain medication  [] Antiviral  [] Antibiotic   [] Other:   Rationale:  Additional orders considered but not ordered:  The following testing was considered but ultimately not selected after discussion with patient/family:    ED Course:    ED Course as of 05/24/23 1610   Thu May 18, 2023   1715 EKG reviewed normal sinus rhythm no obvious sign of ischemia, injury or infarct.  Heart rate 68. [KG]   1801 WBC(!): 11.30  Mild leukocytosis. [KG]   1801 HS Troponin T: <6  Negative troponin. [KG]   1802 Glucose(!): 141  Glucose is 141, patient has a history of type 2 diabetes. [KG]   1802 BUN is 15, creatinine is 0.90.  Patient's sodium is 142, potassium 3.9, chloride 107. [KG]   1802 CT of her head has no acute findings. [KG]      ED Course User Index  [KG] Daisha Roberts APRN            DIAGNOSIS  Final diagnoses:   Migraine without status migrainosus, not intractable, unspecified migraine type   Nausea   Lightheaded       DISPOSITION    ED Disposition     ED Disposition   Discharge    Condition   Stable    Comment   --             Please note that portions of this document were completed with voice recognition software.        Daisha Roberts APRN  05/24/23 1612

## 2023-05-19 NOTE — TELEPHONE ENCOUNTER
Called the pt and gave her the phone number to  behavioral health in [place], pt does not need a new referral/ SENT A [employer] MESSAGE WITH THE INFO ALSO

## 2023-05-22 ENCOUNTER — PATIENT MESSAGE (OUTPATIENT)
Dept: INTERNAL MEDICINE | Facility: CLINIC | Age: 53
End: 2023-05-22

## 2023-05-22 ENCOUNTER — OFFICE VISIT (OUTPATIENT)
Dept: INTERNAL MEDICINE | Facility: CLINIC | Age: 53
End: 2023-05-22

## 2023-05-22 VITALS
BODY MASS INDEX: 31.66 KG/M2 | DIASTOLIC BLOOD PRESSURE: 88 MMHG | WEIGHT: 197 LBS | HEIGHT: 66 IN | OXYGEN SATURATION: 98 % | SYSTOLIC BLOOD PRESSURE: 138 MMHG | HEART RATE: 100 BPM | TEMPERATURE: 96.8 F

## 2023-05-22 DIAGNOSIS — G43.919 INTRACTABLE MIGRAINE WITHOUT STATUS MIGRAINOSUS, UNSPECIFIED MIGRAINE TYPE: Primary | ICD-10-CM

## 2023-05-22 DIAGNOSIS — R61 NIGHT SWEATS: ICD-10-CM

## 2023-05-22 DIAGNOSIS — Z12.11 ENCOUNTER FOR SCREENING FOR MALIGNANT NEOPLASM OF COLON: Primary | ICD-10-CM

## 2023-05-22 PROCEDURE — 3044F HG A1C LEVEL LT 7.0%: CPT | Performed by: INTERNAL MEDICINE

## 2023-05-22 PROCEDURE — 99213 OFFICE O/P EST LOW 20 MIN: CPT | Performed by: INTERNAL MEDICINE

## 2023-05-22 RX ORDER — FUROSEMIDE 20 MG/1
TABLET ORAL
COMMUNITY

## 2023-05-22 RX ORDER — CLONAZEPAM 1 MG/1
TABLET ORAL
COMMUNITY

## 2023-05-22 RX ORDER — DULOXETIN HYDROCHLORIDE 60 MG/1
1 CAPSULE, DELAYED RELEASE ORAL DAILY
COMMUNITY
End: 2023-05-24 | Stop reason: SDUPTHER

## 2023-05-22 RX ORDER — ALPRAZOLAM 0.5 MG/1
TABLET ORAL DAILY
COMMUNITY

## 2023-05-22 RX ORDER — ERENUMAB-AOOE 70 MG/ML
INJECTION SUBCUTANEOUS
COMMUNITY
End: 2023-05-22

## 2023-05-22 NOTE — PROGRESS NOTES
"     Office Note      Date: 2023  Patient Name: Sigrid Bello  MRN: [id number]  : 1970    Chief Complaint   Patient presents with   • Migraine     ER follow up       History of Present Illness: Sigrid Bello is a 52 y.o. female who presents for Migraine (ER follow up).  Migraine somewhat improved. Reports migraine med has been approved by insurance whch she will start tomorrow. Has night sweats x few weeks. Last wbc elevated.     Subjective      Review of Systems:   Pertinent review of systems per HPI.  Review of systems otherwise negative    Review of Systems  Allergies   Allergen Reactions   • Meperidine Anaphylaxis and Unknown (See Comments)   • Morphine Shortness Of Breath and Unknown (See Comments)   • Mushroom Anaphylaxis   • Peanut Oil Anaphylaxis   • Penicillins Unknown (See Comments) and Anaphylaxis     Childhood reaction   • Clindamycin Hives   • Iodine Hives   • Tree Nut Unknown - High Severity   • Cortisone Rash and Unknown (See Comments)   • Other Other (See Comments)       Objective     Physical Exam:  Vital Signs:   Vitals:    23 1209   BP: 138/88   Pulse: 100   Temp: 96.8 °F (36 °C)   SpO2: 98%   Weight: 89.4 kg (197 lb)   Height: 167.6 cm (66\")   PainSc: 10-Worst pain ever   PainLoc: Back  Comment: pelvic area      Body mass index is 31.8 kg/m².    Physical Exam  Vitals and nursing note reviewed.   Constitutional:       General: She is not in acute distress.     Appearance: She is well-developed.   HENT:      Head: Normocephalic and atraumatic.      Right Ear: External ear normal.      Left Ear: External ear normal.   Cardiovascular:      Rate and Rhythm: Normal rate and regular rhythm.      Heart sounds: Normal heart sounds. No murmur heard.    No friction rub. No gallop.   Pulmonary:      Effort: Pulmonary effort is normal. No respiratory distress.      Breath sounds: Normal breath sounds. No wheezing or rales.   Skin:     General: Skin is warm and dry.      " Coloration: Skin is not pale.         Assessment / Plan      Assessment & Plan:    1. Intractable migraine without status migrainosus, unspecified migraine type  Continue follow-up with neurology    2. Night sweats  Repeat CBC  - CBC & Differential      Belen Mercer MD  05/22/2023   Answers for HPI/ROS submitted by the patient on 5/22/2023  Please describe your symptoms.: Lower back and abdomen pain  Have you had these symptoms before?: Yes  How long have you been having these symptoms?: Greater than 2 weeks  What is the primary reason for your visit?: Other

## 2023-05-23 ENCOUNTER — PATIENT MESSAGE (OUTPATIENT)
Dept: INTERNAL MEDICINE | Facility: CLINIC | Age: 53
End: 2023-05-23
Payer: COMMERCIAL

## 2023-05-23 LAB
QT INTERVAL: 448 MS
QTC INTERVAL: 476 MS

## 2023-05-23 NOTE — TELEPHONE ENCOUNTER
From: Sigrid Bello  To: Belen Mercer  Sent: 5/23/2023 3:36 AM EDT  Subject: Question regarding CBC W/ AUTO DIFFERENTIAL    What does this mean

## 2023-05-24 ENCOUNTER — TELEPHONE (OUTPATIENT)
Dept: PAIN MEDICINE | Facility: CLINIC | Age: 53
End: 2023-05-24
Payer: COMMERCIAL

## 2023-05-24 ENCOUNTER — TELEPHONE (OUTPATIENT)
Dept: INTERNAL MEDICINE | Facility: CLINIC | Age: 53
End: 2023-05-24
Payer: COMMERCIAL

## 2023-05-24 ENCOUNTER — TELEPHONE (OUTPATIENT)
Dept: NEUROLOGY | Facility: CLINIC | Age: 53
End: 2023-05-24

## 2023-05-24 ENCOUNTER — TELEMEDICINE (OUTPATIENT)
Dept: PSYCHIATRY | Facility: CLINIC | Age: 53
End: 2023-05-24
Payer: COMMERCIAL

## 2023-05-24 ENCOUNTER — TELEPHONE (OUTPATIENT)
Dept: PSYCHIATRY | Facility: CLINIC | Age: 53
End: 2023-05-24

## 2023-05-24 VITALS — BODY MASS INDEX: 30.83 KG/M2 | WEIGHT: 191 LBS

## 2023-05-24 DIAGNOSIS — F33.1 MODERATE EPISODE OF RECURRENT MAJOR DEPRESSIVE DISORDER: ICD-10-CM

## 2023-05-24 DIAGNOSIS — F51.01 PRIMARY INSOMNIA: ICD-10-CM

## 2023-05-24 DIAGNOSIS — F43.10 POST TRAUMATIC STRESS DISORDER (PTSD): Primary | ICD-10-CM

## 2023-05-24 RX ORDER — BUSPIRONE HYDROCHLORIDE 5 MG/1
5 TABLET ORAL 3 TIMES DAILY
Qty: 90 TABLET | Refills: 0 | Status: SHIPPED | OUTPATIENT
Start: 2023-05-24

## 2023-05-24 RX ORDER — DULOXETIN HYDROCHLORIDE 60 MG/1
60 CAPSULE, DELAYED RELEASE ORAL DAILY
Qty: 30 CAPSULE | Refills: 0 | Status: SHIPPED | OUTPATIENT
Start: 2023-05-24

## 2023-05-24 RX ORDER — TRAMADOL HYDROCHLORIDE 50 MG/1
TABLET ORAL
OUTPATIENT
Start: 2023-05-24

## 2023-05-24 RX ORDER — PRAZOSIN HYDROCHLORIDE 5 MG/1
10 CAPSULE ORAL NIGHTLY
Qty: 60 CAPSULE | Refills: 0 | Status: SHIPPED | OUTPATIENT
Start: 2023-05-24 | End: 2023-06-23

## 2023-05-24 NOTE — TELEPHONE ENCOUNTER
Provider:RAJ  Caller:STEF  Phone Number: [phone]  Reason for Call:PT CALLED AND  WANTS TO KNOW IF PAIN MANAGEMENT REFERRAL CAN BE SENT TO [facility] AS THIS IS THE ONE THAT CAN GET HER IN THE SOONEST. PT STATES THAT SHE HAD A APPT WITH  PAIN MANAGEMENT TODAY 05/24/23 AND WAS NOT TOLD THE CORRECT LOCATION SEVERAL TIMES AND MISSED APPT AND WAS UNABLE TO BE SEEN. PT STATES THAT SHE HAD TO MISS WORKING AND STILL IN A LOT OF PAIN AND IS NEEDING TO BE SEEN SOME WHERE AS SOON AS POSSIBLE.    PT WOULD ALSO LIKE TO KNOW IF PROVIDER IS GOING TO FILL OUT INTERMITTENT PAPER WORK AS [facility]TS [facility] REACHED OUT TO HER TODAY STATING THEY HAVE NOT RECEIVED ANYTHING FOR PROVIDER.        PLEASE REVIEW AND ADVISE.  THANK YOU

## 2023-05-24 NOTE — TELEPHONE ENCOUNTER
Caller:RAJ     Relationship:  RAEANN Wilkins call back number: [phone]    Requested Prescriptions:   Requested Prescriptions     Pending Prescriptions Disp Refills   • traMADol (ULTRAM) 50 MG tablet          Pharmacy where request should be sent:  [facility] [place]    Last office visit with prescribing clinician: 5/4/2023   Last telemedicine visit with prescribing clinician: Visit date not found   Next office visit with prescribing clinician: 6/27/2023     Additional details provided by patient:PT WANTS TO KNOW IF PROVIDER CAN FILL THIS MEDICATION UNTIL SHE IS SEE BY PAIN MANAGEMENT PROVIDER. SHE CAN NO LONGER SEE THE PROSCRIBER AS THEY NO LONGER ACCEPT PTS INSURANCE.  PT IS OUT OF MEDICATION   Does the patient have less than a 3 day supply:  [x] Yes  [] No        Alba Chávez Rep   05/24/23 11:18 EDT

## 2023-05-24 NOTE — TELEPHONE ENCOUNTER
Faxed [facility] ppw to [phone] with success.  Will also send her a exoro systemt message letting her know it has been sent      Also she is wanting to switch her pain management referral to [facility].

## 2023-05-24 NOTE — PLAN OF CARE
Psychotropic medication management with SSRI therapy to treat PTSD  Ambulatory referral for psychotherapy

## 2023-05-24 NOTE — TELEPHONE ENCOUNTER
LVM ADVISING THE PT WE CANT GET HER IN ANYWHERE TODAY, BUT I CAN SEND HER REFERRAL OUTSIDE OF , WAITING FOR PT'S ANSWER.

## 2023-05-24 NOTE — PROGRESS NOTES
This provider is located at the [facility] (through [facility]), [address] [facility] [address], [facility], [address] using a secure [employer] Video Visit through [employer]. Patient is being seen remotely via telehealth at their home address [address] [place] [address], and stated they are in a secure environment for this session. The patient's condition being diagnosed/treated is appropriate for telemedicine. The provider identified herself as well as her credentials.   The patient, and/or patients guardian, consent to be seen remotely, and when consent is given they understand that the consent allows for patient identifiable information to be sent to a third party as needed.   They may refuse to be seen remotely at any time. The electronic data is encrypted and password protected, and the patient and/or guardian has been advised of the potential risks to privacy not withstanding such measures.    You have chosen to receive care through a telehealth visit.  Do you consent to use a video/audio connection for your medical care today? Yes    Patient identifiers utilized: Name and date of birth.    Patient verbally confirmed consent for today's encounter:  May 24, 2023    Joey Bello is a 52 y.o. female who presents today for initial evaluation     Chief Complaint:    Chief Complaint   Patient presents with   • Psychiatric Evaluation     PTSD, depression          History of Present Illness:    History of Present Illness  Medical history:  DM, seizures, dystonia, fibromyalgia, migraines, vitamin D deficiency, afib, mitral valve prolapse, ATUL, asthma, deaf in left ear, recently found out she has a mass in her uterus that is going to require a total hysterectomy    Last seizure: 1 ½ month ago    Surgical history:  Ear drum replaced left ear,   Right knee surgery     Illicit substance use:  Denies    ETOH:  Denies    Psychosocial history:  Born/Raised: [place], [place]  Raised  "by: grandparents  Siblings: none  Describes childhood as: mother was never around, father passed away when she was 6 years old.  Abuse history:  \" Physical: denies  \" Emotional: denies  \" Mental: denies  \" Sexual: uncle  \" Rape: denies    Highest education level achieved: associates degree/MA diploma/phlebotomy   History of bullying? 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   history: denies  Impulsive or risky behavior: denies  Legal history/previous chargers or incarcerations: denies  Marital status: currently engaged  Number of children: 2  Self-harming behavior: denies  Suicidal attempts: denies   Suicidal thoughts: endorses but states he has a service animal that assists her with this; currently denies thoughts, intent or plan and is convincing  Family suicide history: 3 paternal uncles completed  Social support: uncle, fiancé,     Previous diagnoses: PTSD, ADHD, bipolar depression  Inpatient psychiatric admissions: denies  History of psychotherapy: \"for years\"; July 2022 last therapy session  History of behavioral health treatment: Dr. Yu   Medications trialed: Xanax, Klonopin, Lunesta, Prazosin, Cymbalta, Lexparo, Zoloft, Hydroxyzine, Buspar  Family mental health:     Symptom burden:  Sleep: 2 hours last night; reports poor sleep, this has worsened since finding out about a mass in her uterus that is going to require a total hysterectomy  Appetite: poor appetite, has times she has to force herself to eat  Anxiety: racing heart, starts shaking, overwhelmed,   Paranoia: endorses, states it is rare but more so at night, she feels like someone is watching her while she is sleeping  Hallucinations: denies  Mood fluctuations/irritability:   CHERI score 21  PHQ 9 score 22    Patient states she did well on a few of her medications in the past but Dr. Yu discontinued them to use Xanax.    "         The following portions of the patient's history were reviewed and updated as appropriate: allergies, current medications, past family history, past medical history, past social history, past surgical history and problem list.    Past Psychiatric History:  Began Treatment:years ago  Diagnoses:PTSD, ADHD, bipolar depression but states her psychiatrist told her she was not bipolar  Psychiatrist:Dr. Yu-no longer accepts patient insurance  Therapist:previously through Dr. Yu-no longer accepts patient insurance  Admission History:Denies  Medication Trials:Xanax, Klonopin, Lunesta, Prazosin, Cymbalta, Lexparo, Hydroxyzine  Self Harm: Denies  Suicide Attempts:Denies   Psychosis, Anxiety, Depression: Denies    Past Medical History:  Past Medical History:   Diagnosis Date   • ADHD (attention deficit hyperactivity disorder)    • Anemia    • Anxiety and depression    • Arthritis    • Asthma    • Blastomycosis    • Diabetes mellitus    • Fibromyalgia    • Fibromyalgia, primary    • History of medical problems    • HL (hearing loss)    • Hyperlipidemia    • Hypertension    • Hypokalemia    • Low back pain    • Lupus    • Migraine    • MVP (mitral valve prolapse)    • Obesity    • PTSD (post-traumatic stress disorder)    • Seizures    • Tachycardia    • Uterine mass        Substance Abuse History:   Types:Denies all, including illicit  Withdrawal Symptoms:Denies  Longest Period Sober:Not Applicable   AA: Not applicable     Social History:  Social History     Socioeconomic History   • Marital status: Single   Tobacco Use   • Smoking status: Never   • Smokeless tobacco: Never   Vaping Use   • Vaping Use: Never used   Substance and Sexual Activity   • Alcohol use: Not Currently     Alcohol/week: 1.0 standard drink     Types: 1 Glasses of wine per week     Comment: occas   • Drug use: No   • Sexual activity: Not Currently     Partners: Male     Birth control/protection: None       Family History:  Family  History   Problem Relation Age of Onset   • Anxiety disorder Father    • Asthma Paternal Grandfather    • Cancer Paternal Grandfather         Mike   • Depression Paternal Grandfather    • Diabetes Paternal Grandfather    • Hearing loss Paternal Grandfather        Past Surgical History:  Past Surgical History:   Procedure Laterality Date   • ANKLE SURGERY      both ankles rebuilt   • INNER EAR SURGERY     • KNEE SURGERY     • TEETH EXTRACTION     • UVULECTOMY         Problem List:  Patient Active Problem List   Diagnosis   • Mitral valve prolapse   • Fibromyalgia   • PTSD (post-traumatic stress disorder)   • Lupus   • Asthma   • Migraine without aura   • Dystonia   • Type 1 diabetes mellitus with other specified complication   • Primary insomnia   • Anxiety   • Uterine fibroid   • Pelvic pain   • Left kidney mass   • Non-rheumatic mitral regurgitation   • PVC's (premature ventricular contractions)   • Blastomycosis   • High risk medication use   • Dizziness   • Angiomyolipoma of left kidney   • Hemangioma   • Hyperlipidemia   • Hypokalemia   • 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, nonintract, 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   • Near syncope   • Iron deficiency anemia   • Myalgia and myositis   • Obstructive sleep apnea syndrome   • Osteoarthritis   • Restless legs syndrome   • Sleep disorder   • Vitamin D deficiency   • Other chronic nonalcoholic liver disease   • Rheumatoid arthritis   • Seizure disorder       Allergy:   Allergies   Allergen Reactions   • Meperidine Anaphylaxis and Unknown (See Comments)   • Morphine Shortness Of Breath and Unknown (See Comments)   • Mushroom Anaphylaxis   • Peanut Oil Anaphylaxis   • Penicillins Unknown (See Comments) and Anaphylaxis     Childhood reaction   • Clindamycin Hives   • Iodine Hives   • Tree Nut Unknown - High Severity   • Cortisone Rash and Unknown (See Comments)   • Other Other (See Comments)        Current Medications:   Current Outpatient Medications   Medication Sig Dispense  Refill   • albuterol sulfate  (90 Base) MCG/ACT inhaler Inhale 2 puffs Every 4 (Four) Hours As Needed for Wheezing.     • ALPRAZolam (XANAX) 0.5 MG tablet Take  by mouth Daily.     • aspirin 81 MG EC tablet Take 1 tablet by mouth Daily.     • Continuous Blood Gluc  (FreeStyle Suma 2 Secretary) device 1 Device 2 (Two) Times a Day. 1 each 2   • Continuous Blood Gluc Sensor (FreeStyle Suma Sensor System) Every 10 (Ten) Days. 3 each 5   • Continuous Blood Gluc Transmit (Dexcom G6 Transmitter) misc 1 Device See Admin Instructions. 1 each 0   • diphenhydrAMINE (BENADRYL) 25 MG tablet Take 1 tablet by mouth At Night As Needed for Itching.     • DULoxetine (CYMBALTA) 60 MG capsule Take 1 capsule by mouth Daily. 30 capsule 0   • EPINEPHrine 0.1 MG/0.1ML solution auto-injector Inject  as directed.     • eszopiclone (LUNESTA) 3 MG tablet Take 1 tablet by mouth Daily. 30 tablet 2   • ferrous sulfate 324 (65 Fe) MG tablet delayed-release EC tablet Take 1 tablet by mouth 2 (Two) Times a Day With Meals. 2 tabs BID     • fluticasone-salmeterol (ADVAIR) 250-50 MCG/DOSE DISKUS Inhale 2 puffs As Needed.     • Fremanezumab-vfrm (Ajovy) 225 MG/1.5ML solution auto-injector Inject 225 mg under the skin into the appropriate area as directed Every 30 (Thirty) Days. 1.5 mL 11   • furosemide (LASIX) 20 MG tablet Take  by mouth.     • gabapentin (NEURONTIN) 300 MG capsule Take 1 capsule by mouth 3 (Three) Times a Day.     • glucagon (GLUCAGEN) 1 MG injection Glucagon Emergency Kit 1 mg solution for injection     • HumaLOG KwikPen 100 UNIT/ML solution pen-injector Sliding scale     • hydrOXYzine (ATARAX) 10 MG tablet Every 4 (Four) Hours As Needed.     • Insulin Glargine (BASAGLAR KWIKPEN) 100 UNIT/ML injection pen 10 Units 2 (Two) Times a Day.     • levETIRAcetam (KEPPRA) 500 MG tablet Take 1 tablet by mouth 2 (Two) Times a Day for 90 days. 60 tablet 2   • Magnesium 200 MG chewable tablet Chew Daily.     • meloxicam (MOBIC) 7.5  MG tablet Take 1 tablet by mouth Daily.     • metoprolol tartrate (LOPRESSOR) 50 MG tablet Take 1 tablet by mouth 2 (Two) Times a Day. 180 tablet 3   • nitroglycerin (NITROSTAT) 0.6 MG SL tablet place 1 tablet (0.6 mg) by sublingual route at the first sign of an attack; may repeat every 5 minutes, until relief; if pain persists after a total of 3 tablets in 15 minutes, prompt medical attention is recommended     • ondansetron (ZOFRAN) 4 MG tablet Take 1 tablet by mouth Every 8 (Eight) Hours As Needed for Nausea or Vomiting. 30 tablet 0   • Potassium Chloride (KLOR-CON 10 PO) Take 1 tablet by mouth 2 (two) times a day.     • SUMAtriptan (IMITREX) 100 MG tablet 1 (One) Time As Needed.     • traMADol (ULTRAM) 50 MG tablet tramadol 50 mg tablet     • Unable to find 1 each As Needed. Med Name: Hemp Gummies     • vitamin D (ERGOCALCIFEROL) 1.25 MG (42696 UT) capsule capsule Take 1 capsule by mouth 1 (One) Time Per Week.     • busPIRone (BUSPAR) 5 MG tablet Take 1 tablet by mouth 3 (Three) Times a Day. 90 tablet 0   • butalbital-aspirin-caffeine (FIORINAL) -40 MG per tablet Take 1 tablet by mouth Every Morning. (Patient not taking: Reported on 5/24/2023)     • clonazePAM (KlonoPIN) 1 MG tablet Take  by mouth. (Patient not taking: Reported on 5/24/2023)     • prazosin (MINIPRESS) 5 MG capsule Take 2 capsules by mouth Every Night for 30 days. 60 capsule 0   • sertraline (Zoloft) 50 MG tablet Take 0.5 tablets by mouth Daily for 7 days, THEN 1 tablet Daily for 23 days. 27 tablet 0   • ubrogepant (UBRELVY) 100 MG tablet Take 1 tablet by mouth 1 (One) Time As Needed (migraine) for up to 30 doses. 30 tablet 0     No current facility-administered medications for this visit.       Review of Systems:    Review of Systems   Constitutional: Positive for appetite change and fatigue.   Psychiatric/Behavioral: Positive for sleep disturbance, depressed mood and stress. The patient is nervous/anxious.    All other systems reviewed  and are negative.        Physical Exam:   Physical Exam  Constitutional:       General: She is awake.      Appearance: Normal appearance. She is well-developed and well-groomed.   Neurological:      Mental Status: She is alert and oriented to person, place, and time.   Psychiatric:         Attention and Perception: Attention and perception normal.         Mood and Affect: Mood is anxious and depressed. Affect is tearful.         Speech: Speech normal.         Behavior: Behavior normal. Behavior is cooperative.         Thought Content: Thought content normal.         Cognition and Memory: Cognition and memory normal.         Judgment: Judgment normal.         Vitals:  Weight 86.6 kg (191 lb). Body mass index is 30.83 kg/m².  Due to extenuating circumstances and possible current health risks associated with the patient being present in a clinical setting (with current health restrictions in place in regards to possible COVID 19 transmission/exposure), the patient was seen remotely today via a MyChart Video Visit through [facility].  Unable to obtain vital signs due to nature of remote visit.  Height stated at 66 inches.  Weight stated at 191 pounds.    Last 3 Blood Pressure Readings:  BP Readings from Last 3 Encounters:   05/22/23 138/88   05/18/23 111/73   05/12/23 142/88       PHQ-9 Score:   PHQ-9 Total Score:      CHERI-7 Score:   Feeling nervous, anxious or on edge: (P) Nearly every day  Not being able to stop or control worrying: (P) Nearly every day  Worrying too much about different things: (P) Nearly every day  Trouble Relaxing: (P) Nearly every day  Being so restless that it is hard to sit still: (P) Nearly every day  Feeling afraid as if something awful might happen: (P) Nearly every day  Becoming easily annoyed or irritable: (P) Nearly every day  CHERI 7 Total Score: (P) 21  If you checked any problems, how difficult have these problems made it for you to do your work, take care of things at home, or get along with  other people: (P) Extremely difficult     Mental Status Exam:   Hygiene:   good  Cooperation:  Cooperative  Eye Contact:  Good  Psychomotor Behavior:  Restless  Affect:  Full range  Mood: anxious  Hopelessness: Denies  Speech:  Normal  Thought Process:  Goal directed and Linear  Thought Content:  Mood congruent  Suicidal:  None  Homicidal:  None  Hallucinations:  None  Delusion:  None  Memory:  Intact  Orientation:  Person, Place, Time and Situation  Reliability:  good  Insight:  Good  Judgement:  Good  Impulse Control:  Good  Physical/Medical Issues:  Yes DM, seizures, dystonia, fibromyalgia, migraines, vitamin D deficiency, afib, mitral valve prolapse, ATUL, asthma, deaf in left ear, recently found out she has a mass in her uterus that is going to require a total hysterectomy       Lab Results:   Admission on 05/18/2023, Discharged on 05/18/2023   Component Date Value Ref Range Status   • QT Interval 05/18/2023 448  ms Final   • QTC Interval 05/18/2023 476  ms Final   • Glucose 05/18/2023 141 (H)  65 - 99 mg/dL Final   • BUN 05/18/2023 15  6 - 20 mg/dL Final   • Creatinine 05/18/2023 0.90  0.57 - 1.00 mg/dL Final   • Sodium 05/18/2023 142  136 - 145 mmol/L Final   • Potassium 05/18/2023 3.9  3.5 - 5.2 mmol/L Final    Slight hemolysis detected by analyzer. Results may be affected.   • Chloride 05/18/2023 107  98 - 107 mmol/L Final   • CO2 05/18/2023 26.0  22.0 - 29.0 mmol/L Final   • Calcium 05/18/2023 9.3  8.6 - 10.5 mg/dL Final   • Total Protein 05/18/2023 7.1  6.0 - 8.5 g/dL Final   • Albumin 05/18/2023 3.9  3.5 - 5.2 g/dL Final   • ALT (SGPT) 05/18/2023 7  1 - 33 U/L Final   • AST (SGOT) 05/18/2023 13  1 - 32 U/L Final   • Alkaline Phosphatase 05/18/2023 70  39 - 117 U/L Final   • Total Bilirubin 05/18/2023 0.2  0.0 - 1.2 mg/dL Final   • Globulin 05/18/2023 3.2  gm/dL Final    Calculated Result   • A/G Ratio 05/18/2023 1.2  g/dL Final   • BUN/Creatinine Ratio 05/18/2023 16.7  7.0 - 25.0 Final   • Anion Gap  05/18/2023 9.0  5.0 - 15.0 mmol/L Final   • eGFR 05/18/2023 78.0  >60.0 mL/min/1.73 Final   • HS Troponin T 05/18/2023 <6  <10 ng/L Final   • Magnesium 05/18/2023 2.0  1.6 - 2.6 mg/dL Final   • Color, UA 05/18/2023 Yellow  Yellow, Straw Final   • Appearance, UA 05/18/2023 Cloudy (A)  Clear Final   • pH, UA 05/18/2023 5.5  5.0 - 8.0 Final   • Specific Gravity, UA 05/18/2023 1.028  1.001 - 1.030 Final   • Glucose, UA 05/18/2023 Negative  Negative Final   • Ketones, UA 05/18/2023 Trace (A)  Negative Final   • Bilirubin, UA 05/18/2023 Negative  Negative Final   • Blood, UA 05/18/2023 Small (1+) (A)  Negative Final   • Protein, UA 05/18/2023 Negative  Negative Final   • Leuk Esterase, UA 05/18/2023 Negative  Negative Final   • Nitrite, UA 05/18/2023 Negative  Negative Final   • Urobilinogen, UA 05/18/2023 0.2 E.U./dL  0.2 - 1.0 E.U./dL Final   • Extra Tube 05/18/2023 Hold for add-ons.   Final    Auto resulted.   • Extra Tube 05/18/2023 hold for add-on   Final    Auto resulted   • Extra Tube 05/18/2023 Hold for add-ons.   Final    Auto resulted.   • Extra Tube 05/18/2023 Hold for add-ons.   Final    Auto resulted.   • Extra Tube 05/18/2023 Hold for add-ons.   Final    Auto resulted   • WBC 05/18/2023 11.30 (H)  3.40 - 10.80 10*3/mm3 Final   • RBC 05/18/2023 4.55  3.77 - 5.28 10*6/mm3 Final   • Hemoglobin 05/18/2023 12.5  12.0 - 15.9 g/dL Final   • Hematocrit 05/18/2023 37.4  34.0 - 46.6 % Final   • MCV 05/18/2023 82.2  79.0 - 97.0 fL Final   • MCH 05/18/2023 27.5  26.6 - 33.0 pg Final   • MCHC 05/18/2023 33.4  31.5 - 35.7 g/dL Final   • RDW 05/18/2023 13.8  12.3 - 15.4 % Final   • RDW-SD 05/18/2023 40.7  37.0 - 54.0 fl Final   • MPV 05/18/2023 8.9  6.0 - 12.0 fL Final   • Platelets 05/18/2023 202  140 - 450 10*3/mm3 Final   • Neutrophil % 05/18/2023 79.1 (H)  42.7 - 76.0 % Final   • Lymphocyte % 05/18/2023 15.6 (L)  19.6 - 45.3 % Final   • Monocyte % 05/18/2023 4.4 (L)  5.0 - 12.0 % Final   • Eosinophil % 05/18/2023 0.2  (L)  0.3 - 6.2 % Final   • Basophil % 05/18/2023 0.3  0.0 - 1.5 % Final   • Immature Grans % 05/18/2023 0.4  0.0 - 0.5 % Final   • Neutrophils, Absolute 05/18/2023 8.94 (H)  1.70 - 7.00 10*3/mm3 Final   • Lymphocytes, Absolute 05/18/2023 1.76  0.70 - 3.10 10*3/mm3 Final   • Monocytes, Absolute 05/18/2023 0.50  0.10 - 0.90 10*3/mm3 Final   • Eosinophils, Absolute 05/18/2023 0.02  0.00 - 0.40 10*3/mm3 Final   • Basophils, Absolute 05/18/2023 0.03  0.00 - 0.20 10*3/mm3 Final   • Immature Grans, Absolute 05/18/2023 0.05  0.00 - 0.05 10*3/mm3 Final   • nRBC 05/18/2023 0.0  0.0 - 0.2 /100 WBC Final   • RBC, UA 05/18/2023 0-2  None Seen, 0-2 /HPF Final   • WBC, UA 05/18/2023 3-5 (A)  None Seen, 0-2 /HPF Final   • Bacteria, UA 05/18/2023 None Seen  None Seen, Trace /HPF Final   • Squamous Epithelial Cells, UA 05/18/2023 7-12 (A)  None Seen, 0-2 /HPF Final   • Hyaline Casts, UA 05/18/2023 0-6  0 - 6 /LPF Final   • Methodology 05/18/2023 Automated Microscopy   Final   Lab on 05/03/2023   Component Date Value Ref Range Status   • Free T4 05/03/2023 1.27  0.93 - 1.70 ng/dL Final   • TSH 05/03/2023 2.480  0.270 - 4.200 uIU/mL Final   • Hemoglobin A1C 05/03/2023 5.10  4.80 - 5.60 % Final   • WBC 05/03/2023 6.23  3.40 - 10.80 10*3/mm3 Final   • RBC 05/03/2023 4.96  3.77 - 5.28 10*6/mm3 Final   • Hemoglobin 05/03/2023 13.5  12.0 - 15.9 g/dL Final   • Hematocrit 05/03/2023 40.2  34.0 - 46.6 % Final   • MCV 05/03/2023 81.0  79.0 - 97.0 fL Final   • MCH 05/03/2023 27.2  26.6 - 33.0 pg Final   • MCHC 05/03/2023 33.6  31.5 - 35.7 g/dL Final   • RDW 05/03/2023 14.7  12.3 - 15.4 % Final   • RDW-SD 05/03/2023 42.6  37.0 - 54.0 fl Final   • MPV 05/03/2023 10.4  6.0 - 12.0 fL Final   • Platelets 05/03/2023 195  140 - 450 10*3/mm3 Final   • Neutrophil % 05/03/2023 52.8  42.7 - 76.0 % Final   • Lymphocyte % 05/03/2023 40.1  19.6 - 45.3 % Final   • Monocyte % 05/03/2023 5.3  5.0 - 12.0 % Final   • Eosinophil % 05/03/2023 1.0  0.3 - 6.2 % Final    • Basophil % 05/03/2023 0.6  0.0 - 1.5 % Final   • Immature Grans % 05/03/2023 0.2  0.0 - 0.5 % Final   • Neutrophils, Absolute 05/03/2023 3.29  1.70 - 7.00 10*3/mm3 Final   • Lymphocytes, Absolute 05/03/2023 2.50  0.70 - 3.10 10*3/mm3 Final   • Monocytes, Absolute 05/03/2023 0.33  0.10 - 0.90 10*3/mm3 Final   • Eosinophils, Absolute 05/03/2023 0.06  0.00 - 0.40 10*3/mm3 Final   • Basophils, Absolute 05/03/2023 0.04  0.00 - 0.20 10*3/mm3 Final   • Immature Grans, Absolute 05/03/2023 0.01  0.00 - 0.05 10*3/mm3 Final   • nRBC 05/03/2023 0.0  0.0 - 0.2 /100 WBC Final   Lab on 01/23/2023   Component Date Value Ref Range Status   • Glucose 01/23/2023 80  65 - 99 mg/dL Final   • BUN 01/23/2023 13  6 - 20 mg/dL Final   • Creatinine 01/23/2023 0.80  0.57 - 1.00 mg/dL Final   • Sodium 01/23/2023 141  136 - 145 mmol/L Final   • Potassium 01/23/2023 4.4  3.5 - 5.2 mmol/L Final   • Chloride 01/23/2023 106  98 - 107 mmol/L Final   • CO2 01/23/2023 24.4  22.0 - 29.0 mmol/L Final   • Calcium 01/23/2023 8.8  8.6 - 10.5 mg/dL Final   • Total Protein 01/23/2023 6.8  6.0 - 8.5 g/dL Final   • Albumin 01/23/2023 3.8  3.5 - 5.2 g/dL Final   • ALT (SGPT) 01/23/2023 7  1 - 33 U/L Final   • AST (SGOT) 01/23/2023 15  1 - 32 U/L Final   • Alkaline Phosphatase 01/23/2023 72  39 - 117 U/L Final   • Total Bilirubin 01/23/2023 <0.2  0.0 - 1.2 mg/dL Final   • Globulin 01/23/2023 3.0  gm/dL Final   • A/G Ratio 01/23/2023 1.3  g/dL Final   • BUN/Creatinine Ratio 01/23/2023 16.3  7.0 - 25.0 Final   • Anion Gap 01/23/2023 10.6  5.0 - 15.0 mmol/L Final   • eGFR 01/23/2023 89.9  >60.0 mL/min/1.73 Final   • Hemoglobin A1C 01/23/2023 5.00  4.80 - 5.60 % Final   • Total Cholesterol 01/23/2023 155  0 - 200 mg/dL Final   • Triglycerides 01/23/2023 217 (H)  0 - 150 mg/dL Final   • HDL Cholesterol 01/23/2023 39 (L)  40 - 60 mg/dL Final   • LDL Cholesterol  01/23/2023 80  0 - 100 mg/dL Final   • VLDL Cholesterol 01/23/2023 36  5 - 40 mg/dL Final   • LDL/HDL  Ratio 01/23/2023 1.86   Final   • Microalbumin, Urine 01/23/2023 <1.2  mg/dL Final   • T3, Free 01/23/2023 2.82  2.00 - 4.40 pg/mL Final   • Free T4 01/23/2023 1.24  0.93 - 1.70 ng/dL Final   • TSH 01/23/2023 2.420  0.270 - 4.200 uIU/mL Final   • THC, Screen, Urine 01/23/2023 Negative  Negative Final   • Phencyclidine (PCP), Urine 01/23/2023 Negative  Negative Final   • Cocaine Screen, Urine 01/23/2023 Negative  Negative Final   • Methamphetamine, Ur 01/23/2023 Negative  Negative Final   • Opiate Screen 01/23/2023 Negative  Negative Final   • Amphetamine Screen, Urine 01/23/2023 Negative  Negative Final   • Benzodiazepine Screen, Urine 01/23/2023 Negative  Negative Final   • Tricyclic Antidepressants Screen 01/23/2023 Negative  Negative Final   • Methadone Screen, Urine 01/23/2023 Negative  Negative Final   • Barbiturates Screen, Urine 01/23/2023 Negative  Negative Final   • Oxycodone Screen, Urine 01/23/2023 Negative  Negative Final   • Propoxyphene Screen 01/23/2023 Negative  Negative Final   • Buprenorphine, Screen, Urine 01/23/2023 Negative  Negative Final   • Vitamin B-12 01/23/2023 622  211 - 946 pg/mL Final   • 25 Hydroxy, Vitamin D 01/23/2023 35.4  30.0 - 100.0 ng/ml Final   • WBC 01/23/2023 5.67  3.40 - 10.80 10*3/mm3 Final   • RBC 01/23/2023 4.53  3.77 - 5.28 10*6/mm3 Final   • Hemoglobin 01/23/2023 11.8 (L)  12.0 - 15.9 g/dL Final   • Hematocrit 01/23/2023 35.9  34.0 - 46.6 % Final   • MCV 01/23/2023 79.2  79.0 - 97.0 fL Final   • MCH 01/23/2023 26.0 (L)  26.6 - 33.0 pg Final   • MCHC 01/23/2023 32.9  31.5 - 35.7 g/dL Final   • RDW 01/23/2023 14.1  12.3 - 15.4 % Final   • RDW-SD 01/23/2023 40.4  37.0 - 54.0 fl Final   • MPV 01/23/2023 9.9  6.0 - 12.0 fL Final   • Platelets 01/23/2023 262  140 - 450 10*3/mm3 Final   • Neutrophil % 01/23/2023 56.1  42.7 - 76.0 % Final   • Lymphocyte % 01/23/2023 36.5  19.6 - 45.3 % Final   • Monocyte % 01/23/2023 5.8  5.0 - 12.0 % Final   • Eosinophil % 01/23/2023 0.9  0.3  - 6.2 % Final   • Basophil % 01/23/2023 0.5  0.0 - 1.5 % Final   • Immature Grans % 01/23/2023 0.2  0.0 - 0.5 % Final   • Neutrophils, Absolute 01/23/2023 3.18  1.70 - 7.00 10*3/mm3 Final   • Lymphocytes, Absolute 01/23/2023 2.07  0.70 - 3.10 10*3/mm3 Final   • Monocytes, Absolute 01/23/2023 0.33  0.10 - 0.90 10*3/mm3 Final   • Eosinophils, Absolute 01/23/2023 0.05  0.00 - 0.40 10*3/mm3 Final   • Basophils, Absolute 01/23/2023 0.03  0.00 - 0.20 10*3/mm3 Final   • Immature Grans, Absolute 01/23/2023 0.01  0.00 - 0.05 10*3/mm3 Final   • nRBC 01/23/2023 0.0  0.0 - 0.2 /100 WBC Final         Assessment & Plan   Assessment     ICD-10-CM ICD-9-CM   1. Post traumatic stress disorder (PTSD)  F43.10 309.81   2. Moderate episode of recurrent major depressive disorder  F33.1 296.32         Patient educated on SSRI therapy being gold standard for treatment for PTSD.  Patient educated on FDA approved medications to treat PTSD. Patient informed no controlled substance such as Xanax or Klonopin will be written by this provider to treat PTSD.    Patient reported that she was recently on Lunesta, Gabapentin, Tramadol as well as Klonopin in place of Xanax.  These along with her multiple medical comorbidities would make her at high risk for overdose lethality.  Again a controlled substance will not be prescribed.  Patient was open minded to noncontrolled substance options to treat her PTSD symptoms.    PDMP reviewed and there had not been a recent RX for these medications. Patient states she has not seen her psychiatrist since July 2022 due to provider no longer accepting her insurance. Most recent controlled substance RXs were Lunesta in April 2023, Gabapentin November 2022.     Patient offered propranolol but did not feel comfortable taking it as she is on metoprolol.    Patient offered BuSpar and is willing to take this.  Once BuSpar is built up into her system, we will decrease and hopefully discontinue hydroxyzine.      Patient  reports positive response to BuSpar in the past.  Patient reports her previous psychiatrist discontinued BuSpar and Zoloft to place her on Xanax.    Start Zoloft 25 mg daily for 7 days then increase to 50 mg daily.    Continue Cymbalta 60 mg daily for now with a goal of titrating up on Zoloft and tapering off Cymbalta in the near future to decrease and prevent polypharmacy.    Continue prazosin 10 mg at bedtime.  Ambulatory referral for psychotherapy.    GOALS:  Short Term Goals: Patient will be compliant with medication, and patient will have no significant medication related side effects.  Patient will be engaged in psychotherapy as indicated.  Patient will report subjective improvement of symptoms.  Long term goals: To stabilize mood and treat/improve subjective symptoms, the patient will stay out of the hospital, the patient will be at an optimal level of functioning, and the patient will take all medications as prescribed.  The patient/guardian verbalized understanding and agreement with goals that were mutually set.      TREATMENT PLAN: Continue supportive psychotherapy efforts and medications as indicated.  Pharmacological and Non-Pharmacological treatment options discussed during today's visit. Patient/Guardian acknowledged and verbally consented with current treatment plan and was educated on the importance of compliance with treatment and follow-up appointments.      MEDICATION ISSUES:  Discussed medication options and treatment plan of prescribed medication as well as the risks, benefits, any black box warnings, and side effects including potential falls, possible impaired driving, and metabolic adversities among others. Patient is agreeable to call the office with any worsening of symptoms or onset of side effects, or if any concerns or questions arise.  The contact information for the office is made available to the patient. Patient is agreeable to call 911 or go to the nearest ER should they begin  having any SI/HI, or if any urgent concerns arise. No medication side effects or related complaints today.     Medication Changes During Visit:   Medications Discontinued During This Encounter   Medication Reason   • prazosin (MINIPRESS) 5 MG capsule Dose adjustment   • DULoxetine (CYMBALTA) 60 MG capsule Reorder      New Medications Ordered This Visit   Medications   • sertraline (Zoloft) 50 MG tablet     Sig: Take 0.5 tablets by mouth Daily for 7 days, THEN 1 tablet Daily for 23 days.     Dispense:  27 tablet     Refill:  0   • busPIRone (BUSPAR) 5 MG tablet     Sig: Take 1 tablet by mouth 3 (Three) Times a Day.     Dispense:  90 tablet     Refill:  0   • DULoxetine (CYMBALTA) 60 MG capsule     Sig: Take 1 capsule by mouth Daily.     Dispense:  30 capsule     Refill:  0   • prazosin (MINIPRESS) 5 MG capsule     Sig: Take 2 capsules by mouth Every Night for 30 days.     Dispense:  60 capsule     Refill:  0       Follow Up Appointment:   Return in about 4 weeks (around 6/21/2023) for Recheck.           This document has been electronically signed by TAWNY Reagan  May 24, 2023 10:56 EDT    Dictated Utilizing Dragon Dictation: Part of this note may be an electronic transcription/translation of spoken language to printed text using the Dragon Dictation System.

## 2023-05-24 NOTE — TELEPHONE ENCOUNTER
SPOKE TO THE PT WE DO NOT ORDER THE REFERRAL,SO WE CANT SEND IT ELSEWHERE, INFORMED THE PT SHE NEEDS TO CALL [facility] AND SEE IF THEY CAN FAX THE REFERRAL ELSEWHERE.

## 2023-05-24 NOTE — TELEPHONE ENCOUNTER
Please notify patient that I cannot refill her Tramadol as it is not indicated for migraine or seizure treatment, she may try contacting PCP to see if they are comfortable sending this medication, otherwise she may need to discuss with pain management.

## 2023-05-24 NOTE — TELEPHONE ENCOUNTER
Caller: Sigrid Bello    Relationship: Self    Best call back number: [phone]    What is the best time to reach you: ANYTIME TODAY AS SOON AS POSSIBLE    Who are you requesting to speak with (clinical staff, provider,  specific staff member): PCP/MA      What was the call regarding: PATIENT STATED SHE HAD A PAIN MANAGEMENT APPT TODAY AND THERE WAS A ISSUE WITH WHERE SHE WAS TOLD TO GO . WHEN SHE DID ARRIVE AT THE ACTUAL OFFICE SHE WAS LATE AND THEY WOULD NOT SEE HER. SHE WANTS A CALLBACK AND NEEDS TO GET IN TO SOMEWHERE TODAY    Do you require a callback: YES

## 2023-05-24 NOTE — TELEPHONE ENCOUNTER
PATIENT ARRIVED AT OUR OFFICE TODAY 45 MINS PAST HER ARRIVAL TIME, WAS INFORMED OF OUR LATE POLICY -15 MINS- AND WAS INFORMED SHE WOULD NEED TO BE RESCHEDULED DUE TO OUR FULL CLINIC.  PATIENT STARTED TO BE HATEFUL AND LOUD AT OUR CHECK IN [facility] AND CAUSING DISRUPTION AND HOSTILITY WITH THE LOBBY STAFF AND PATIENTS.   SHE STATES SHE WAS GOING TO FILE A COMPLAINT ON US AND LEFT.    UPON DISCUSSING THE SITUATION WITH DR. RIDER, HE STATES HE WILL NOT SEE THIS PATIENT AND SHE IS NOT TO BE RE-SCHEDULED.

## 2023-05-24 NOTE — TELEPHONE ENCOUNTER
"Telephone encounter from pain management today: \"PATIENT ARRIVED AT OUR OFFICE TODAY 45 MINS PAST HER ARRIVAL TIME, WAS INFORMED OF OUR LATE POLICY -15 MINS- AND WAS INFORMED SHE WOULD NEED TO BE RESCHEDULED DUE TO OUR FULL CLINIC.  PATIENT STARTED TO BE HATEFUL AND LOUD AT OUR CHECK IN [facility] AND CAUSING DISRUPTION AND HOSTILITY WITH THE LOBBY STAFF AND PATIENTS.   SHE STATES SHE WAS GOING TO FILE A COMPLAINT ON US AND LEFT.     UPON DISCUSSING THE SITUATION WITH DR. RIDER, HE STATES HE WILL NOT SEE THIS PATIENT AND SHE IS NOT TO BE RE-SCHEDULED.\"  "

## 2023-05-25 RX ORDER — ESZOPICLONE 3 MG/1
3 TABLET, FILM COATED ORAL DAILY
Qty: 30 TABLET | Refills: 2 | OUTPATIENT
Start: 2023-05-25

## 2023-05-25 NOTE — TELEPHONE ENCOUNTER
No answer when called so I left a voice mail for her to return call.    HUB TO READ:   Please notify patient that Chela cannot refill your Tramadol as it is not indicated for migraine or seizure treatment.  You may try contacting your PCP to see if they are comfortable sending this medication, otherwise you may need to discuss with pain management.

## 2023-05-25 NOTE — TELEPHONE ENCOUNTER
Rx Refill Note  Requested Prescriptions     Pending Prescriptions Disp Refills   • eszopiclone (LUNESTA) 3 MG tablet 30 tablet 2     Sig: Take 1 tablet by mouth Daily.      Last office visit with prescribing clinician: Visit date not found   Last telemedicine visit with prescribing clinician: 4/26/2023   Next office visit with prescribing clinician: Visit date not found                         Would you like a call back once the refill request has been completed: [] Yes [] No    If the office needs to give you a call back, can they leave a voicemail: [] Yes [] No    Agatha Baum LPN  05/25/23, 08:00 EDT

## 2023-05-26 ENCOUNTER — APPOINTMENT (OUTPATIENT)
Dept: OTHER | Facility: HOSPITAL | Age: 53
End: 2023-05-26
Payer: COMMERCIAL

## 2023-05-26 ENCOUNTER — TELEPHONE (OUTPATIENT)
Dept: NEUROLOGY | Facility: CLINIC | Age: 53
End: 2023-05-26

## 2023-05-26 ENCOUNTER — TELEPHONE (OUTPATIENT)
Dept: PSYCHIATRY | Facility: CLINIC | Age: 53
End: 2023-05-26
Payer: COMMERCIAL

## 2023-05-26 ENCOUNTER — LAB (OUTPATIENT)
Dept: LAB | Facility: HOSPITAL | Age: 53
End: 2023-05-26
Payer: COMMERCIAL

## 2023-05-26 ENCOUNTER — HOSPITAL ENCOUNTER (OUTPATIENT)
Dept: MAMMOGRAPHY | Facility: HOSPITAL | Age: 53
Discharge: HOME | End: 2023-05-26
Payer: COMMERCIAL

## 2023-05-26 DIAGNOSIS — Z92.89 HX OF MAMMOGRAM: ICD-10-CM

## 2023-05-26 DIAGNOSIS — Z12.31 ENCOUNTER FOR SCREENING MAMMOGRAM FOR MALIGNANT NEOPLASM OF BREAST: ICD-10-CM

## 2023-05-26 LAB
BASOPHILS # BLD AUTO: 0.03 10*3/MM3 (ref 0–0.2)
BASOPHILS NFR BLD AUTO: 0.3 % (ref 0–1.5)
DEPRECATED RDW RBC AUTO: 44.1 FL (ref 37–54)
EOSINOPHIL # BLD AUTO: 0.06 10*3/MM3 (ref 0–0.4)
EOSINOPHIL NFR BLD AUTO: 0.6 % (ref 0.3–6.2)
ERYTHROCYTE [DISTWIDTH] IN BLOOD BY AUTOMATED COUNT: 14.6 % (ref 12.3–15.4)
HCT VFR BLD AUTO: 38.3 % (ref 34–46.6)
HGB BLD-MCNC: 12.8 G/DL (ref 12–15.9)
IMM GRANULOCYTES # BLD AUTO: 0.09 10*3/MM3 (ref 0–0.05)
IMM GRANULOCYTES NFR BLD AUTO: 0.9 % (ref 0–0.5)
LYMPHOCYTES # BLD AUTO: 3.61 10*3/MM3 (ref 0.7–3.1)
LYMPHOCYTES NFR BLD AUTO: 36.6 % (ref 19.6–45.3)
MCH RBC QN AUTO: 28 PG (ref 26.6–33)
MCHC RBC AUTO-ENTMCNC: 33.4 G/DL (ref 31.5–35.7)
MCV RBC AUTO: 83.8 FL (ref 79–97)
MONOCYTES # BLD AUTO: 0.71 10*3/MM3 (ref 0.1–0.9)
MONOCYTES NFR BLD AUTO: 7.2 % (ref 5–12)
NEUTROPHILS NFR BLD AUTO: 5.37 10*3/MM3 (ref 1.7–7)
NEUTROPHILS NFR BLD AUTO: 54.4 % (ref 42.7–76)
NRBC BLD AUTO-RTO: 0.1 /100 WBC (ref 0–0.2)
PLATELET # BLD AUTO: 236 10*3/MM3 (ref 140–450)
PMV BLD AUTO: 9.3 FL (ref 6–12)
RBC # BLD AUTO: 4.57 10*6/MM3 (ref 3.77–5.28)
WBC NRBC COR # BLD: 9.87 10*3/MM3 (ref 3.4–10.8)

## 2023-05-26 PROCEDURE — 77067 SCR MAMMO BI INCL CAD: CPT

## 2023-05-26 PROCEDURE — 85025 COMPLETE CBC W/AUTO DIFF WBC: CPT | Performed by: INTERNAL MEDICINE

## 2023-05-26 PROCEDURE — 77063 BREAST TOMOSYNTHESIS BI: CPT

## 2023-05-26 NOTE — TELEPHONE ENCOUNTER
Pt requested Lunesta to be refilled. Pt's pcp prescribed Lunesta. Advised pt to contact pcp office to get refill until she can speak with provider at next appt about taking over management of Lunesta.

## 2023-05-26 NOTE — TELEPHONE ENCOUNTER
Caller: Sigrid Bello    Relationship: Self    Best call back number:[phone]    What is the best time to reach you: ANY    Who are you requesting to speak with (clinical staff, provider,  specific staff member): STAFF    Do you know the name of the person who called: COURTNEY    What was the call regarding: PATIENT IS RETURNING COURTNEY'S CALL BACK. SHE ADVISES IF THIS IS RE: THE RX FOR TREMEDOL, SHE HAS BEEN ON THIS FOR YEARS (& IT SHOULD BE FOUND IN HER CHART).    SHE IS OUT OF IT & CAN NOT GET INTO PAIN MANAGEMENT UNTIL June.     Do you require a callback: YES PLEASE CALL & ADVISE    THANK YOU

## 2023-05-30 ENCOUNTER — PATIENT MESSAGE (OUTPATIENT)
Dept: INTERNAL MEDICINE | Facility: CLINIC | Age: 53
End: 2023-05-30

## 2023-05-30 ENCOUNTER — TELEPHONE (OUTPATIENT)
Dept: INTERNAL MEDICINE | Facility: CLINIC | Age: 53
End: 2023-05-30

## 2023-05-30 RX ORDER — PROCHLORPERAZINE 25 MG/1
1 SUPPOSITORY RECTAL SEE ADMIN INSTRUCTIONS
Qty: 1 EACH | Refills: 0 | Status: SHIPPED | OUTPATIENT
Start: 2023-05-30

## 2023-05-30 RX ORDER — INSULIN LISPRO 100 [IU]/ML
INJECTION, SOLUTION INTRAVENOUS; SUBCUTANEOUS
OUTPATIENT
Start: 2023-05-30

## 2023-05-30 RX ORDER — INSULIN GLARGINE 100 [IU]/ML
10 INJECTION, SOLUTION SUBCUTANEOUS 2 TIMES DAILY
Qty: 3 ML | Refills: 5 | Status: SHIPPED | OUTPATIENT
Start: 2023-05-30

## 2023-05-30 NOTE — TELEPHONE ENCOUNTER
Medication:   Requested Prescriptions     Pending Prescriptions Disp Refills    clotrimazole (LOTRIMIN AF) 1 % cream 60 g 1     Sig: Apply topically 2 times daily. Last Filled:      Patient Phone Number: [phone] (home)     Last appt: 4/8/2022   Next appt: Visit date not found    Last OARRS: No flowsheet data found. Spoke to patient. Informed that Chela suggested she call her PCP for refill of Tramadol.  Patient stated that this Rx is used to treat her Dystonia and not migraine or sz diagnosis.

## 2023-05-30 NOTE — TELEPHONE ENCOUNTER
Caller: Sigrid Bello    Relationship: Self    Best call back number: [phone]    What medication are you requesting: DEXCOM G7 DEVICE AND SENSORS    LANTIS 10 UNITS TWICE DAILY- ONE AM AND PM AND HumaLOG KwikPen 100 UNIT/ML solution pen-injector     What are your current symptoms: BLOOD SUGAR    Have you had these symptoms before:    [x] Yes  [] No    Have you been treated for these symptoms before:   [x] Yes  [] No    If a prescription is needed, what is your preferred pharmacy and phone number:    [facility] [place], [place] - [address] [facility] [phone] [facility] [phone]      Additional notes: PATIENT HAD THESE DONE FROM HOWARD SANCHEZ. THEN THE ENDOCRONOLOGIST NEEDED TO DO THIS. SHE CANNOT GET INTO TO THEM FOR A WHILE. SHE CANNOT DO THE FREESTYLE DEVICE. PLEASE LET HER KNOW IF THIS CAN BE DONE. SHE IS ALMOST OUT OF THE INSULIN.

## 2023-05-30 NOTE — TELEPHONE ENCOUNTER
From: Sigrid Bello  To: Belen Mercer  Sent: 5/30/2023 2:43 PM EDT  Subject: Cgs    I need my Dexcom sensor and transmitter refilled, but I was wondering about changing over to the G7. Can we do that? Can you refill this?

## 2023-05-31 ENCOUNTER — TELEPHONE (OUTPATIENT)
Dept: INTERNAL MEDICINE | Facility: CLINIC | Age: 53
End: 2023-05-31

## 2023-05-31 RX ORDER — PROCHLORPERAZINE 25 MG/1
SUPPOSITORY RECTAL
Qty: 3 EACH | Refills: 3 | Status: SHIPPED | OUTPATIENT
Start: 2023-05-31

## 2023-05-31 NOTE — TELEPHONE ENCOUNTER
There is already a Emergent[employer] message to you about dexcom. Lantus was sent to her pharmacy yesterday but the humalog sliding scale was denied. She cant get in to see her endo until September and is almost out of insulin.

## 2023-05-31 NOTE — TELEPHONE ENCOUNTER
Pt called and stated that she needs the sensors for the dexcom g6 . Pt stated that she can not see her endocrinologist until September. Please send the sensors to [facility].

## 2023-06-01 NOTE — TELEPHONE ENCOUNTER
PATIENT IS CALLING BACK AS THE [facility] IS NOW CLOSED FOR GOOD SHE STATES. PLEASE SEND ALL THE MEDICATIONS REQUESTED TO:    [facility] [address] [place] [address] [address] [facility] [phone] [facility] [phone] FX

## 2023-06-03 DIAGNOSIS — R56.9 GENERALIZED CONVULSIVE SEIZURES: ICD-10-CM

## 2023-06-05 ENCOUNTER — HOSPITAL ENCOUNTER (OUTPATIENT)
Dept: CARDIOLOGY | Facility: HOSPITAL | Age: 53
Discharge: HOME | End: 2023-06-05
Admitting: INTERNAL MEDICINE
Payer: COMMERCIAL

## 2023-06-05 VITALS
DIASTOLIC BLOOD PRESSURE: 84 MMHG | SYSTOLIC BLOOD PRESSURE: 133 MMHG | BODY MASS INDEX: 30.7 KG/M2 | HEIGHT: 66 IN | WEIGHT: 191 LBS

## 2023-06-05 DIAGNOSIS — I34.0 NON-RHEUMATIC MITRAL REGURGITATION: ICD-10-CM

## 2023-06-05 DIAGNOSIS — I34.1 MITRAL VALVE PROLAPSE: ICD-10-CM

## 2023-06-05 DIAGNOSIS — I49.3 PVC'S (PREMATURE VENTRICULAR CONTRACTIONS): ICD-10-CM

## 2023-06-05 LAB
ASCENDING AORTA: 3.1 CM
BH CV ECHO MEAS - AO MAX PG: 4.9 MMHG
BH CV ECHO MEAS - AO MEAN PG: 3.2 MMHG
BH CV ECHO MEAS - AO ROOT DIAM: 3.1 CM
BH CV ECHO MEAS - AO V2 MAX: 111.2 CM/SEC
BH CV ECHO MEAS - AO V2 VTI: 25.4 CM
BH CV ECHO MEAS - AVA(I,D): 2.6 CM2
BH CV ECHO MEAS - EDV(CUBED): 99.1 ML
BH CV ECHO MEAS - EDV(MOD-SP2): 116 ML
BH CV ECHO MEAS - EDV(MOD-SP4): 97.4 ML
BH CV ECHO MEAS - EF(MOD-BP): 55.2 %
BH CV ECHO MEAS - EF(MOD-SP2): 58.7 %
BH CV ECHO MEAS - EF(MOD-SP4): 53 %
BH CV ECHO MEAS - ESV(CUBED): 37.6 ML
BH CV ECHO MEAS - ESV(MOD-SP2): 47.9 ML
BH CV ECHO MEAS - ESV(MOD-SP4): 45.8 ML
BH CV ECHO MEAS - FS: 27.6 %
BH CV ECHO MEAS - IVS/LVPW: 1.08 CM
BH CV ECHO MEAS - IVSD: 0.95 CM
BH CV ECHO MEAS - LA DIMENSION: 2.9 CM
BH CV ECHO MEAS - LAT PEAK E' VEL: 9.6 CM/SEC
BH CV ECHO MEAS - LV MASS(C)D: 141.7 GRAMS
BH CV ECHO MEAS - LV MAX PG: 3.3 MMHG
BH CV ECHO MEAS - LV MEAN PG: 1.79 MMHG
BH CV ECHO MEAS - LV V1 MAX: 90.4 CM/SEC
BH CV ECHO MEAS - LV V1 VTI: 20 CM
BH CV ECHO MEAS - LVIDD: 4.6 CM
BH CV ECHO MEAS - LVIDS: 3.3 CM
BH CV ECHO MEAS - LVOT AREA: 3.3 CM2
BH CV ECHO MEAS - LVOT DIAM: 2.05 CM
BH CV ECHO MEAS - LVPWD: 0.88 CM
BH CV ECHO MEAS - MED PEAK E' VEL: 7.8 CM/SEC
BH CV ECHO MEAS - MV A MAX VEL: 72.4 CM/SEC
BH CV ECHO MEAS - MV DEC SLOPE: 323.5 CM/SEC2
BH CV ECHO MEAS - MV DEC TIME: 0.2 MSEC
BH CV ECHO MEAS - MV E MAX VEL: 70.3 CM/SEC
BH CV ECHO MEAS - MV E/A: 0.97
BH CV ECHO MEAS - MV MAX PG: 2.45 MMHG
BH CV ECHO MEAS - MV MEAN PG: 1.59 MMHG
BH CV ECHO MEAS - MV P1/2T: 68.8 MSEC
BH CV ECHO MEAS - MV V2 VTI: 19.7 CM
BH CV ECHO MEAS - MVA(P1/2T): 3.2 CM2
BH CV ECHO MEAS - MVA(VTI): 3.4 CM2
BH CV ECHO MEAS - PA ACC TIME: 0.14 SEC
BH CV ECHO MEAS - PA PR(ACCEL): 17.2 MMHG
BH CV ECHO MEAS - RAP SYSTOLE: 3 MMHG
BH CV ECHO MEAS - RVSP: 6 MMHG
BH CV ECHO MEAS - SV(LVOT): 66.1 ML
BH CV ECHO MEAS - SV(MOD-SP2): 68.1 ML
BH CV ECHO MEAS - SV(MOD-SP4): 51.6 ML
BH CV ECHO MEAS - TAPSE (>1.6): 2.39 CM
BH CV ECHO MEAS - TR MAX PG: 2.7 MMHG
BH CV ECHO MEAS - TR MAX VEL: 82.8 CM/SEC
BH CV ECHO MEASUREMENTS AVERAGE E/E' RATIO: 8.08
BH CV XLRA - RV BASE: 3.3 CM
BH CV XLRA - RV LENGTH: 7.5 CM
BH CV XLRA - RV MID: 2.6 CM
BH CV XLRA - TDI S': 13.3 CM/SEC
LEFT ATRIUM VOLUME INDEX: 16.6 ML/M2

## 2023-06-05 PROCEDURE — 93306 TTE W/DOPPLER COMPLETE: CPT | Performed by: INTERNAL MEDICINE

## 2023-06-05 PROCEDURE — 93306 TTE W/DOPPLER COMPLETE: CPT

## 2023-06-05 RX ORDER — LEVETIRACETAM 500 MG/1
500 TABLET ORAL 2 TIMES DAILY
Qty: 60 TABLET | Refills: 2 | Status: SHIPPED | OUTPATIENT
Start: 2023-06-05 | End: 2023-09-03

## 2023-06-05 NOTE — TELEPHONE ENCOUNTER
Rx Refill Note  Requested Prescriptions     Pending Prescriptions Disp Refills    levETIRAcetam (KEPPRA) 500 MG tablet 60 tablet 2     Sig: Take 1 tablet by mouth 2 (Two) Times a Day for 90 days.      Last filled:05/04/2023 with 2 refills  Last office visit with prescribing clinician: 5/4/2023      Next office visit with prescribing clinician: 6/27/2023     WOJCIECH MENDEZ  06/05/23, 15:41 EDT    Patient wanting sent to a new pharmacy.

## 2023-06-07 ENCOUNTER — TELEPHONE (OUTPATIENT)
Dept: CARDIOLOGY | Facility: CLINIC | Age: 53
End: 2023-06-07
Payer: COMMERCIAL

## 2023-06-07 NOTE — TELEPHONE ENCOUNTER
----- Message from Ferny Collado MD sent at 6/7/2023 10:19 AM EDT -----  Echo looks good, stable.  No significant abnormalities.  Mild bowing of the mitral valve  ----- Message -----  From: Ferny Collado MD  Sent: 6/5/2023   9:12 PM EDT  To: Ferny Collado MD

## 2023-06-09 NOTE — TELEPHONE ENCOUNTER
From: Sigrid Bello  To: Belen Mercer  Sent: 5/22/2023 5:24 PM EDT  Subject: Home colon cancer screening     Is it possible to do a at home colon cancer screening? I know at my age they are recommended.

## 2023-06-13 ENCOUNTER — TELEPHONE (OUTPATIENT)
Dept: INTERNAL MEDICINE | Facility: CLINIC | Age: 53
End: 2023-06-13
Payer: COMMERCIAL

## 2023-06-13 ENCOUNTER — TELEPHONE (OUTPATIENT)
Dept: NEUROLOGY | Facility: CLINIC | Age: 53
End: 2023-06-13
Payer: COMMERCIAL

## 2023-06-13 NOTE — TELEPHONE ENCOUNTER
Caller: Sigrid Bello    Relationship: Self    Best call back number: [phone]     What form or medical record are you requesting: MEDICAL CLEARANCE    Who is requesting this form or medical record from you: [facility]    How would you like to receive the form or medical records (pick-up, mail, fax): FAX  If fax, what is the fax number: [phone]      Timeframe paperwork needed: AS SOON AS POSSIBLE    Additional notes: PATIENT NEEDS MEDICAL CLEARANCE FOR HYSTERECTOMY

## 2023-06-14 ENCOUNTER — TELEPHONE (OUTPATIENT)
Dept: INTERNAL MEDICINE | Facility: CLINIC | Age: 53
End: 2023-06-14

## 2023-06-14 NOTE — TELEPHONE ENCOUNTER
Provider: KHAI ANTHONY  Caller: PATIENT  Relationship to Patient: SELF  Phone Number: [phone]  Reason for Call: PATIENT STATES THE END ON HER FMLA PAPERWORK CAN'T BE LISTED AS UNDETERMINED. SHE STATES THEY WOULD LIKE US TO CROSS THAT OUT AND JUST PUT AND END DATE FOR A YEAR OUT & FAX IT BACK.

## 2023-06-14 NOTE — TELEPHONE ENCOUNTER
DELETE AFTER REVIEWING: Telephone encounter to be sent to the clinical pool     Caller: Sigrid Bello    Relationship: Self    Best call back number: [phone]     What medication are you requesting:  SOMETHING FOR PAIN    What are your current symptoms: BACK AND LOWER STOMACH    How long have you been experiencing symptoms: SEVERAL MONTHS    Have you had these symptoms before:   [x] Yes  [] No    Have you been treated for these symptoms before:  [x] Yes  [] No    If a prescription is needed, what is your preferred pharmacy and phone number: [id number] - [place], [place] - [address]  - [phone]  - [phone] FX     Additional notes:  PATIENT STATES SHE NEEDS SOMETHING FOR PAIN.  PATIENT FEELS THAT NO ONE HAS TRIED TO HELP HER.

## 2023-06-16 ENCOUNTER — OFFICE VISIT (OUTPATIENT)
Dept: INTERNAL MEDICINE | Facility: CLINIC | Age: 53
End: 2023-06-16
Payer: COMMERCIAL

## 2023-06-16 VITALS
HEART RATE: 77 BPM | HEIGHT: 66 IN | DIASTOLIC BLOOD PRESSURE: 68 MMHG | BODY MASS INDEX: 30.05 KG/M2 | OXYGEN SATURATION: 97 % | WEIGHT: 187 LBS | TEMPERATURE: 96.9 F | SYSTOLIC BLOOD PRESSURE: 128 MMHG

## 2023-06-16 DIAGNOSIS — Z01.818 PRE-OPERATIVE CLEARANCE: Primary | ICD-10-CM

## 2023-06-16 PROCEDURE — 99213 OFFICE O/P EST LOW 20 MIN: CPT | Performed by: INTERNAL MEDICINE

## 2023-06-16 PROCEDURE — 3044F HG A1C LEVEL LT 7.0%: CPT | Performed by: INTERNAL MEDICINE

## 2023-06-16 RX ORDER — CLINDAMYCIN HYDROCHLORIDE 300 MG/1
CAPSULE ORAL
COMMUNITY
Start: 2023-06-12

## 2023-06-16 NOTE — PROGRESS NOTES
Office Note      Date: 2023  Patient Name: Sigrid Bello  MRN: [id number]  : 1970    Chief Complaint   Patient presents with    Discuss paperwork       History of Present Illness: Sigrdi Bello is a 52 y.o. female who presents for Discuss paperwork. Needs clearance for [facility]. Had recent ECHO wnl.   Labs done last month. Can vacuum a room w/o difficulty. No SOB or CP. Hx of ATUL s/p sx. Chronic med issues stable. Always fatigued. EKG from 23 reviewed. No hx of surgical complications. DM well controlled.     Subjective      Review of Systems:   Pertinent review of systems per HPI.    Review of Systems   Constitutional:  Negative for activity change, appetite change, chills, diaphoresis, fatigue, fever and unexpected weight change.   HENT:  Negative for congestion, dental problem, drooling, ear discharge, ear pain, facial swelling, hearing loss and mouth sores.    Eyes:  Negative for pain, discharge and itching.   Respiratory:  Negative for apnea, cough, choking, chest tightness and shortness of breath.    Cardiovascular:  Negative for chest pain, palpitations and leg swelling.   Gastrointestinal:  Negative for abdominal distention, abdominal pain, blood in stool, constipation and diarrhea.   Endocrine: Negative for cold intolerance, heat intolerance, polydipsia and polyuria.   Genitourinary:  Negative for difficulty urinating, dysuria, frequency and hematuria.   Skin:  Negative for color change, pallor, rash and wound.   Allergic/Immunologic: Negative for environmental allergies, food allergies and immunocompromised state.   Neurological:  Negative for dizziness, weakness and light-headedness.   Psychiatric/Behavioral:  Negative for agitation, behavioral problems, confusion, decreased concentration and self-injury. The patient is not nervous/anxious.    All other systems reviewed and are negative.  Allergies   Allergen Reactions    Meperidine Anaphylaxis and Unknown (See Comments)     "Morphine Shortness Of Breath and Unknown (See Comments)    Mushroom Anaphylaxis    Peanut Oil Anaphylaxis    Penicillins Unknown (See Comments) and Anaphylaxis     Childhood reaction    Clindamycin Hives    Iodine Hives    Tree Nut Unknown - High Severity    Cortisone Rash and Unknown (See Comments)    Other Other (See Comments)       Objective     Physical Exam:  Vital Signs:   Vitals:    06/16/23 1435   BP: 128/68   Pulse: 77   Temp: 96.9 °F (36.1 °C)   SpO2: 97%   Weight: 84.8 kg (187 lb)   Height: 167.6 cm (66\")   PainSc: 0-No pain      Body mass index is 30.18 kg/m².    Physical Exam  Vitals and nursing note reviewed.   Constitutional:       General: She is not in acute distress.     Appearance: She is well-developed.   HENT:      Head: Normocephalic and atraumatic.      Right Ear: External ear normal.      Left Ear: External ear normal.   Cardiovascular:      Rate and Rhythm: Normal rate and regular rhythm.      Heart sounds: Normal heart sounds. No murmur heard.    No friction rub. No gallop.   Pulmonary:      Effort: Pulmonary effort is normal. No respiratory distress.      Breath sounds: Normal breath sounds. No wheezing or rales.   Skin:     General: Skin is warm and dry.      Coloration: Skin is not pale.       Assessment / Plan      Assessment & Plan:    1. Pre-operative clearance  May labs wnl, chronic med issues stable. Pt is low risk for sx.       Belen Mercer MD  06/16/2023 Answers submitted by the patient for this visit:  Other (Submitted on 6/15/2023)    "

## 2023-07-10 PROBLEM — N92.4 MENORRHAGIA, PREMENOPAUSAL: Status: ACTIVE | Noted: 2023-07-10

## 2023-07-11 ENCOUNTER — TELEPHONE (OUTPATIENT)
Dept: NEUROLOGY | Facility: CLINIC | Age: 53
End: 2023-07-11

## 2023-07-11 NOTE — TELEPHONE ENCOUNTER
}    Medication requested (name and dose):      KEPPRA 500 MG TABLET  TAKE 1 TABLET BY MOUTH 2X DAY    Pharmacy where request should be sent:      [address] [phone] [facility] [phone] FX     Additional details provided by patient:      Best call back number:  [phone]    Does the patient have less than a 3 day supply:  [x] Yes  [] No    Alba Florian Rep  07/11/23, 16:19 EDT 05}

## 2023-07-12 ENCOUNTER — TELEPHONE (OUTPATIENT)
Dept: INTERNAL MEDICINE | Facility: CLINIC | Age: 53
End: 2023-07-12

## 2023-07-12 DIAGNOSIS — F51.01 PRIMARY INSOMNIA: Primary | ICD-10-CM

## 2023-07-12 DIAGNOSIS — Z78.9 NEED FOR FOLLOW-UP BY SOCIAL WORKER: Primary | ICD-10-CM

## 2023-07-12 NOTE — TELEPHONE ENCOUNTER
I spoke with patient, she called the office that is doing her hysterectomy and they told her to call PCP to get home health order. I did advise patient that I was pretty sure that home health doesn't come to clean and help move but she wanted me to send this message to provider anyways.

## 2023-07-12 NOTE — TELEPHONE ENCOUNTER
Caller: Sigrid Bello    Relationship: Self    Best call back number: [phone]     What orders are you requesting (i.e. lab or imaging): HOME HEALTH CARE     In what timeframe would the patient need to come in: 23-23    Where will you receive your lab/imaging services: HOME     Additional notes: PATIENT STATES SHE IS SCHEDULED TO HAVE A HYSTERECTOMY ON 23 AND HER SURGEON BELIEVES HE WILL NEED TO PERFORM THE SURGERY WITH A  INCISION. PATIENT STATES DURING THE TWO WEEKS SHE WILL NEED TO RECOVER FROM HER SURGERY NOBODY WILL BE HOME WITH HER AND SHE WOULD LIKE TO REQUEST HOME HEALTH TO ASSIST HER WITH HOUSE WORK AND HELPING HER MOVE. PATIENT STATES SHE IS NERVOUS TO BE ALONE AFTER THE SURGERY AND WOULD BE MUCH MORE COMFORTABLE KNOWING SOMEONE IS WITH HER.

## 2023-07-12 NOTE — TELEPHONE ENCOUNTER
Caller: EugeniaSigrid    Relationship: Self    Best call back number: [phone]     Requested Prescriptions:   ESZOPICLONE (LUNESTA) 3 MG TABLET, 1 TABLET AT NIGHT     Pharmacy where request should be sent: [facility] [address] [place] [address] [phone] [facility] [phone]      Last office visit with prescribing clinician: Visit date not found   Last telemedicine visit with prescribing clinician: Visit date not found   Next office visit with prescribing clinician: 8/14/2023     Additional details provided by patient: OUT OF MEDICATION. PATIENT STATES HER PREVIOUS PSYCHIATRIST HAD PLACED HER ON ESZOPICLONE (LUNESTA) 3 MG TABLET, 1 TABLET AT NIGHT. PATIENT STATES SHE IS NO LONGER ABLE TO SEE HER PSYCHIATRIST BECAUSE SHE DOES NOT TAKE PATIENT'S INSURANCE. PATIENT WOULD LIKE TO REQUEST THAT PCP BEGIN PRESCRIBING MEDICATION GOING FORWARD. PATIENT STATES WITHOUT MEDICATION SHE IS GETTING VERY LITTLE REST. PATIENT STATES SHE IS GETTING BETWEEN AN HOUR TO HOUR AND A HALF OF SLEEP AT NIGHT, IF THAT.     Does the patient have less than a 3 day supply:  [x] Yes  [] No    Would you like a call back once the refill request has been completed: [] Yes [x] No    If the office needs to give you a call back, can they leave a voicemail: [] Yes [x] No    Alba Sauceda   07/12/23 12:24 EDT

## 2023-07-12 NOTE — TELEPHONE ENCOUNTER
Tried calling patient but unable to leave voicemail so sent [address] message asking if patient has tried contacting [facility] for a refill since this was filled in June w/2 refills

## 2023-07-12 NOTE — TELEPHONE ENCOUNTER
Rx Refill Note  Requested Prescriptions      No prescriptions requested or ordered in this encounter      Last filled:  Last office visit with prescribing clinician: 5/4/2023      Next office visit with prescribing clinician: 10/17/2023     WOJCIECH MENDEZ  07/12/23, 08:13 EDT

## 2023-07-12 NOTE — TELEPHONE ENCOUNTER
I do not manage this prescription, I will place a referral to psychiatry, she is to get refills from prior psychiatrist until she can get in with new psychiatrist.

## 2023-07-13 NOTE — TELEPHONE ENCOUNTER
Pt notified. She is wondering if there is anything that you could send in to help with sleep as she can not afford $200 to see her psychiatrist.

## 2023-07-14 NOTE — TELEPHONE ENCOUNTER
Spoke with patient. Tried to get her in sooner than 8/14 with corina. Pt decided to keep original appt

## 2023-07-14 NOTE — TELEPHONE ENCOUNTER
Have her make an office visit to discuss   Pt reported having bowel movement after enema and feeling relief. PA notified.

## 2023-07-20 ENCOUNTER — HOSPITAL ENCOUNTER (OUTPATIENT)
Facility: HOSPITAL | Age: 53
Discharge: HOME | End: 2023-07-21
Attending: OBSTETRICS & GYNECOLOGY | Admitting: OBSTETRICS & GYNECOLOGY
Payer: COMMERCIAL

## 2023-07-20 DIAGNOSIS — N92.4 EXCESSIVE BLEEDING IN PREMENOPAUSAL PERIOD: ICD-10-CM

## 2023-07-20 DIAGNOSIS — D21.9 FIBROID: ICD-10-CM

## 2023-07-20 DIAGNOSIS — N92.4 MENORRHAGIA, PREMENOPAUSAL: Primary | ICD-10-CM

## 2023-07-20 PROBLEM — Z90.710 S/P ABDOMINAL HYSTERECTOMY: Status: ACTIVE | Noted: 2023-07-20

## 2023-07-20 PROBLEM — S31.41XA: Status: ACTIVE | Noted: 2023-07-20

## 2023-07-20 LAB
B-HCG UR QL: NEGATIVE
EXPIRATION DATE: NORMAL
GLUCOSE BLDC GLUCOMTR-MCNC: 217 MG/DL (ref 70–130)
GLUCOSE BLDC GLUCOMTR-MCNC: 94 MG/DL (ref 70–130)
INTERNAL NEGATIVE CONTROL: NORMAL
INTERNAL POSITIVE CONTROL: NORMAL
Lab: NORMAL

## 2023-07-20 PROCEDURE — 25010000002 VANCOMYCIN PER 500 MG: Performed by: OBSTETRICS & GYNECOLOGY

## 2023-07-20 PROCEDURE — 25010000002 HYDROMORPHONE 1 MG/ML SOLUTION

## 2023-07-20 PROCEDURE — 25010000002 HEPARIN (PORCINE) PER 1000 UNITS: Performed by: OBSTETRICS & GYNECOLOGY

## 2023-07-20 PROCEDURE — 81025 URINE PREGNANCY TEST: CPT | Performed by: OBSTETRICS & GYNECOLOGY

## 2023-07-20 PROCEDURE — 88305 TISSUE EXAM BY PATHOLOGIST: CPT | Performed by: OBSTETRICS & GYNECOLOGY

## 2023-07-20 PROCEDURE — 63710000001 INSULIN LISPRO (HUMAN) PER 5 UNITS: Performed by: NURSE PRACTITIONER

## 2023-07-20 PROCEDURE — 82948 REAGENT STRIP/BLOOD GLUCOSE: CPT

## 2023-07-20 RX ORDER — FAMOTIDINE 10 MG/ML
20 INJECTION, SOLUTION INTRAVENOUS ONCE
Status: CANCELLED | OUTPATIENT
Start: 2023-07-20 | End: 2023-07-20

## 2023-07-20 RX ORDER — MELOXICAM 7.5 MG/1
7.5 TABLET ORAL DAILY
Qty: 20 TABLET | Refills: 0 | Status: SHIPPED | OUTPATIENT
Start: 2023-07-20

## 2023-07-20 RX ORDER — ACETAMINOPHEN 500 MG
1000 TABLET ORAL ONCE
Status: COMPLETED | OUTPATIENT
Start: 2023-07-20 | End: 2023-07-20

## 2023-07-20 RX ORDER — PROMETHAZINE HYDROCHLORIDE 25 MG/1
25 SUPPOSITORY RECTAL ONCE AS NEEDED
Status: DISCONTINUED | OUTPATIENT
Start: 2023-07-20 | End: 2023-07-20 | Stop reason: HOSPADM

## 2023-07-20 RX ORDER — GABAPENTIN 400 MG/1
400 CAPSULE ORAL 3 TIMES DAILY
Status: DISCONTINUED | OUTPATIENT
Start: 2023-07-20 | End: 2023-07-21 | Stop reason: HOSPADM

## 2023-07-20 RX ORDER — BUPIVACAINE HYDROCHLORIDE AND EPINEPHRINE 5; 5 MG/ML; UG/ML
INJECTION, SOLUTION PERINEURAL AS NEEDED
Status: DISCONTINUED | OUTPATIENT
Start: 2023-07-20 | End: 2023-07-20 | Stop reason: HOSPADM

## 2023-07-20 RX ORDER — ALBUTEROL SULFATE 2.5 MG/3ML
2.5 SOLUTION RESPIRATORY (INHALATION) EVERY 4 HOURS PRN
Status: DISCONTINUED | OUTPATIENT
Start: 2023-07-20 | End: 2023-07-21 | Stop reason: HOSPADM

## 2023-07-20 RX ORDER — OXYCODONE HYDROCHLORIDE 5 MG/1
5 TABLET ORAL EVERY 4 HOURS PRN
Status: DISCONTINUED | OUTPATIENT
Start: 2023-07-20 | End: 2023-07-21 | Stop reason: HOSPADM

## 2023-07-20 RX ORDER — NALOXONE HCL 0.4 MG/ML
0.1 VIAL (ML) INJECTION
Status: DISCONTINUED | OUTPATIENT
Start: 2023-07-20 | End: 2023-07-21 | Stop reason: HOSPADM

## 2023-07-20 RX ORDER — SODIUM CHLORIDE 9 MG/ML
40 INJECTION, SOLUTION INTRAVENOUS AS NEEDED
Status: CANCELLED | OUTPATIENT
Start: 2023-07-20

## 2023-07-20 RX ORDER — VANCOMYCIN HYDROCHLORIDE 1 G/200ML
1000 INJECTION, SOLUTION INTRAVENOUS ONCE
Status: COMPLETED | OUTPATIENT
Start: 2023-07-20 | End: 2023-07-20

## 2023-07-20 RX ORDER — SIMETHICONE 80 MG
80 TABLET,CHEWABLE ORAL 4 TIMES DAILY PRN
Status: DISCONTINUED | OUTPATIENT
Start: 2023-07-20 | End: 2023-07-21 | Stop reason: HOSPADM

## 2023-07-20 RX ORDER — SODIUM CHLORIDE, SODIUM LACTATE, POTASSIUM CHLORIDE, CALCIUM CHLORIDE 600; 310; 30; 20 MG/100ML; MG/100ML; MG/100ML; MG/100ML
9 INJECTION, SOLUTION INTRAVENOUS CONTINUOUS
Status: DISCONTINUED | OUTPATIENT
Start: 2023-07-20 | End: 2023-07-21 | Stop reason: HOSPADM

## 2023-07-20 RX ORDER — ACETAMINOPHEN 325 MG/1
650 TABLET ORAL ONCE
Status: DISCONTINUED | OUTPATIENT
Start: 2023-07-20 | End: 2023-07-20 | Stop reason: SDUPTHER

## 2023-07-20 RX ORDER — BUSPIRONE HYDROCHLORIDE 10 MG/1
5 TABLET ORAL 3 TIMES DAILY
Status: DISCONTINUED | OUTPATIENT
Start: 2023-07-20 | End: 2023-07-21 | Stop reason: HOSPADM

## 2023-07-20 RX ORDER — LIDOCAINE HYDROCHLORIDE 10 MG/ML
0.5 INJECTION, SOLUTION EPIDURAL; INFILTRATION; INTRACAUDAL; PERINEURAL ONCE AS NEEDED
Status: COMPLETED | OUTPATIENT
Start: 2023-07-20 | End: 2023-07-20

## 2023-07-20 RX ORDER — HYDROCODONE BITARTRATE AND ACETAMINOPHEN 5; 325 MG/1; MG/1
1 TABLET ORAL ONCE AS NEEDED
Status: DISCONTINUED | OUTPATIENT
Start: 2023-07-20 | End: 2023-07-20 | Stop reason: HOSPADM

## 2023-07-20 RX ORDER — FENTANYL CITRATE 50 UG/ML
50 INJECTION, SOLUTION INTRAMUSCULAR; INTRAVENOUS
Status: DISCONTINUED | OUTPATIENT
Start: 2023-07-20 | End: 2023-07-20 | Stop reason: HOSPADM

## 2023-07-20 RX ORDER — MELOXICAM 7.5 MG/1
7.5 TABLET ORAL DAILY
Status: DISCONTINUED | OUTPATIENT
Start: 2023-07-21 | End: 2023-07-21 | Stop reason: HOSPADM

## 2023-07-20 RX ORDER — GABAPENTIN 300 MG/1
600 CAPSULE ORAL ONCE
Status: COMPLETED | OUTPATIENT
Start: 2023-07-20 | End: 2023-07-20

## 2023-07-20 RX ORDER — ASPIRIN 81 MG/1
81 TABLET ORAL DAILY
Status: DISCONTINUED | OUTPATIENT
Start: 2023-07-21 | End: 2023-07-21 | Stop reason: HOSPADM

## 2023-07-20 RX ORDER — MELOXICAM 7.5 MG/1
7.5 TABLET ORAL DAILY
Status: DISCONTINUED | OUTPATIENT
Start: 2023-07-20 | End: 2023-07-20 | Stop reason: SDUPTHER

## 2023-07-20 RX ORDER — ONDANSETRON 2 MG/ML
4 INJECTION INTRAMUSCULAR; INTRAVENOUS ONCE AS NEEDED
Status: DISCONTINUED | OUTPATIENT
Start: 2023-07-20 | End: 2023-07-20 | Stop reason: SDUPTHER

## 2023-07-20 RX ORDER — TERAZOSIN 5 MG/1
20 CAPSULE ORAL NIGHTLY
Status: DISCONTINUED | OUTPATIENT
Start: 2023-07-20 | End: 2023-07-21 | Stop reason: HOSPADM

## 2023-07-20 RX ORDER — ACETAMINOPHEN 500 MG
1000 TABLET ORAL EVERY 8 HOURS SCHEDULED
Status: DISCONTINUED | OUTPATIENT
Start: 2023-07-20 | End: 2023-07-21 | Stop reason: HOSPADM

## 2023-07-20 RX ORDER — FENTANYL CITRATE 50 UG/ML
50 INJECTION, SOLUTION INTRAMUSCULAR; INTRAVENOUS
Status: DISCONTINUED | OUTPATIENT
Start: 2023-07-20 | End: 2023-07-20 | Stop reason: SDUPTHER

## 2023-07-20 RX ORDER — OXYCODONE HYDROCHLORIDE 5 MG/1
5 TABLET ORAL EVERY 4 HOURS PRN
Qty: 10 TABLET | Refills: 0 | Status: SHIPPED | OUTPATIENT
Start: 2023-07-20 | End: 2023-07-31

## 2023-07-20 RX ORDER — SODIUM CHLORIDE 0.9 % (FLUSH) 0.9 %
3-10 SYRINGE (ML) INJECTION AS NEEDED
Status: DISCONTINUED | OUTPATIENT
Start: 2023-07-20 | End: 2023-07-20 | Stop reason: HOSPADM

## 2023-07-20 RX ORDER — DROPERIDOL 2.5 MG/ML
0.62 INJECTION, SOLUTION INTRAMUSCULAR; INTRAVENOUS
Status: DISCONTINUED | OUTPATIENT
Start: 2023-07-20 | End: 2023-07-20 | Stop reason: HOSPADM

## 2023-07-20 RX ORDER — HYDRALAZINE HYDROCHLORIDE 20 MG/ML
5 INJECTION INTRAMUSCULAR; INTRAVENOUS
Status: DISCONTINUED | OUTPATIENT
Start: 2023-07-20 | End: 2023-07-20 | Stop reason: HOSPADM

## 2023-07-20 RX ORDER — NICOTINE POLACRILEX 4 MG
15 LOZENGE BUCCAL
Status: DISCONTINUED | OUTPATIENT
Start: 2023-07-20 | End: 2023-07-21 | Stop reason: HOSPADM

## 2023-07-20 RX ORDER — ONDANSETRON 4 MG/1
4 TABLET, FILM COATED ORAL ONCE AS NEEDED
Status: DISCONTINUED | OUTPATIENT
Start: 2023-07-20 | End: 2023-07-20 | Stop reason: SDUPTHER

## 2023-07-20 RX ORDER — GABAPENTIN 400 MG/1
400 CAPSULE ORAL 3 TIMES DAILY
COMMUNITY

## 2023-07-20 RX ORDER — MELOXICAM 15 MG/1
7.5 TABLET ORAL ONCE AS NEEDED
Status: DISCONTINUED | OUTPATIENT
Start: 2023-07-20 | End: 2023-07-20 | Stop reason: HOSPADM

## 2023-07-20 RX ORDER — MAGNESIUM HYDROXIDE 1200 MG/15ML
LIQUID ORAL AS NEEDED
Status: DISCONTINUED | OUTPATIENT
Start: 2023-07-20 | End: 2023-07-20 | Stop reason: HOSPADM

## 2023-07-20 RX ORDER — HEPARIN SODIUM 5000 [USP'U]/ML
INJECTION, SOLUTION INTRAVENOUS; SUBCUTANEOUS AS NEEDED
Status: DISCONTINUED | OUTPATIENT
Start: 2023-07-20 | End: 2023-07-20 | Stop reason: HOSPADM

## 2023-07-20 RX ORDER — POLYETHYLENE GLYCOL 3350 17 G/17G
17 POWDER, FOR SOLUTION ORAL DAILY
Status: DISCONTINUED | OUTPATIENT
Start: 2023-07-21 | End: 2023-07-21 | Stop reason: HOSPADM

## 2023-07-20 RX ORDER — SODIUM CHLORIDE, SODIUM LACTATE, POTASSIUM CHLORIDE, CALCIUM CHLORIDE 600; 310; 30; 20 MG/100ML; MG/100ML; MG/100ML; MG/100ML
100 INJECTION, SOLUTION INTRAVENOUS CONTINUOUS
Status: DISCONTINUED | OUTPATIENT
Start: 2023-07-20 | End: 2023-07-21 | Stop reason: HOSPADM

## 2023-07-20 RX ORDER — HYDROMORPHONE HYDROCHLORIDE 1 MG/ML
0.5 INJECTION, SOLUTION INTRAMUSCULAR; INTRAVENOUS; SUBCUTANEOUS
Status: DISCONTINUED | OUTPATIENT
Start: 2023-07-20 | End: 2023-07-21 | Stop reason: HOSPADM

## 2023-07-20 RX ORDER — ONDANSETRON 4 MG/1
4 TABLET, FILM COATED ORAL EVERY 8 HOURS PRN
Status: DISCONTINUED | OUTPATIENT
Start: 2023-07-20 | End: 2023-07-21 | Stop reason: HOSPADM

## 2023-07-20 RX ORDER — INSULIN LISPRO 100 [IU]/ML
2-7 INJECTION, SOLUTION INTRAVENOUS; SUBCUTANEOUS
Status: DISCONTINUED | OUTPATIENT
Start: 2023-07-20 | End: 2023-07-21 | Stop reason: HOSPADM

## 2023-07-20 RX ORDER — HYDROMORPHONE HYDROCHLORIDE 1 MG/ML
0.5 INJECTION, SOLUTION INTRAMUSCULAR; INTRAVENOUS; SUBCUTANEOUS
Status: DISCONTINUED | OUTPATIENT
Start: 2023-07-20 | End: 2023-07-20 | Stop reason: HOSPADM

## 2023-07-20 RX ORDER — HYDROMORPHONE HYDROCHLORIDE 1 MG/ML
0.5 INJECTION, SOLUTION INTRAMUSCULAR; INTRAVENOUS; SUBCUTANEOUS
Status: DISCONTINUED | OUTPATIENT
Start: 2023-07-20 | End: 2023-07-20 | Stop reason: SDUPTHER

## 2023-07-20 RX ORDER — SODIUM CHLORIDE 0.9 % (FLUSH) 0.9 %
10 SYRINGE (ML) INJECTION EVERY 12 HOURS SCHEDULED
Status: DISCONTINUED | OUTPATIENT
Start: 2023-07-20 | End: 2023-07-20 | Stop reason: HOSPADM

## 2023-07-20 RX ORDER — POLYETHYLENE GLYCOL 3350 17 G/17G
17 POWDER, FOR SOLUTION ORAL DAILY
Qty: 238 G | Refills: 0 | Status: SHIPPED | OUTPATIENT
Start: 2023-07-20 | End: 2023-07-31

## 2023-07-20 RX ORDER — NALOXONE HCL 0.4 MG/ML
0.4 VIAL (ML) INJECTION AS NEEDED
Status: DISCONTINUED | OUTPATIENT
Start: 2023-07-20 | End: 2023-07-20 | Stop reason: HOSPADM

## 2023-07-20 RX ORDER — ONDANSETRON 2 MG/ML
4 INJECTION INTRAMUSCULAR; INTRAVENOUS ONCE AS NEEDED
Status: DISCONTINUED | OUTPATIENT
Start: 2023-07-20 | End: 2023-07-21 | Stop reason: HOSPADM

## 2023-07-20 RX ORDER — DEXTROSE MONOHYDRATE 25 G/50ML
25 INJECTION, SOLUTION INTRAVENOUS
Status: DISCONTINUED | OUTPATIENT
Start: 2023-07-20 | End: 2023-07-21 | Stop reason: HOSPADM

## 2023-07-20 RX ORDER — IPRATROPIUM BROMIDE AND ALBUTEROL SULFATE 2.5; .5 MG/3ML; MG/3ML
3 SOLUTION RESPIRATORY (INHALATION) ONCE AS NEEDED
Status: DISCONTINUED | OUTPATIENT
Start: 2023-07-20 | End: 2023-07-20 | Stop reason: HOSPADM

## 2023-07-20 RX ORDER — MELOXICAM 15 MG/1
7.5 TABLET ORAL ONCE AS NEEDED
Status: DISCONTINUED | OUTPATIENT
Start: 2023-07-20 | End: 2023-07-20 | Stop reason: SDUPTHER

## 2023-07-20 RX ORDER — GABAPENTIN 100 MG/1
100 CAPSULE ORAL 3 TIMES DAILY
Status: DISCONTINUED | OUTPATIENT
Start: 2023-07-20 | End: 2023-07-20 | Stop reason: SDUPTHER

## 2023-07-20 RX ORDER — DROPERIDOL 2.5 MG/ML
0.62 INJECTION, SOLUTION INTRAMUSCULAR; INTRAVENOUS ONCE AS NEEDED
Status: DISCONTINUED | OUTPATIENT
Start: 2023-07-20 | End: 2023-07-20 | Stop reason: HOSPADM

## 2023-07-20 RX ORDER — FAMOTIDINE 20 MG/1
20 TABLET, FILM COATED ORAL ONCE
Status: COMPLETED | OUTPATIENT
Start: 2023-07-20 | End: 2023-07-20

## 2023-07-20 RX ORDER — LABETALOL HYDROCHLORIDE 5 MG/ML
5 INJECTION, SOLUTION INTRAVENOUS
Status: DISCONTINUED | OUTPATIENT
Start: 2023-07-20 | End: 2023-07-20 | Stop reason: HOSPADM

## 2023-07-20 RX ORDER — SODIUM CHLORIDE 0.9 % (FLUSH) 0.9 %
10 SYRINGE (ML) INJECTION AS NEEDED
Status: DISCONTINUED | OUTPATIENT
Start: 2023-07-20 | End: 2023-07-20 | Stop reason: HOSPADM

## 2023-07-20 RX ORDER — ACETAMINOPHEN 325 MG/1
650 TABLET ORAL EVERY 4 HOURS PRN
Qty: 100 TABLET | Refills: 0 | Status: SHIPPED | OUTPATIENT
Start: 2023-07-20

## 2023-07-20 RX ORDER — MIDAZOLAM HYDROCHLORIDE 1 MG/ML
1 INJECTION INTRAMUSCULAR; INTRAVENOUS
Status: DISCONTINUED | OUTPATIENT
Start: 2023-07-20 | End: 2023-07-20 | Stop reason: HOSPADM

## 2023-07-20 RX ORDER — IBUPROFEN 600 MG/1
1 TABLET ORAL
Status: DISCONTINUED | OUTPATIENT
Start: 2023-07-20 | End: 2023-07-21 | Stop reason: HOSPADM

## 2023-07-20 RX ORDER — LEVETIRACETAM 500 MG/1
500 TABLET ORAL 2 TIMES DAILY
Status: DISCONTINUED | OUTPATIENT
Start: 2023-07-20 | End: 2023-07-21 | Stop reason: HOSPADM

## 2023-07-20 RX ORDER — SODIUM CHLORIDE 0.9 % (FLUSH) 0.9 %
3 SYRINGE (ML) INJECTION EVERY 12 HOURS SCHEDULED
Status: DISCONTINUED | OUTPATIENT
Start: 2023-07-20 | End: 2023-07-20 | Stop reason: HOSPADM

## 2023-07-20 RX ORDER — SODIUM CHLORIDE 9 MG/ML
INJECTION, SOLUTION INTRAVENOUS AS NEEDED
Status: DISCONTINUED | OUTPATIENT
Start: 2023-07-20 | End: 2023-07-20 | Stop reason: HOSPADM

## 2023-07-20 RX ORDER — OXYCODONE HYDROCHLORIDE 10 MG/1
10 TABLET ORAL EVERY 4 HOURS PRN
Status: DISCONTINUED | OUTPATIENT
Start: 2023-07-20 | End: 2023-07-20

## 2023-07-20 RX ORDER — SODIUM CHLORIDE 9 MG/ML
40 INJECTION, SOLUTION INTRAVENOUS AS NEEDED
Status: DISCONTINUED | OUTPATIENT
Start: 2023-07-20 | End: 2023-07-20 | Stop reason: HOSPADM

## 2023-07-20 RX ORDER — METOPROLOL TARTRATE 50 MG/1
50 TABLET, FILM COATED ORAL 2 TIMES DAILY
Status: DISCONTINUED | OUTPATIENT
Start: 2023-07-20 | End: 2023-07-21 | Stop reason: HOSPADM

## 2023-07-20 RX ORDER — PROMETHAZINE HYDROCHLORIDE 25 MG/1
25 TABLET ORAL ONCE AS NEEDED
Status: DISCONTINUED | OUTPATIENT
Start: 2023-07-20 | End: 2023-07-20 | Stop reason: HOSPADM

## 2023-07-20 RX ORDER — HEPARIN SODIUM 5000 [USP'U]/ML
5000 INJECTION, SOLUTION INTRAVENOUS; SUBCUTANEOUS ONCE
Status: DISCONTINUED | OUTPATIENT
Start: 2023-07-20 | End: 2023-07-20 | Stop reason: HOSPADM

## 2023-07-20 RX ADMIN — SERTRALINE 50 MG: 50 TABLET, FILM COATED ORAL at 19:41

## 2023-07-20 RX ADMIN — HYDROMORPHONE HYDROCHLORIDE 0.5 MG: 1 INJECTION, SOLUTION INTRAMUSCULAR; INTRAVENOUS; SUBCUTANEOUS at 17:19

## 2023-07-20 RX ADMIN — SODIUM CHLORIDE, POTASSIUM CHLORIDE, SODIUM LACTATE AND CALCIUM CHLORIDE 100 ML/HR: 600; 310; 30; 20 INJECTION, SOLUTION INTRAVENOUS at 20:00

## 2023-07-20 RX ADMIN — FAMOTIDINE 20 MG: 20 TABLET, FILM COATED ORAL at 09:31

## 2023-07-20 RX ADMIN — INSULIN LISPRO 3 UNITS: 100 INJECTION, SOLUTION INTRAVENOUS; SUBCUTANEOUS at 20:10

## 2023-07-20 RX ADMIN — ACETAMINOPHEN 500 MG: 500 TABLET ORAL at 09:31

## 2023-07-20 RX ADMIN — VANCOMYCIN HYDROCHLORIDE 1000 MG: 1 INJECTION, SOLUTION INTRAVENOUS at 11:05

## 2023-07-20 RX ADMIN — BUSPIRONE HYDROCHLORIDE 5 MG: 10 TABLET ORAL at 20:10

## 2023-07-20 RX ADMIN — HYDROMORPHONE HYDROCHLORIDE 0.5 MG: 1 INJECTION, SOLUTION INTRAMUSCULAR; INTRAVENOUS; SUBCUTANEOUS at 17:07

## 2023-07-20 RX ADMIN — METOPROLOL TARTRATE 50 MG: 50 TABLET ORAL at 20:10

## 2023-07-20 RX ADMIN — GABAPENTIN 600 MG: 300 CAPSULE ORAL at 09:31

## 2023-07-20 RX ADMIN — LEVETIRACETAM 500 MG: 500 TABLET, FILM COATED ORAL at 20:10

## 2023-07-20 RX ADMIN — LIDOCAINE HYDROCHLORIDE 0.5 ML: 10 INJECTION, SOLUTION EPIDURAL; INFILTRATION; INTRACAUDAL; PERINEURAL at 09:31

## 2023-07-20 RX ADMIN — HYDROMORPHONE HYDROCHLORIDE 0.5 MG: 1 INJECTION, SOLUTION INTRAMUSCULAR; INTRAVENOUS; SUBCUTANEOUS at 15:17

## 2023-07-20 RX ADMIN — DULOXETINE HYDROCHLORIDE 90 MG: 60 CAPSULE, DELAYED RELEASE ORAL at 19:41

## 2023-07-20 RX ADMIN — SODIUM CHLORIDE, POTASSIUM CHLORIDE, SODIUM LACTATE AND CALCIUM CHLORIDE 9 ML/HR: 600; 310; 30; 20 INJECTION, SOLUTION INTRAVENOUS at 09:43

## 2023-07-20 RX ADMIN — GABAPENTIN 400 MG: 400 CAPSULE ORAL at 20:10

## 2023-07-20 RX ADMIN — ACETAMINOPHEN 1000 MG: 500 TABLET ORAL at 21:35

## 2023-07-20 RX ADMIN — TERAZOSIN HYDROCHLORIDE 20 MG: 5 CAPSULE ORAL at 20:10

## 2023-07-20 NOTE — OP NOTE
Operative  Procedure Note      Pre-operative Diagnosis: Postoperative vaginal bleeding    Post-operative Diagnosis: Left vaginal sidewall laceration    Operation: Exam under anesthesia and repair of left vaginal sidewall laceration    Surgeon: Monisha Greene MD     Assistants: None       Anesthesia: General endotracheal anesthesia    Findings: 3 cm left vaginal sidewall laceration, previously sutured.  No evidence of vaginal cuff bleeding.  Lateral superficial periurethral lacerations, hemostatic.    Estimated Blood Loss: 25 mL    Specimens: None    Complications:  None    Disposition: PACU - hemodynamically stable.      Procedure Details    The patient was seen in PACU and assessed for vaginal bleeding.  The findings were discussed and need for exam under anesthesia was reviewed with the patient.  Consent was obtained.. The risks, benefits, complications, treatment options, and expected outcomes were discussed with the patient. The patient concurred with the proposed plan, giving informed consent. The patient was identified as Sigrid Bello and the procedure verified as exam under anesthesia.   A Time Out was held and the above information confirmed.    After induction of anesthesia, the patient was positioned in Donny stirrups and prepped and draped in the usual sterile fashion.  A Teresa retractor was placed into the vagina, both anterior and posterior.  The vaginal cuff was inspected.  It was well approximated without evidence of bleeding.  Inspection of the vagina demonstrated the previously oversewn left vaginal sidewall laceration bled upon manipulation.  No other source of bleeding was identified.  The left vaginal sidewall was then oversewn with a 2-0 Vicryl and observed for hemostasis.  A sponge was placed into the vagina and the patient was observed in the operating room for an additional 10 minutes.  No further vaginal bleeding was noted.    The counts were correct x 2 and the patient was  awakened on the table. She was taken to RR in stable condition.       Monisha Greene MD  07/20/23  16:00 EDT

## 2023-07-20 NOTE — PROGRESS NOTES
Called to see patient with onset of vaginal bleeding when she got up to void.  She remains hemodynamically stable.  Bleeding appears to be about 150 mL, however difficult to evaluate as most is in the toilet.  Given size of her uterus, vaginal morcellation and repair of vaginal laceration, I suspect this bleeding is likely to be secondary to vaginal trauma.  Plan to return the the OR for EUA.  Discussed plan of care with patient who agrees to proceed.

## 2023-07-20 NOTE — INTERVAL H&P NOTE
[facility] Pre-op    Full history and physical note from office is attached.    /77 (BP Location: Right arm, Patient Position: Lying)   Pulse 59   Temp 97.8 °F (36.6 °C) (Temporal)   Resp 16   SpO2 97%     Immunizations:  Influenza:  2023  Pneumococcal:  UTD  Tetanus:  UTD  Covid : x3    Review of Systems:  Constitutional-- No fever, chills or sweats. No fatigue.  CV-- No chest pain, palpitation or syncope  Resp-- No SOB, cough, hemoptysis  Skin--No rashes or lesions    LAB Results:  Lab Results   Component Value Date    WBC 7.97 07/12/2023    HGB 12.7 07/12/2023    HCT 38.4 07/12/2023    MCV 85.5 07/12/2023     07/12/2023    NEUTROABS 4.18 07/12/2023    GLUCOSE 141 (H) 05/18/2023    BUN 15 05/18/2023    CREATININE 0.90 05/18/2023    EGFRIFNONA 59 09/23/2019    EGFRIFAFRI >60 09/23/2019     05/18/2023    K 4.1 07/12/2023     05/18/2023    CO2 26.0 05/18/2023    MG 2.0 05/18/2023    CALCIUM 9.3 05/18/2023    ALBUMIN 3.9 05/18/2023    AST 13 05/18/2023    ALT 7 05/18/2023    BILITOT 0.2 05/18/2023    PTT 27.6 09/20/2019    INR 0.9 09/20/2019       Cancer Staging (if applicable)  Cancer Patient: __ yes __no __unknown__N/A; If yes, clinical stage T:__ N:__M:__, stage group or __N/A      Impression: Menorrhagia, premenopausal       Plan: TOTAL LAPAROSCOPIC HYSTERECTOMY BILATERAL SALPINGECTOMY WITH DAVINCI ROBOT       TAWNY Jewell   7/20/2023   09:28 EDT

## 2023-07-20 NOTE — OP NOTE
Hysterectomy Procedure Note      Pre-operative Diagnosis: Menorrhagia and Fibroids    Post-operative Diagnosis: Menorrhagia and Fibroids    Operation: Robotic Hysterectomy of uterus greater than 250 grams    Surgeon: Monisha Greene MD     Assistants: Assistant: Mark Longoria RNFA was responsible for performing the following activities: Retraction, Suction, Irrigation, Suturing, Closing, and Placing Dressing and their skilled assistance was necessary for the success of this case.       Anesthesia: General endotracheal anesthesia    Findings: Enlarged uterus and Fibroid uterus, 914 g    Estimated Blood Loss: 100      Specimens: Uterus with cervix and tubes. Weight >250 grams    Complications:  None    Disposition: PACU - hemodynamically stable.      Procedure Details    The patient was seen in the Holding Room. The risks, benefits, complications, treatment options, and expected outcomes were discussed with the patient. The patient concurred with the proposed plan, giving informed consent. The patient was identified as Sigrid Bello and the procedure verified as Robotic hysterectomy with bilateral salpingectomy. A Time Out was held and the above information confirmed.    After induction of anesthesia, the patient was prepped and draped in the usual sterile manner. The uterus was sounded and fit with a PacerPro-Koh intrauterine manipulator.    The operators gloves were changed.  The supraumbilical area was injected with a dilute marcaine solution.  An 8mm supraumbilical incision was made. Intraperitoneal access was gained with the optical noncutting trocar under direct visualization. CO2 was used to insufflate the abdominopelvic cavity to a pressure of approximately 15 mm Hg. She was placed in maximum Trendelenburg. One additional 8mm port site was placed on the left and two additional 8mm ports sites were placed on the right.     The DaVinci system was docked and the procedure continued at the console.  The ureters were identified bilaterally. The left fallopian tube was placed on tension.  Visible vessels within the mesosalpinx were fulgurated.  The tube was excised from the ovary and the broad ligament with monopolar and blunt dissection. The uteroovarian ligament was then fulgurated and transected.  The remainder of the broad ligament, down to and including the round ligament was fulguration and transected. The bladder was dissected across the midline with monopolar, sharp and mick dissection. The procedure was repeated on the right side. The uterine vessels on the left and right were isolated, fulgurated and transected. A circumferential incision was made along the cervicovaginal junction.  The uterus was too large to remove through the colpotomy.  The posterior vagina was opened in the midline for an additional 3 cm.  The rectovaginal space was not entered.       The uterus was then bisected to allow for transvaginal access for morcellation.  Uterus was further morcellated transvaginally.  The uterus was removed from the vagina.  A small vaginal laceration was noted it was reapproximated with 2-0 Vicryl.  There was good hemostasis.    The procedure then continued at the console.  The vaginal cuff was then closed with 2-0 vicryl and 0 vicryl. There was good hemostasis at low pressure.  The bladder was retrograde filled with 300 mL sterile saline and bladder integrity was observed. The DaVinci system was undocked and the procedure was completed at the bedside. The trocars were removed. The skin incisions were closed with 3-0 plain and reinforced with skin glue. The counts were correct x 2. Patient was awakened and taken to recover in stable condition.       Monisha Greene MD  07/20/23  13:42 EDT

## 2023-07-20 NOTE — PROGRESS NOTES
[facility] Medicine Services  PROGRESS NOTE    Patient Name: Sigrid Bello  : 1970  MRN: [id number]    Date of Admission: 2023  Primary Care Physician: Roverto Israel APRN    Subjective   Subjective     CC:  Diabetes management    HPI:  Patient resting in bed. Says she has some soreness from surgery. Denies N/V. Is not passing gas yet. She states she has not had anything to eat since a small amount of food this morning when she took her morning meds. She was diagnosed with DM1 as a child and follows with Dr. Li with Endocrinology. She has a Dexcom to check her BG. She takes 10 units Basaglar in the morning and evening and does sliding scale insulin according to BG readings. She has had episodes of hypoglycemia in the past but has fewer since getting the Dexcom.    ROS:  Gen- No fevers, chills  CV- No chest pain, palpitations  Resp- No cough, dyspnea  GI- No N/V/D, abd pain       Objective   Objective     Vital Signs:   Temp:  [96.6 °F (35.9 °C)-98.2 °F (36.8 °C)] 98.2 °F (36.8 °C)  Heart Rate:  [59-81] 79  Resp:  [12-17] 17  BP: ()/(62-84) 151/62  Flow (L/min):  [2-15] 3     Physical Exam:  Constitutional: No acute distress, awake, alert  HENT: NCAT, mucous membranes moist  Respiratory: Clear to auscultation bilaterally, respiratory effort normal, O2  Cardiovascular: RRR, no murmurs, rubs, or gallops  Gastrointestinal: Absent bowel sounds, soft, nondistended, lap sites intact, sanguinous drainage on RLQ dressing site  Musculoskeletal: No bilateral ankle edema  Psychiatric: Appropriate affect, cooperative  Neurologic: Oriented x 3, moves all extremities, Cranial Nerves grossly intact to confrontation, speech clear  Skin: No rashes, lap sites s/p hysterectomy      Results Reviewed:  LAB RESULTS:                              Brief Urine Lab Results  (Last result in the past 365 days)        Color   Clarity   Blood   Leuk Est   Nitrite   Protein   CREAT    Urine HCG        07/20/23 0948               Negative               Microbiology Results Abnormal       None            No radiology results from the last 24 hrs    Results for orders placed during the hospital encounter of 06/05/23    Adult Transthoracic Echo Complete W/ Cont if Necessary Per Protocol    Interpretation Summary    Left ventricular systolic function is normal. Left ventricular ejection fraction appears to be 56 - 60%.    There is bowing of the mitral valve leaflets without elisabeth prolapse present.  No significant regurgitation noted.      Current medications:  Scheduled Meds:acetaminophen, 1,000 mg, Oral, Q8H  [START ON 7/21/2023] aspirin, 81 mg, Oral, Daily  busPIRone, 5 mg, Oral, TID  DULoxetine, 90 mg, Oral, Daily  gabapentin, 400 mg, Oral, TID  levETIRAcetam, 500 mg, Oral, BID  [START ON 7/21/2023] meloxicam, 7.5 mg, Oral, Daily  metoprolol tartrate, 50 mg, Oral, BID  [START ON 7/21/2023] polyethylene glycol, 17 g, Oral, Daily  sertraline, 50 mg, Oral, Daily  terazosin, 20 mg, Oral, Nightly      Continuous Infusions:lactated ringers, 9 mL/hr, Last Rate: 9 mL/hr (07/20/23 1115)  lactated ringers, 100 mL/hr      PRN Meds:.  albuterol    HYDROmorphone **AND** naloxone    ondansetron    ondansetron    oxyCODONE    simethicone    Assessment & Plan   Assessment & Plan     Active Hospital Problems    Diagnosis  POA    **Menorrhagia, premenopausal [N92.4]  Yes    Fibroid [D21.9]  Yes    Non-obstetric vaginal laceration without perineal laceration [S31.41XA]  No    S/P abdominal hysterectomy [Z90.710]  Yes    Type 1 diabetes mellitus with other specified complication [E10.69]  Yes      Resolved Hospital Problems   No resolved problems to display.        Brief Hospital Course to date:  Sigrid Bello is a 52 y.o. female Sigrid Bello is a 52 y.o. female with PMH significant for menorrhagia, anemia, lupus and DM type I who presented to [facility] for a planned total laparoscopic hysterectomy  bilateral salpingectomy with Dr. Greene today.  Hospital medicine has been consulted for diabetes management.     Abnormal uterine bleeding  S/p abdominal hysterectomy 7/20/23  --post-op care and pain management per Dr. Greene/GYN    DM1  --A1c 5.1 on 5/3/2023  --Follows with Dr. Li with [facility]  --ACHS fingersticks  --Hold basal insulin until patient is eating, likely okay to start tomorrow morning  --Low-dose sliding scale Humalog only for now  --Resume basal-bolus regimen at discharge      Expected Discharge Location and Transportation: Home, car  Expected Discharge Per GYN  Expected Discharge Date: 7/21/2023; Expected Discharge Time:      DVT prophylaxis:  Mechanical DVT prophylaxis orders are present.          CODE STATUS:   Code Status and Medical Interventions:   Ordered at: 07/20/23 1813     Level Of Support Discussed With:    Patient     Code Status (Patient has no pulse and is not breathing):    CPR (Attempt to Resuscitate)     Medical Interventions (Patient has pulse or is breathing):    Full Support     Release to patient:    Routine Release       Elisabeth Romo, APRN  07/20/23

## 2023-07-21 ENCOUNTER — TELEPHONE (OUTPATIENT)
Dept: INTERNAL MEDICINE | Facility: CLINIC | Age: 53
End: 2023-07-21

## 2023-07-21 VITALS
HEART RATE: 82 BPM | OXYGEN SATURATION: 91 % | RESPIRATION RATE: 18 BRPM | WEIGHT: 190 LBS | TEMPERATURE: 98.7 F | SYSTOLIC BLOOD PRESSURE: 94 MMHG | HEIGHT: 66 IN | DIASTOLIC BLOOD PRESSURE: 52 MMHG | BODY MASS INDEX: 30.53 KG/M2

## 2023-07-21 PROBLEM — Z90.710 S/P ABDOMINAL HYSTERECTOMY: Status: RESOLVED | Noted: 2023-07-20 | Resolved: 2023-07-21

## 2023-07-21 LAB
DEPRECATED RDW RBC AUTO: 45.7 FL (ref 37–54)
ERYTHROCYTE [DISTWIDTH] IN BLOOD BY AUTOMATED COUNT: 14.1 % (ref 12.3–15.4)
GLUCOSE BLDC GLUCOMTR-MCNC: 123 MG/DL (ref 70–130)
HCT VFR BLD AUTO: 35.6 % (ref 34–46.6)
HGB BLD-MCNC: 11.4 G/DL (ref 12–15.9)
MCH RBC QN AUTO: 28.6 PG (ref 26.6–33)
MCHC RBC AUTO-ENTMCNC: 32 G/DL (ref 31.5–35.7)
MCV RBC AUTO: 89.2 FL (ref 79–97)
PLATELET # BLD AUTO: 129 10*3/MM3 (ref 140–450)
PMV BLD AUTO: 11.8 FL (ref 6–12)
RBC # BLD AUTO: 3.99 10*6/MM3 (ref 3.77–5.28)
WBC NRBC COR # BLD: 7.88 10*3/MM3 (ref 3.4–10.8)

## 2023-07-21 PROCEDURE — 82948 REAGENT STRIP/BLOOD GLUCOSE: CPT

## 2023-07-21 PROCEDURE — G0378 HOSPITAL OBSERVATION PER HR: HCPCS

## 2023-07-21 PROCEDURE — 85027 COMPLETE CBC AUTOMATED: CPT | Performed by: OBSTETRICS & GYNECOLOGY

## 2023-07-21 RX ADMIN — ASPIRIN 81 MG: 81 TABLET, COATED ORAL at 08:19

## 2023-07-21 RX ADMIN — BUSPIRONE HYDROCHLORIDE 5 MG: 10 TABLET ORAL at 08:19

## 2023-07-21 RX ADMIN — SERTRALINE 50 MG: 50 TABLET, FILM COATED ORAL at 08:19

## 2023-07-21 RX ADMIN — LEVETIRACETAM 500 MG: 500 TABLET, FILM COATED ORAL at 08:19

## 2023-07-21 RX ADMIN — GABAPENTIN 400 MG: 400 CAPSULE ORAL at 08:19

## 2023-07-21 RX ADMIN — POLYETHYLENE GLYCOL 3350 17 G: 17 POWDER, FOR SOLUTION ORAL at 08:19

## 2023-07-21 RX ADMIN — DULOXETINE HYDROCHLORIDE 90 MG: 60 CAPSULE, DELAYED RELEASE ORAL at 08:19

## 2023-07-21 RX ADMIN — MELOXICAM 7.5 MG: 7.5 TABLET ORAL at 08:19

## 2023-07-21 RX ADMIN — METOPROLOL TARTRATE 50 MG: 50 TABLET ORAL at 08:19

## 2023-07-21 RX ADMIN — ACETAMINOPHEN 1000 MG: 500 TABLET ORAL at 05:33

## 2023-07-21 NOTE — PLAN OF CARE
Goal Outcome Evaluation:           Progress: improving          Tolerating PO. Patient up ad charmaine to restroom. Coverage administered for elevated blood sugar. Reports scant amount of bloody vaginal discharge not present on assessment. Lap sites intact with dried drainage.

## 2023-07-21 NOTE — PROGRESS NOTES
Nutrition Services    Patient Name:  Sigrid Bello  YOB: 1970  MRN: [id number]  Admit Date:  7/20/2023    Visited with pt at bedside, informed RD she is discharging. Denied any nutritional questions/concerns and stated tolerating diet without issue.     Electronically signed by:  Michelle Benites RD  07/21/23 09:23 EDT

## 2023-07-21 NOTE — TELEPHONE ENCOUNTER
Patient was called and told we do not see the paperwork. I reviewed the chart. I asked her to bring them by on Monday and I would work on them.

## 2023-07-21 NOTE — PROGRESS NOTES
7/21/2023    Name:Sigrid Bello    MR#:[id number]     PROGRESS NOTE:  Post-Op      Subjective   52 y.o. yo Female, POD 1, doing well. Pain well controlled. She has had no significant bleeding overnight.    Menorrhagia, premenopausal    Type 1 diabetes mellitus with other specified complication    Fibroid    Non-obstetric vaginal laceration without perineal laceration    S/P abdominal hysterectomy        Objective    Vitals  Temp:  Temp:  [96.6 °F (35.9 °C)-98.7 °F (37.1 °C)] 98.7 °F (37.1 °C)  Temp src: Temporal  BP:  BP: ()/(52-84) 94/52  Pulse:  Heart Rate:  [59-82] 82  RR:   Resp:  [12-18] 18    General Awake, alert, no distress  Abdomen Soft, nondistended, appropriately tender, +bs  Incision  Intact, no erythema or exudate  Extremities Calves NT bilaterally     I/O last 3 completed shifts:  In: 1040 [P.O.:240; I.V.:800]  Out: 700 [Urine:700]    LABS:   Lab Results   Component Value Date    WBC 7.97 07/12/2023    HGB 12.7 07/12/2023    HCT 38.4 07/12/2023    MCV 85.5 07/12/2023     07/12/2023             Assessment   1.  POD 1 from Robotic hysterectomy and repair of vaginal laceration.  Stable for discharge home today.               Monisha Greene MD  7/21/2023 08:07 EDT

## 2023-07-21 NOTE — TELEPHONE ENCOUNTER
Caller: Sigrid Bello    Relationship: Self    Best call back number: [phone]    What form or medical record are you requesting: [employer]ARIAN[employer] PAPERWORK     Who is requesting this form or medical record from you: PATIENT AND PATIENTS EMPLOYER     How would you like to receive the form or medical records (pick-up, mail, fax): FAX TO THE NUMBER ON THE FORM     Timeframe paperwork needed: AS SOON AS POSSIBLE     Additional notes: THE PATIENT STATES THAT THIS PAPERWORK WAS FAXED TO DOCTOR KULKARNI A MONTH AGO AND IT WAS NEVER FILLED OUT SHE STATES THAT SHE NEEDS THE PAPERWORK COMPLETED AS SOON AS POSSIBLE    PLEASE CALL THE PATIENT TO LET HER KNOW IF THAT WAS COMPLETED

## 2023-07-24 ENCOUNTER — TRANSITIONAL CARE MANAGEMENT TELEPHONE ENCOUNTER (OUTPATIENT)
Dept: CALL CENTER | Facility: HOSPITAL | Age: 53
End: 2023-07-24
Payer: COMMERCIAL

## 2023-07-24 LAB
CYTO UR: NORMAL
LAB AP CASE REPORT: NORMAL
LAB AP CLINICAL INFORMATION: NORMAL
PATH REPORT.FINAL DX SPEC: NORMAL
PATH REPORT.GROSS SPEC: NORMAL

## 2023-07-24 NOTE — OUTREACH NOTE
Call Center TCM Note      Flowsheet Row Responses   [facility] patient discharged from? [place]   Does the patient have one of the following disease processes/diagnoses(primary or secondary)? General Surgery   TCM attempt successful? No   Unsuccessful attempts Attempt 2            Colette Dinh RN    7/24/2023, 11:56 EDT

## 2023-07-24 NOTE — OUTREACH NOTE
Call Center TCM Note      Flowsheet Row Responses   [facility] patient discharged from? [place]   Does the patient have one of the following disease processes/diagnoses(primary or secondary)? General Surgery   TCM attempt successful? No   Unsuccessful attempts Attempt 1  [Attempted to reach patient and friend Ferny Sandyn, listed on PCP verbal release. No answer. ]            Colette Dinh RN    7/24/2023, 08:28 EDT

## 2023-07-25 ENCOUNTER — TRANSITIONAL CARE MANAGEMENT TELEPHONE ENCOUNTER (OUTPATIENT)
Dept: CALL CENTER | Facility: HOSPITAL | Age: 53
End: 2023-07-25
Payer: COMMERCIAL

## 2023-07-25 NOTE — OUTREACH NOTE
Call Center TCM Note      Flowsheet Row Responses   [facility] patient discharged from? [place]   Does the patient have one of the following disease processes/diagnoses(primary or secondary)? General Surgery   TCM attempt successful? No   Unsuccessful attempts Attempt 3            Rona Miller RN    7/25/2023, 08:19 EDT

## 2023-07-31 ENCOUNTER — TELEMEDICINE (OUTPATIENT)
Dept: PSYCHIATRY | Facility: CLINIC | Age: 53
End: 2023-07-31
Payer: COMMERCIAL

## 2023-07-31 ENCOUNTER — OFFICE VISIT (OUTPATIENT)
Dept: INTERNAL MEDICINE | Facility: CLINIC | Age: 53
End: 2023-07-31
Payer: COMMERCIAL

## 2023-07-31 VITALS
WEIGHT: 189 LBS | DIASTOLIC BLOOD PRESSURE: 62 MMHG | SYSTOLIC BLOOD PRESSURE: 100 MMHG | TEMPERATURE: 98.4 F | BODY MASS INDEX: 30.37 KG/M2 | OXYGEN SATURATION: 99 % | HEART RATE: 78 BPM | HEIGHT: 66 IN

## 2023-07-31 DIAGNOSIS — F51.01 PRIMARY INSOMNIA: ICD-10-CM

## 2023-07-31 DIAGNOSIS — F43.10 POST TRAUMATIC STRESS DISORDER (PTSD): ICD-10-CM

## 2023-07-31 DIAGNOSIS — F33.1 MODERATE EPISODE OF RECURRENT MAJOR DEPRESSIVE DISORDER: Primary | ICD-10-CM

## 2023-07-31 DIAGNOSIS — M79.7 FIBROMYALGIA: ICD-10-CM

## 2023-07-31 DIAGNOSIS — Z90.710 STATUS POST HYSTERECTOMY: ICD-10-CM

## 2023-07-31 DIAGNOSIS — R10.31 RIGHT LOWER QUADRANT PAIN: ICD-10-CM

## 2023-07-31 DIAGNOSIS — Z09 HOSPITAL DISCHARGE FOLLOW-UP: Primary | ICD-10-CM

## 2023-07-31 PROCEDURE — 3044F HG A1C LEVEL LT 7.0%: CPT | Performed by: NURSE PRACTITIONER

## 2023-07-31 PROCEDURE — 99214 OFFICE O/P EST MOD 30 MIN: CPT | Performed by: NURSE PRACTITIONER

## 2023-07-31 PROCEDURE — 1159F MED LIST DOCD IN RCRD: CPT | Performed by: NURSE PRACTITIONER

## 2023-07-31 PROCEDURE — 1160F RVW MEDS BY RX/DR IN RCRD: CPT | Performed by: NURSE PRACTITIONER

## 2023-07-31 RX ORDER — TRAZODONE HYDROCHLORIDE 50 MG/1
TABLET ORAL
Qty: 60 TABLET | Refills: 5 | Status: SHIPPED | OUTPATIENT
Start: 2023-07-31

## 2023-08-02 ENCOUNTER — TELEMEDICINE (OUTPATIENT)
Dept: PSYCHIATRY | Facility: CLINIC | Age: 53
End: 2023-08-02
Payer: COMMERCIAL

## 2023-08-02 DIAGNOSIS — F43.10 POST TRAUMATIC STRESS DISORDER (PTSD): ICD-10-CM

## 2023-08-02 DIAGNOSIS — F33.1 MODERATE EPISODE OF RECURRENT MAJOR DEPRESSIVE DISORDER: Primary | ICD-10-CM

## 2023-08-02 RX ORDER — PRAZOSIN HYDROCHLORIDE 5 MG/1
10 CAPSULE ORAL NIGHTLY
Qty: 60 CAPSULE | Refills: 2 | Status: SHIPPED | OUTPATIENT
Start: 2023-08-02 | End: 2023-10-31

## 2023-08-02 RX ORDER — BUSPIRONE HYDROCHLORIDE 5 MG/1
5 TABLET ORAL 3 TIMES DAILY
Qty: 90 TABLET | Refills: 2 | Status: SHIPPED | OUTPATIENT
Start: 2023-08-02

## 2023-08-02 RX ORDER — DULOXETIN HYDROCHLORIDE 30 MG/1
90 CAPSULE, DELAYED RELEASE ORAL DAILY
Qty: 90 CAPSULE | Refills: 2 | Status: SHIPPED | OUTPATIENT
Start: 2023-08-02 | End: 2023-10-31

## 2023-08-07 DIAGNOSIS — R56.9 GENERALIZED CONVULSIVE SEIZURES: ICD-10-CM

## 2023-08-07 RX ORDER — LEVETIRACETAM 500 MG/1
TABLET ORAL
Qty: 60 TABLET | Refills: 2 | OUTPATIENT
Start: 2023-08-07

## 2023-08-22 ENCOUNTER — TELEMEDICINE (OUTPATIENT)
Dept: PSYCHIATRY | Facility: CLINIC | Age: 53
End: 2023-08-22
Payer: COMMERCIAL

## 2023-08-22 NOTE — PROGRESS NOTES
Date: August 29, 2023  Time In: 11:00AM  Time Out: 11:30AM  This provider is located at [place], [place] for [facility] (through [facility]), [address], [place] [address] using a secure [employer] Video Visit through [employer]. Patient is being seen remotely via telehealth at home address in [place] and stated they are in a secure environment for this session. The patient's condition being diagnosed/treated is appropriate for telemedicine. The provider identified herself as well as her credentials. The patient, and/or patients guardian, consent to be seen remotely, and when consent is given they understand that the consent allows for patient identifiable information to be sent to a third party as needed. They may refuse to be seen remotely at any time. The electronic data is encrypted and password protected, and the patient and/or guardian has been advised of the potential risks to privacy not withstanding such measures.     You have chosen to receive care through a telehealth visit.  Do you consent to use a video/audio connection for your medical care today? Yes    PROGRESS NOTE  Data:  Sigrid Bello is a 53 y.o. female who presents today for follow up    Chief Complaint: Anxiety, Depression, Recent Surgery     History of Present Illness: Patient reports fluctuating anxiety and depression trough the week. Patient reports stress and frustration with lasting recovery from recent surgery. Patient reports an error occurred during surgery and increased recovery time.  Patient reports stress due to leaking roof and upstairs sink which landlord is not repairing.  Patient reports anxiety and depression about current financial stress due to not being able to work.  Patient is making progress on making self-care a priority and working towards limiting additional stress.  Patient was still in bed on medical bed rest and session was decreased to 30 mins.        Clinical Maneuvering/Intervention: CBT, MI, Patient Centered    (Scales based on 0 - 10 with 10 being the worst)  Depression 8 Anxiety: 8       Assisted patient in processing above session content; acknowledged and normalized patient's thoughts, feelings, and concerns.  Rationalized patient thought process regarding recent stressors and life events. Discussed triggers associated with patient's emotions. Also discussed coping skills for patient to implement. Discussed taking small steps towards a full recovery.  Processed emotions and thoughts surrounding limited mobility and not working.  Explored ways to improve thinking and obtaining some exercise in a safe way.  Discussed continued work towards short-term goals and practicing self-care skills through the week.    Allowed patient to freely discuss issues without interruption or judgment. Provided safe, confidential environment to facilitate the development of positive therapeutic relationship and encourage open, honest communication. Assisted patient in identifying risk factors which would indicate the need for higher level of care including thoughts to harm self or others and/or self-harming behavior and encouraged patient to contact this office, call 911, or present to the nearest emergency room should any of these events occur. Discussed crisis intervention services and means to access. Patient adamantly and convincingly denies current suicidal or homicidal ideation or perceptual disturbance.    Assessment:   Assessment   Patient appears to maintain relative stability as compared to their baseline.  However, patient continues to struggle with anxiety and depression which continues to cause impairment in important areas of functioning.  A result, they can be reasonably expected to continue to benefit from treatment and would likely be at increased risk for decompensation otherwise.    Mental Status Exam:   Hygiene:   good  Cooperation:  Cooperative  Eye  Contact:  Good  Psychomotor Behavior:  Appropriate  Affect:  Full range  Mood: anxious  Speech:  Normal  Thought Process:  Linear  Thought Content:  Mood congruent  Suicidal:  None  Homicidal:  None  Hallucinations:  None  Delusion:  None  Memory:  Intact  Orientation:  Person, Place, Time and Situation  Reliability:  fair  Insight:  Fair  Judgement:  Fair  Impulse Control:  Fair  Physical/Medical Issues:  No        Patient's Support Network Includes:  significant other and extended family    Functional Status: Mild impairment     Progress toward goal: Patient is working towards achieving short-term health goals and utilizing self care practices during her recovery..     Prognosis: Good with Ongoing Treatment            Plan:    Patient will continue in individual outpatient therapy with focus on improved functioning and coping skills, maintaining stability, and avoiding decompensation and the need for higher level of care.    Patient will adhere to medication regimen as prescribed and report any side effects. Patient will contact this office, call 911 or present to the nearest emergency room should suicidal or homicidal ideations occur. Provide Cognitive Behavioral Therapy and Solution Focused Therapy to improve functioning, maintain stability, and avoid decompensation and the need for higher level of care.     Return in about 2 weeks, or earlier if symptoms worsen or fail to improve.           VISIT DIAGNOSIS:     ICD-10-CM ICD-9-CM   1. Moderate episode of recurrent major depressive disorder  F33.1 296.32   2. Post traumatic stress disorder (PTSD)  F43.10 309.81        Diagnoses and all orders for this visit:    1. Moderate episode of recurrent major depressive disorder (Primary)    2. Post traumatic stress disorder (PTSD)           [facility] No Show Policy:  We understand unexpected circumstances arise; however, anytime you miss your appointment we are unable to provide you appropriate  care.  In addition, each appointment missed could have been used to provide care for others.  We ask that you call at least 24 hours in advance to cancel or reschedule an appointment.  We would like to take this opportunity to remind you of our policy stating patients who miss THREE or more appointments without cancelling or rescheduling 24 hours in advance of the appointment may be subject to cancellation of any further visits with our clinic and recommendation to seek in-person services/visits.    Please call [phone] to reschedule your appointment. If there are reasons that make it difficult for you to keep the appointments, please call and let us know how we can help.  Please understand that medication prescribing will not continue without seeing your provider.      [facility]'s No Show Policy reviewed with patient at today's visit. Patient verbalized understanding of this policy. Discussed with patient that in the event that there are three or more no show visits, it will be recommended that they pursue in-person services/visits as noncompliance with telehealth visits indicates that patient is not an appropriate candidate for telemedicine and would likely be more appropriate for in-person services/visits. Patient verbalizes understanding and is agreeable to this.       This document has been electronically signed by Yury Felix III, LCSW  August 29, 2023 13:50 EDT      Part of this note may be an electronic transcription/translation of spoken language to printed text using the Dragon Dictation System.

## 2023-08-23 ENCOUNTER — OFFICE VISIT (OUTPATIENT)
Dept: INTERNAL MEDICINE | Facility: CLINIC | Age: 53
End: 2023-08-23
Payer: COMMERCIAL

## 2023-08-23 ENCOUNTER — TELEPHONE (OUTPATIENT)
Dept: INTERNAL MEDICINE | Facility: CLINIC | Age: 53
End: 2023-08-23

## 2023-08-23 VITALS
DIASTOLIC BLOOD PRESSURE: 78 MMHG | OXYGEN SATURATION: 99 % | WEIGHT: 187 LBS | SYSTOLIC BLOOD PRESSURE: 108 MMHG | HEIGHT: 66 IN | BODY MASS INDEX: 30.05 KG/M2 | HEART RATE: 78 BPM | TEMPERATURE: 98.1 F

## 2023-08-23 DIAGNOSIS — Z79.4 TYPE 2 DIABETES MELLITUS WITHOUT COMPLICATION, WITH LONG-TERM CURRENT USE OF INSULIN: ICD-10-CM

## 2023-08-23 DIAGNOSIS — E11.9 TYPE 2 DIABETES MELLITUS WITHOUT COMPLICATION, WITH LONG-TERM CURRENT USE OF INSULIN: ICD-10-CM

## 2023-08-23 DIAGNOSIS — R31.29 OTHER MICROSCOPIC HEMATURIA: ICD-10-CM

## 2023-08-23 DIAGNOSIS — F41.9 ANXIETY: ICD-10-CM

## 2023-08-23 DIAGNOSIS — N28.89 LEFT KIDNEY MASS: ICD-10-CM

## 2023-08-23 DIAGNOSIS — R10.9 LEFT FLANK PAIN: Primary | ICD-10-CM

## 2023-08-23 LAB
BILIRUB BLD-MCNC: NEGATIVE MG/DL
CLARITY, POC: CLEAR
COLOR UR: YELLOW
EXPIRATION DATE: ABNORMAL
GLUCOSE UR STRIP-MCNC: NEGATIVE MG/DL
KETONES UR QL: NEGATIVE
LEUKOCYTE EST, POC: NEGATIVE
Lab: ABNORMAL
NITRITE UR-MCNC: NEGATIVE MG/ML
PH UR: 6 [PH] (ref 5–8)
PROT UR STRIP-MCNC: NEGATIVE MG/DL
RBC # UR STRIP: ABNORMAL /UL
SP GR UR: 1.01 (ref 1–1.03)
UROBILINOGEN UR QL: NORMAL

## 2023-08-23 PROCEDURE — 1160F RVW MEDS BY RX/DR IN RCRD: CPT | Performed by: NURSE PRACTITIONER

## 2023-08-23 PROCEDURE — 1159F MED LIST DOCD IN RCRD: CPT | Performed by: NURSE PRACTITIONER

## 2023-08-23 PROCEDURE — 3044F HG A1C LEVEL LT 7.0%: CPT | Performed by: NURSE PRACTITIONER

## 2023-08-23 PROCEDURE — 99214 OFFICE O/P EST MOD 30 MIN: CPT | Performed by: NURSE PRACTITIONER

## 2023-08-23 RX ORDER — INSULIN GLARGINE 100 [IU]/ML
10 INJECTION, SOLUTION SUBCUTANEOUS 2 TIMES DAILY
Qty: 3 ML | Refills: 5 | Status: SHIPPED | OUTPATIENT
Start: 2023-08-23 | End: 2023-08-28 | Stop reason: CLARIF

## 2023-08-23 RX ORDER — ACYCLOVIR 400 MG/1
1 TABLET ORAL DAILY
Qty: 1 EACH | Refills: 0 | Status: SHIPPED | OUTPATIENT
Start: 2023-08-23

## 2023-08-23 RX ORDER — ACYCLOVIR 400 MG/1
1 TABLET ORAL
Qty: 3 EACH | Refills: 11 | Status: SHIPPED | OUTPATIENT
Start: 2023-08-23

## 2023-08-23 RX ORDER — HYDROXYZINE HYDROCHLORIDE 10 MG/1
10 TABLET, FILM COATED ORAL EVERY 4 HOURS PRN
Qty: 60 TABLET | Refills: 5 | Status: SHIPPED | OUTPATIENT
Start: 2023-08-23

## 2023-08-23 RX ORDER — INSULIN LISPRO 100 [IU]/ML
INJECTION, SOLUTION INTRAVENOUS; SUBCUTANEOUS
Qty: 500 ML | Refills: 5 | Status: SHIPPED | OUTPATIENT
Start: 2023-08-23 | End: 2023-08-24 | Stop reason: SDUPTHER

## 2023-08-23 NOTE — PROGRESS NOTES
"Chief Complaint   Patient presents with    left flank pain       HPI  Sigrid Bello is a 53 y.o. female presents with left flank pain, urinary frequency, and cloudy urine.  This started 2-3 weeks ago.  She states she has a kidney mass on her left kidney.  The mass has not been checked in about 3 years.  She denies any bloody urine.    The following portions of the patient's history were reviewed and updated as appropriate: allergies, current medications, past family history, past medical history, past social history, past surgical history, and problem list.    Subjective  Review of Systems   Constitutional:  Negative for activity change, appetite change and fatigue.   HENT:  Negative for congestion.    Respiratory:  Negative for cough and shortness of breath.    Cardiovascular:  Negative for chest pain and leg swelling.   Gastrointestinal:  Negative for abdominal pain.   Genitourinary:  Positive for flank pain and frequency. Negative for difficulty urinating, hematuria and urinary incontinence.   Neurological:  Negative for dizziness, weakness and confusion.   Psychiatric/Behavioral:  Negative for behavioral problems and decreased concentration.      Objective  Visit Vitals  /78 (BP Location: Left arm, Patient Position: Sitting)   Pulse 78   Temp 98.1 øF (36.7 øC)   Ht 167.6 cm (66\")   Wt 84.8 kg (187 lb)   SpO2 99%   BMI 30.18 kg/mý        Physical Exam  Vitals and nursing note reviewed.   HENT:      Head: Normocephalic.   Eyes:      Pupils: Pupils are equal, round, and reactive to light.   Pulmonary:      Effort: Pulmonary effort is normal.   Abdominal:      Tenderness: There is no right CVA tenderness or left CVA tenderness.   Skin:     General: Skin is warm and dry.      Capillary Refill: Capillary refill takes less than 2 seconds.   Neurological:      General: No focal deficit present.      Mental Status: She is alert and oriented to person, place, and time.      Gait: Gait is intact. "   Psychiatric:         Attention and Perception: Attention normal.         Mood and Affect: Mood normal.         Behavior: Behavior normal.        Procedures       Results for orders placed or performed in visit on 08/23/23   POC Urinalysis Dipstick, Automated    Specimen: Urine   Result Value Ref Range    Color Yellow Yellow, Straw, Dark Yellow, [place]    Clarity, UA Clear Clear    Specific Gravity  1.010 1.005 - 1.030    pH, Urine 6.0 5.0 - 8.0    Leukocytes Negative Negative    Nitrite, UA Negative Negative    Protein, POC Negative Negative mg/dL    Glucose, UA Negative Negative mg/dL    Ketones, UA Negative Negative    Urobilinogen, UA Normal Normal, 0.2 E.U./dL    Bilirubin Negative Negative    Blood, UA 1+ (A) Negative    Lot Number [id number]     Expiration Date 10/25/2024           Assessment and Plan  Diagnoses and all orders for this visit:    1. Left flank pain (Primary)  -     POC Urinalysis Dipstick, Automated    2. Other microscopic hematuria    3. Left kidney mass  -     US Renal Bilateral; Future    4. Type 2 diabetes mellitus without complication, with long-term current use of insulin  -     Insulin Glargine (BASAGLAR KWIKPEN) 100 UNIT/ML injection pen; Inject 10 Units under the skin into the appropriate area as directed 2 (Two) Times a Day.  Dispense: 3 mL; Refill: 5  -     Discontinue: HumaLOG KwikPen 100 UNIT/ML solution pen-injector; Take 5-10 units TID with meals sliding scale  Dispense: 500 mL; Refill: 5  -     Continuous Blood Gluc  (Dexcom G7 ) device; 1 each Daily.  Dispense: 1 each; Refill: 0  -     Continuous Blood Gluc Sensor (Dexcom G7 Sensor) misc; 1 each Every 10 (Ten) Days.  Dispense: 3 each; Refill: 11    5. Anxiety  -     hydrOXYzine (ATARAX) 10 MG tablet; Take 1 tablet by mouth Every 4 (Four) Hours As Needed for Anxiety.  Dispense: 60 tablet; Refill: 5    Schedule U/S of kidney  + hematuria today  Will likely refer to urology after U/S  Insulin refilled  Hydroxyzine  still works well for anxiety    Return for Next scheduled follow up.        Roverto Israel, APRN

## 2023-08-23 NOTE — TELEPHONE ENCOUNTER
Pharmacy Name:  [facility]    Pharmacy representative name: FARZANA    Pharmacy representative phone number: [phone]     What medication are you calling in regards to: HUMALOG    What question does the pharmacy have: PHARMACY NEEDS THE MAXIMUM DOSAGE PER DAY, CLARIFICATION OF QUANTITY.    Who is the provider that prescribed the medication: ESSIE BAINS

## 2023-08-24 ENCOUNTER — PRIOR AUTHORIZATION (OUTPATIENT)
Dept: INTERNAL MEDICINE | Facility: CLINIC | Age: 53
End: 2023-08-24
Payer: COMMERCIAL

## 2023-08-24 RX ORDER — INSULIN LISPRO 100 [IU]/ML
INJECTION, SOLUTION INTRAVENOUS; SUBCUTANEOUS
Qty: 500 ML | Refills: 5 | Status: SHIPPED | OUTPATIENT
Start: 2023-08-24

## 2023-08-25 ENCOUNTER — TELEPHONE (OUTPATIENT)
Dept: INTERNAL MEDICINE | Facility: CLINIC | Age: 53
End: 2023-08-25
Payer: COMMERCIAL

## 2023-08-25 DIAGNOSIS — Z12.11 SCREENING FOR COLON CANCER: Primary | ICD-10-CM

## 2023-08-25 DIAGNOSIS — R19.5 POSITIVE COLORECTAL CANCER SCREENING USING COLOGUARD TEST: ICD-10-CM

## 2023-08-25 NOTE — TELEPHONE ENCOUNTER
Caller: EugeniaSigrid    Relationship: Self    Best call back number: [phone]     What medications are you currently taking:   Current Outpatient Medications on File Prior to Visit   Medication Sig Dispense Refill    acetaminophen (TYLENOL) 325 MG tablet Take 2 tablets by mouth Every 4 (Four) Hours As Needed for Mild Pain. Take every 4 hours while awake for 7 days then only as needed. 100 tablet 0    albuterol sulfate  (90 Base) MCG/ACT inhaler Inhale 2 puffs Every 4 (Four) Hours As Needed for Wheezing.      aspirin 81 MG EC tablet Take 1 tablet by mouth Daily.      busPIRone (BUSPAR) 5 MG tablet Take 1 tablet by mouth 3 (Three) Times a Day. 90 tablet 2    Continuous Blood Gluc  (Dexcom G7 ) device 1 each Daily. 1 each 0    Continuous Blood Gluc Sensor (Dexcom G6 Sensor) Every 10 (Ten) Days. 3 each 3    Continuous Blood Gluc Sensor (Dexcom G7 Sensor) misc 1 each Every 10 (Ten) Days. 3 each 11    Continuous Blood Gluc Transmit (Dexcom G6 Transmitter) misc 1 Device See Admin Instructions. 1 each 0    diphenhydrAMINE (BENADRYL) 25 MG tablet Take 1 tablet by mouth At Night As Needed for Itching.      DULoxetine (Cymbalta) 30 MG capsule Take 3 capsules by mouth Daily for 90 days. 90 capsule 2    EPINEPHrine 0.1 MG/0.1ML solution auto-injector Inject  as directed.      ferrous sulfate 324 (65 Fe) MG tablet delayed-release EC tablet Take 1 tablet by mouth 2 (Two) Times a Day With Meals. 2 tabs BID      fluticasone-salmeterol (ADVAIR) 250-50 MCG/DOSE DISKUS Inhale 2 puffs As Needed.      Fremanezumab-vfrm (Ajovy) 225 MG/1.5ML solution auto-injector Inject 225 mg under the skin into the appropriate area as directed Every 30 (Thirty) Days. 1.5 mL 11    gabapentin (NEURONTIN) 400 MG capsule Take 1 capsule by mouth 3 (Three) Times a Day.      glucagon (GLUCAGEN) 1 MG injection Inject 1 mg into the appropriate muscle as directed by prescriber.      HumaLOG KwikPen 100 UNIT/ML solution  pen-injector Take 5-10 units TID with meals sliding scale NO MORE THAN 60UNITS DAILY 500 mL 5    hydrOXYzine (ATARAX) 10 MG tablet Take 1 tablet by mouth Every 4 (Four) Hours As Needed for Anxiety. 60 tablet 5    Insulin Glargine (BASAGLAR KWIKPEN) 100 UNIT/ML injection pen Inject 10 Units under the skin into the appropriate area as directed 2 (Two) Times a Day. 3 mL 5    levETIRAcetam (KEPPRA) 500 MG tablet Take 1 tablet by mouth 2 (Two) Times a Day for 90 days. 60 tablet 2    Magnesium 200 MG chewable tablet Chew 1 tablet Daily.      meloxicam (MOBIC) 7.5 MG tablet Take 1 tablet by mouth daily for 7 days. Then take only as needed. 20 tablet 0    metoprolol tartrate (LOPRESSOR) 50 MG tablet Take 1 tablet by mouth 2 (Two) Times a Day. 180 tablet 3    nitroglycerin (NITROSTAT) 0.6 MG SL tablet Place 1 tablet under the tongue Every 5 (Five) Minutes As Needed for Chest Pain.      ondansetron (ZOFRAN) 4 MG tablet Take 1 tablet by mouth Every 8 (Eight) Hours As Needed for Nausea or Vomiting. 30 tablet 0    Potassium Chloride (KLOR-CON 10 PO) Take 1 tablet by mouth 2 (two) times a day.      prazosin (MINIPRESS) 5 MG capsule Take 2 capsules by mouth Every Night for 90 days. 60 capsule 2    sertraline (Zoloft) 50 MG tablet Take 1 tablet by mouth Daily for 90 days. 30 tablet 2    traZODone (DESYREL) 50 MG tablet Take 1-2 tablets PO 30 minutes prior to bedtime 60 tablet 5    ubrogepant (UBRELVY) 100 MG tablet Take 1 tablet by mouth 1 (One) Time As Needed (migraine) for up to 30 doses. 30 tablet 0    Unable to find 1 each As Needed. Med Name: Hemp Gummies      vitamin D (ERGOCALCIFEROL) 1.25 MG (31468 UT) capsule capsule Take 1 capsule by mouth 1 (One) Time Per Week. Saturday's       No current facility-administered medications on file prior to visit.          When did you start taking these medications:     Which medication are you concerned about: Insulin Glargine (BASAGLAR KWIKPEN) 100 UNIT/ML injection pen, HumaLOG KwikPen  100 UNIT/ML solution pen-injector, DEXCOM G7 TRANSMITTER, AND Fremanezumab-vfrm (Ajovy) 225 MG/1.5ML solution auto-injector     Who prescribed you this medication:     What are your concerns: PATIENT STATES THIS FOUR ARE NEEDING PRIOR AUTHORIZATIONS.     How long have you had these concerns:

## 2023-08-28 NOTE — TELEPHONE ENCOUNTER
Basaglar changes to [place]. Attempted to call patient to notify her, no answer no vm. Ok for hub to relay message if pt returns call

## 2023-08-28 NOTE — TELEPHONE ENCOUNTER
Dexcom was approved. Basaglar denied.     Preferred insulins are: : insulin glargine vial, insulin glargine solostar, Levemir, Levemir FlexTouch, Lantus,   Lantus Solostar.

## 2023-08-28 NOTE — TELEPHONE ENCOUNTER
PATIENT IS CALLING BACK. HER PHARMACY HAS NOT RECEIVED THE PA FOR Insulin Glargine (BASAGLAR KWIKPEN) 100 UNIT/ML injection pen .    SHE'S ON HER LAST PEN.

## 2023-09-04 ENCOUNTER — APPOINTMENT (OUTPATIENT)
Dept: GENERAL RADIOLOGY | Facility: HOSPITAL | Age: 53
End: 2023-09-04
Payer: COMMERCIAL

## 2023-09-04 ENCOUNTER — HOSPITAL ENCOUNTER (EMERGENCY)
Facility: HOSPITAL | Age: 53
Discharge: HOME | End: 2023-09-04
Attending: EMERGENCY MEDICINE | Admitting: EMERGENCY MEDICINE
Payer: COMMERCIAL

## 2023-09-04 VITALS
WEIGHT: 180 LBS | RESPIRATION RATE: 16 BRPM | TEMPERATURE: 98 F | DIASTOLIC BLOOD PRESSURE: 64 MMHG | BODY MASS INDEX: 29.99 KG/M2 | HEIGHT: 65 IN | OXYGEN SATURATION: 95 % | HEART RATE: 84 BPM | SYSTOLIC BLOOD PRESSURE: 102 MMHG

## 2023-09-04 DIAGNOSIS — N39.0 ACUTE UTI: ICD-10-CM

## 2023-09-04 DIAGNOSIS — M54.50 ACUTE MIDLINE LOW BACK PAIN WITHOUT SCIATICA: Primary | ICD-10-CM

## 2023-09-04 DIAGNOSIS — N28.9 RENAL INSUFFICIENCY: ICD-10-CM

## 2023-09-04 LAB
ALBUMIN SERPL-MCNC: 4.1 G/DL (ref 3.5–5.2)
ALBUMIN/GLOB SERPL: 1.2 G/DL
ALP SERPL-CCNC: 79 U/L (ref 39–117)
ALT SERPL W P-5'-P-CCNC: 9 U/L (ref 1–33)
ANION GAP SERPL CALCULATED.3IONS-SCNC: 12 MMOL/L (ref 5–15)
AST SERPL-CCNC: 15 U/L (ref 1–32)
BACTERIA UR QL AUTO: ABNORMAL /HPF
BASOPHILS # BLD AUTO: 0.02 10*3/MM3 (ref 0–0.2)
BASOPHILS NFR BLD AUTO: 0.3 % (ref 0–1.5)
BILIRUB SERPL-MCNC: 0.2 MG/DL (ref 0–1.2)
BILIRUB UR QL STRIP: NEGATIVE
BUN SERPL-MCNC: 20 MG/DL (ref 6–20)
BUN/CREAT SERPL: 19 (ref 7–25)
CALCIUM SPEC-SCNC: 9.7 MG/DL (ref 8.6–10.5)
CHLORIDE SERPL-SCNC: 105 MMOL/L (ref 98–107)
CLARITY UR: ABNORMAL
CO2 SERPL-SCNC: 23 MMOL/L (ref 22–29)
COLOR UR: YELLOW
CREAT SERPL-MCNC: 1.05 MG/DL (ref 0.57–1)
DEPRECATED RDW RBC AUTO: 46.1 FL (ref 37–54)
EGFRCR SERPLBLD CKD-EPI 2021: 64.5 ML/MIN/1.73
EOSINOPHIL # BLD AUTO: 0.06 10*3/MM3 (ref 0–0.4)
EOSINOPHIL NFR BLD AUTO: 0.9 % (ref 0.3–6.2)
ERYTHROCYTE [DISTWIDTH] IN BLOOD BY AUTOMATED COUNT: 14.3 % (ref 12.3–15.4)
GLOBULIN UR ELPH-MCNC: 3.3 GM/DL
GLUCOSE SERPL-MCNC: 119 MG/DL (ref 65–99)
GLUCOSE UR STRIP-MCNC: NEGATIVE MG/DL
HCT VFR BLD AUTO: 40.1 % (ref 34–46.6)
HGB BLD-MCNC: 13.2 G/DL (ref 12–15.9)
HGB UR QL STRIP.AUTO: ABNORMAL
HYALINE CASTS UR QL AUTO: ABNORMAL /LPF
IMM GRANULOCYTES # BLD AUTO: 0.02 10*3/MM3 (ref 0–0.05)
IMM GRANULOCYTES NFR BLD AUTO: 0.3 % (ref 0–0.5)
KETONES UR QL STRIP: NEGATIVE
LEUKOCYTE ESTERASE UR QL STRIP.AUTO: ABNORMAL
LYMPHOCYTES # BLD AUTO: 2.49 10*3/MM3 (ref 0.7–3.1)
LYMPHOCYTES NFR BLD AUTO: 36.1 % (ref 19.6–45.3)
MCH RBC QN AUTO: 29.2 PG (ref 26.6–33)
MCHC RBC AUTO-ENTMCNC: 32.9 G/DL (ref 31.5–35.7)
MCV RBC AUTO: 88.7 FL (ref 79–97)
MONOCYTES # BLD AUTO: 0.34 10*3/MM3 (ref 0.1–0.9)
MONOCYTES NFR BLD AUTO: 4.9 % (ref 5–12)
NEUTROPHILS NFR BLD AUTO: 3.97 10*3/MM3 (ref 1.7–7)
NEUTROPHILS NFR BLD AUTO: 57.5 % (ref 42.7–76)
NITRITE UR QL STRIP: NEGATIVE
NRBC BLD AUTO-RTO: 0 /100 WBC (ref 0–0.2)
PH UR STRIP.AUTO: <=5 [PH] (ref 5–8)
PLATELET # BLD AUTO: 226 10*3/MM3 (ref 140–450)
PMV BLD AUTO: 8.7 FL (ref 6–12)
POTASSIUM SERPL-SCNC: 4 MMOL/L (ref 3.5–5.2)
PROT SERPL-MCNC: 7.4 G/DL (ref 6–8.5)
PROT UR QL STRIP: NEGATIVE
RBC # BLD AUTO: 4.52 10*6/MM3 (ref 3.77–5.28)
RBC # UR STRIP: ABNORMAL /HPF
REF LAB TEST METHOD: ABNORMAL
SODIUM SERPL-SCNC: 140 MMOL/L (ref 136–145)
SP GR UR STRIP: 1.03 (ref 1–1.03)
SQUAMOUS #/AREA URNS HPF: ABNORMAL /HPF
UROBILINOGEN UR QL STRIP: ABNORMAL
WBC # UR STRIP: ABNORMAL /HPF
WBC NRBC COR # BLD: 6.9 10*3/MM3 (ref 3.4–10.8)

## 2023-09-04 PROCEDURE — 87086 URINE CULTURE/COLONY COUNT: CPT | Performed by: EMERGENCY MEDICINE

## 2023-09-04 PROCEDURE — 80053 COMPREHEN METABOLIC PANEL: CPT | Performed by: EMERGENCY MEDICINE

## 2023-09-04 PROCEDURE — 81001 URINALYSIS AUTO W/SCOPE: CPT | Performed by: EMERGENCY MEDICINE

## 2023-09-04 PROCEDURE — 36415 COLL VENOUS BLD VENIPUNCTURE: CPT

## 2023-09-04 PROCEDURE — 99283 EMERGENCY DEPT VISIT LOW MDM: CPT

## 2023-09-04 PROCEDURE — 85025 COMPLETE CBC W/AUTO DIFF WBC: CPT | Performed by: EMERGENCY MEDICINE

## 2023-09-04 PROCEDURE — 72100 X-RAY EXAM L-S SPINE 2/3 VWS: CPT

## 2023-09-04 RX ORDER — CYCLOBENZAPRINE HCL 10 MG
10 TABLET ORAL 3 TIMES DAILY PRN
Qty: 12 TABLET | Refills: 0 | Status: SHIPPED | OUTPATIENT
Start: 2023-09-04

## 2023-09-04 RX ORDER — CYCLOBENZAPRINE HCL 10 MG
10 TABLET ORAL ONCE
Status: COMPLETED | OUTPATIENT
Start: 2023-09-04 | End: 2023-09-04

## 2023-09-04 RX ORDER — HYDROMORPHONE HYDROCHLORIDE 1 MG/ML
0.5 INJECTION, SOLUTION INTRAMUSCULAR; INTRAVENOUS; SUBCUTANEOUS
Status: DISCONTINUED | OUTPATIENT
Start: 2023-09-04 | End: 2023-09-04 | Stop reason: HOSPADM

## 2023-09-04 RX ORDER — OXYCODONE AND ACETAMINOPHEN 7.5; 325 MG/1; MG/1
1 TABLET ORAL ONCE
Status: COMPLETED | OUTPATIENT
Start: 2023-09-04 | End: 2023-09-04

## 2023-09-04 RX ORDER — KETOROLAC TROMETHAMINE 15 MG/ML
15 INJECTION, SOLUTION INTRAMUSCULAR; INTRAVENOUS ONCE
Status: DISCONTINUED | OUTPATIENT
Start: 2023-09-04 | End: 2023-09-04 | Stop reason: HOSPADM

## 2023-09-04 RX ORDER — NITROFURANTOIN 25; 75 MG/1; MG/1
100 CAPSULE ORAL 2 TIMES DAILY
Qty: 10 CAPSULE | Refills: 0 | Status: SHIPPED | OUTPATIENT
Start: 2023-09-04 | End: 2023-09-09

## 2023-09-04 RX ORDER — IBUPROFEN 600 MG/1
600 TABLET ORAL 3 TIMES DAILY
Qty: 15 TABLET | Refills: 0 | Status: SHIPPED | OUTPATIENT
Start: 2023-09-04

## 2023-09-04 RX ADMIN — CYCLOBENZAPRINE 10 MG: 10 TABLET, FILM COATED ORAL at 15:09

## 2023-09-04 RX ADMIN — OXYCODONE HYDROCHLORIDE AND ACETAMINOPHEN 1 TABLET: 7.5; 325 TABLET ORAL at 15:09

## 2023-09-04 NOTE — ED PROVIDER NOTES
Subjective   History of Present Illness  Patient is a pleasant 53-year-old female who presents to the emergency department low back pain.  She has a history of fibromyalgia and intermittent back pain in the past.  She says that 1 week ago she was bending over at her house and heard a pop in her lower back.  She had significant pain since that time.  She called her telemedicine doctor this week and ultimately given the pain that she had with bending over he had told her to come to the emergency department for evaluation.      She also notes that her blood sugar has been more difficult to control throughout the week.      She denies lower extremity numbness or weakness.  Denies loss of bowel or bladder control.  Denies numbness in her groin area.  Denies other acute complaints.  Denies fever, chills, chest pain, shortness of breath, abdominal pain, vomiting, or diarrhea.    Review of Systems   All other systems reviewed and are negative.    Past Medical History:   Diagnosis Date    ADHD (attention deficit hyperactivity disorder)     Anemia     Anxiety and depression     Arthritis     Asthma     Atrial fibrillation     Blastomycosis     Diabetes mellitus     Fibromyalgia     HL (hearing loss)     Hyperlipidemia     Hypertension     Hypokalemia     Low back pain     Lupus     Migraine     MVP (mitral valve prolapse)     Obesity     PTSD (post-traumatic stress disorder)     Seizures     Last seizure March 2023    Sleep apnea     No CPAP    Tachycardia     Uterine mass        Allergies   Allergen Reactions    Meperidine Anaphylaxis and Unknown (See Comments)     unknown    Morphine Shortness Of Breath and Unknown (See Comments)    Mushroom Anaphylaxis    Peanut Oil Anaphylaxis    Penicillins Unknown (See Comments) and Anaphylaxis     Childhood reaction    Iodine Hives    Tree Nut Hives    Cortisone Hives and Rash    Other Hives     Mayonnaise        Past Surgical History:   Procedure Laterality Date    ANKLE SURGERY       both ankles rebuilt    INNER EAR SURGERY      KNEE SURGERY      TEETH EXTRACTION      TOTAL LAPAROSCOPIC HYSTERECTOMY SALPINGECTOMY N/A 7/20/2023    Procedure: TOTAL LAPAROSCOPIC HYSTERECTOMY BILATERAL SALPINGECTOMY WITH DAVINCI ROBOT;  Surgeon: Monisha Greene MD;  Location:  [facility];  Service: Robotics - DaVinci;  Laterality: N/A;    UVULECTOMY      VAGINAL REPAIR N/A 7/20/2023    Procedure: VAGINAL SIDEWALL LACERATION REPAIR LEFT;  Surgeon: Monisha Greene MD;  Location:  [facility];  Service: Obstetrics/Gynecology;  Laterality: N/A;       Family History   Problem Relation Age of Onset    Anxiety disorder Father     Breast cancer Maternal Grandmother     Asthma Paternal Grandfather     Cancer Paternal Grandfather         Mike    Depression Paternal Grandfather     Diabetes Paternal Grandfather     Hearing loss Paternal Grandfather     Ovarian cancer Neg Hx        Social History     Socioeconomic History    Marital status: Single   Tobacco Use    Smoking status: Never    Smokeless tobacco: Never   Vaping Use    Vaping Use: Never used   Substance and Sexual Activity    Alcohol use: Not Currently    Drug use: No    Sexual activity: Not Currently     Partners: Male     Birth control/protection: None           Objective   Physical Exam  Vitals and nursing note reviewed.   Constitutional:       General: She is not in acute distress.  HENT:      Head: Normocephalic and atraumatic.   Eyes:      Conjunctiva/sclera: Conjunctivae normal.      Pupils: Pupils are equal, round, and reactive to light.   Neck:      Thyroid: No thyromegaly.   Cardiovascular:      Rate and Rhythm: Normal rate and regular rhythm.      Heart sounds: Normal heart sounds. No murmur heard.    No friction rub. No gallop.   Pulmonary:      Effort: Pulmonary effort is normal. No respiratory distress.      Breath sounds: Normal breath sounds.   Abdominal:      General: Bowel sounds are normal.      Palpations: Abdomen is soft.      Tenderness: There  is no abdominal tenderness.   Musculoskeletal:         General: Tenderness: Generalized tenderness throughout the lumbar spine along with the paraspinal musculature.  No focal vertebral tenderness to palpation..      Cervical back: Normal range of motion and neck supple.   Lymphadenopathy:      Cervical: No cervical adenopathy.   Skin:     General: Skin is warm and dry.      Comments: Diffuse scarring over her arms and legs consistent with chronic skin picking   Neurological:      Mental Status: She is alert and oriented to person, place, and time.   Psychiatric:         Mood and Affect: Mood is anxious.         Behavior: Behavior normal.         Thought Content: Thought content normal.       Procedures           ED Course      Recent Results (from the past 24 hour(s))   Comprehensive Metabolic Panel    Collection Time: 09/04/23  1:25 PM    Specimen: Blood   Result Value Ref Range    Glucose 119 (H) 65 - 99 mg/dL    BUN 20 6 - 20 mg/dL    Creatinine 1.05 (H) 0.57 - 1.00 mg/dL    Sodium 140 136 - 145 mmol/L    Potassium 4.0 3.5 - 5.2 mmol/L    Chloride 105 98 - 107 mmol/L    CO2 23.0 22.0 - 29.0 mmol/L    Calcium 9.7 8.6 - 10.5 mg/dL    Total Protein 7.4 6.0 - 8.5 g/dL    Albumin 4.1 3.5 - 5.2 g/dL    ALT (SGPT) 9 1 - 33 U/L    AST (SGOT) 15 1 - 32 U/L    Alkaline Phosphatase 79 39 - 117 U/L    Total Bilirubin 0.2 0.0 - 1.2 mg/dL    Globulin 3.3 gm/dL    A/G Ratio 1.2 g/dL    BUN/Creatinine Ratio 19.0 7.0 - 25.0    Anion Gap 12.0 5.0 - 15.0 mmol/L    eGFR 64.5 >60.0 mL/min/1.73   CBC Auto Differential    Collection Time: 09/04/23  1:25 PM    Specimen: Blood   Result Value Ref Range    WBC 6.90 3.40 - 10.80 10*3/mm3    RBC 4.52 3.77 - 5.28 10*6/mm3    Hemoglobin 13.2 12.0 - 15.9 g/dL    Hematocrit 40.1 34.0 - 46.6 %    MCV 88.7 79.0 - 97.0 fL    MCH 29.2 26.6 - 33.0 pg    MCHC 32.9 31.5 - 35.7 g/dL    RDW 14.3 12.3 - 15.4 %    RDW-SD 46.1 37.0 - 54.0 fl    MPV 8.7 6.0 - 12.0 fL    Platelets 226 140 - 450 10*3/mm3     Neutrophil % 57.5 42.7 - 76.0 %    Lymphocyte % 36.1 19.6 - 45.3 %    Monocyte % 4.9 (L) 5.0 - 12.0 %    Eosinophil % 0.9 0.3 - 6.2 %    Basophil % 0.3 0.0 - 1.5 %    Immature Grans % 0.3 0.0 - 0.5 %    Neutrophils, Absolute 3.97 1.70 - 7.00 10*3/mm3    Lymphocytes, Absolute 2.49 0.70 - 3.10 10*3/mm3    Monocytes, Absolute 0.34 0.10 - 0.90 10*3/mm3    Eosinophils, Absolute 0.06 0.00 - 0.40 10*3/mm3    Basophils, Absolute 0.02 0.00 - 0.20 10*3/mm3    Immature Grans, Absolute 0.02 0.00 - 0.05 10*3/mm3    nRBC 0.0 0.0 - 0.2 /100 WBC   Urinalysis With Microscopic If Indicated (No Culture) - Urine, Clean Catch    Collection Time: 09/04/23  1:27 PM    Specimen: Urine, Clean Catch   Result Value Ref Range    Color, UA Yellow Yellow, Straw    Appearance, UA Cloudy (A) Clear    pH, UA <=5.0 5.0 - 8.0    Specific Gravity, UA 1.026 1.001 - 1.030    Glucose, UA Negative Negative    Ketones, UA Negative Negative    Bilirubin, UA Negative Negative    Blood, UA Small (1+) (A) Negative    Protein, UA Negative Negative    Leuk Esterase, UA Moderate (2+) (A) Negative    Nitrite, UA Negative Negative    Urobilinogen, UA 0.2 E.U./dL 0.2 - 1.0 E.U./dL   Urinalysis, Microscopic Only - Urine, Clean Catch    Collection Time: 09/04/23  1:27 PM    Specimen: Urine, Clean Catch   Result Value Ref Range    RBC, UA 0-2 None Seen, 0-2 /HPF    WBC, UA Too Numerous to Count (A) None Seen, 0-2 /HPF    Bacteria, UA 1+ (A) None Seen, Trace /HPF    Squamous Epithelial Cells, UA 3-6 (A) None Seen, 0-2 /HPF    Hyaline Casts, UA 7-12 0 - 6 /LPF    Methodology Automated Microscopy      Note: In addition to lab results from this visit, the labs listed above may include labs taken at another facility or during a different encounter within the last 24 hours. Please correlate lab times with ED admission and discharge times for further clarification of the services performed during this visit.    XR Spine Lumbar 2 or 3 View   Final Result   Impression:  "  Unremarkable lumbar spine         Electronically Signed: Brian Arboleda MD     9/4/2023 1:03 PM CDT     Workstation ID: [id number]        Vitals:    09/04/23 1234 09/04/23 1400 09/04/23 1500   BP: 133/71 98/68 102/64   BP Location: Right arm     Patient Position: Sitting     Pulse: (!) 123 96 84   Resp: 16     Temp: 98 °F (36.7 °C)     TempSrc: Oral     SpO2: 96% 95% 95%   Weight: 81.6 kg (180 lb)     Height: 165.1 cm (65\")       Medications   ketorolac (TORADOL) injection 15 mg (15 mg Intravenous Not Given 9/4/23 1509)   HYDROmorphone (DILAUDID) injection 0.5 mg (has no administration in time range)   cyclobenzaprine (FLEXERIL) tablet 10 mg (10 mg Oral Given 9/4/23 1509)   oxyCODONE-acetaminophen (PERCOCET) 7.5-325 MG per tablet 1 tablet (1 tablet Oral Given 9/4/23 1509)     ECG/EMG Results (last 24 hours)       ** No results found for the last 24 hours. **          No orders to display                                            Medical Decision Making  Patient feels better following treatment.  Both she and her significant other are both requesting discharge at this time.  She prefers oral as opposed to IV pain medication here in the emergency department.  I will give her Flexeril and ibuprofen for pain control.  Patient understands to have a low threshold to return to the emergency department if symptoms persist, worsen, or other concerns arise.    Problems Addressed:  Acute midline low back pain without sciatica: complicated acute illness or injury  Acute UTI: complicated acute illness or injury  Renal insufficiency: complicated acute illness or injury    Amount and/or Complexity of Data Reviewed  External Data Reviewed: notes.  Labs: ordered. Decision-making details documented in ED Course.  Radiology: ordered and independent interpretation performed. Decision-making details documented in ED Course.    Risk  Prescription drug management.        Final diagnoses:   Acute midline low back pain without sciatica "   Acute UTI   Renal insufficiency       ED Disposition  ED Disposition       ED Disposition   Discharge    Condition   Stable    Comment   --           DISCHARGE    Patient discharged in stable condition.    Reviewed implications of results, diagnosis, meds, responsibility to follow up, warning signs and symptoms of possible worsening, potential complications and reasons to return to ER.    Patient/Family voiced understanding of above instructions.    Discussed plan for discharge, as there is no emergent indication for admission.  Pt/family is agreeable and understands need for follow up and possible repeat testing.  Pt/family is aware that discharge does not mean that nothing is wrong but that it indicates no emergency is currently present that requires admission and they must continue care with follow-up as given below or with a physician of their choice.     FOLLOW-UP  Roverto Israel, APRN  [address] [facility] [address]  [address]  [place] [address]  [phone]    Schedule an appointment as soon as possible for a visit       [facility] EMERGENCY DEPARTMENT  [address] [place] [id number] [phone]    If symptoms worsen         Medication List        New Prescriptions      cyclobenzaprine 10 MG tablet  Commonly known as: FLEXERIL  Take 1 tablet by mouth 3 (Three) Times a Day As Needed for Muscle Spasms.     ibuprofen 600 MG tablet  Commonly known as: ADVIL,MOTRIN  Take 1 tablet by mouth 3 (Three) Times a Day.     nitrofurantoin (macrocrystal-monohydrate) 100 MG capsule  Commonly known as: MACROBID  Take 1 capsule by mouth 2 (Two) Times a Day for 5 days.               Where to Get Your Medications        These medications were sent to [facility] [address] [place] [address] - [address] [facility] - [phone]  - [phone]   [address] [place] [id number]      Phone: [phone]   cyclobenzaprine 10 MG tablet  ibuprofen 600 MG tablet  nitrofurantoin  (macrocrystal-monohydrate) 100 MG capsule              Erickson Sepulveda,   09/04/23 1622       Erickson Sepulveda,   09/04/23 1633

## 2023-09-05 ENCOUNTER — TELEPHONE (OUTPATIENT)
Dept: INTERNAL MEDICINE | Facility: CLINIC | Age: 53
End: 2023-09-05
Payer: COMMERCIAL

## 2023-09-05 ENCOUNTER — OFFICE VISIT (OUTPATIENT)
Dept: INTERNAL MEDICINE | Facility: CLINIC | Age: 53
End: 2023-09-05
Payer: COMMERCIAL

## 2023-09-05 VITALS
BODY MASS INDEX: 29.25 KG/M2 | DIASTOLIC BLOOD PRESSURE: 78 MMHG | OXYGEN SATURATION: 99 % | TEMPERATURE: 98.1 F | HEART RATE: 78 BPM | SYSTOLIC BLOOD PRESSURE: 110 MMHG | WEIGHT: 182 LBS | HEIGHT: 66 IN

## 2023-09-05 DIAGNOSIS — M54.50 ACUTE BILATERAL LOW BACK PAIN WITHOUT SCIATICA: ICD-10-CM

## 2023-09-05 DIAGNOSIS — N28.89 LEFT KIDNEY MASS: Primary | ICD-10-CM

## 2023-09-05 LAB — BACTERIA SPEC AEROBE CULT: NORMAL

## 2023-09-05 PROCEDURE — 1159F MED LIST DOCD IN RCRD: CPT | Performed by: NURSE PRACTITIONER

## 2023-09-05 PROCEDURE — 3044F HG A1C LEVEL LT 7.0%: CPT | Performed by: NURSE PRACTITIONER

## 2023-09-05 PROCEDURE — 99213 OFFICE O/P EST LOW 20 MIN: CPT | Performed by: NURSE PRACTITIONER

## 2023-09-05 PROCEDURE — 1160F RVW MEDS BY RX/DR IN RCRD: CPT | Performed by: NURSE PRACTITIONER

## 2023-09-05 NOTE — PROGRESS NOTES
"Chief Complaint  Follow-up and Back Pain    Subjective      History of Present Illness  Sigrid is a 53 y.o. female who presents to the clinic today for follow up of low back problems. Current symptoms include: low back pain. Symptoms have worsened from the previous visit. Exacerbating factors identified by the patient are none.  She was seen in the ER yesterday for low back pain. She says a week ago she bet over to pick something up and heard a pop in the low back. She was diagnosed with a UTI and treated with Macrobid, with a slight elevation of creatinine. Lumbar spine xray was unremarkable. She was given 1 oxycodone prior to discharge and sent home with ibuprofen and cyclobenzaprine prescriptions. She continues to have severe pain. She has taken 1 cyclobenzaprine, said it was not effective. She is taking ibuprofen TID.     The following portions of the patient's history were reviewed and updated as appropriate: allergies, current medications, past family history, past medical history, past social history, past surgical history, and problem list.       Review of Systems  Pertinent items are noted in HPI.      Objective   Vital Signs:  /78 (BP Location: Left arm, Patient Position: Sitting)   Pulse 78   Temp 98.1 °F (36.7 °C)   Ht 167.6 cm (66\")   Wt 82.6 kg (182 lb)   SpO2 99%   BMI 29.38 kg/m²   Estimated body mass index is 29.38 kg/m² as calculated from the following:    Height as of this encounter: 167.6 cm (66\").    Weight as of this encounter: 82.6 kg (182 lb).            Physical Exam  Vitals reviewed.   Constitutional:       General: She is not in acute distress.  HENT:      Head: Normocephalic and atraumatic.   Cardiovascular:      Rate and Rhythm: Normal rate and regular rhythm.      Pulses: Normal pulses.      Heart sounds: Normal heart sounds.   Pulmonary:      Effort: Pulmonary effort is normal.      Breath sounds: Normal breath sounds.   Musculoskeletal:      Lumbar back: Tenderness " (paraspinal to light touch) present. Decreased range of motion.   Skin:     General: Skin is warm and dry.      Comments: Scattered scabs and scarring on extremities    Neurological:      General: No focal deficit present.      Mental Status: She is alert.   Psychiatric:         Attention and Perception: Attention normal.         Mood and Affect: Mood is anxious.         Behavior: Behavior normal.         Thought Content: Thought content normal.         Judgment: Judgment normal.        Result Review   The following data was reviewed by: TAWNY Nava on 09/05/2023:  CMP          5/18/2023    16:34 7/12/2023    08:58 9/4/2023    13:25   CMP   Glucose 141   119    BUN 15   20    Creatinine 0.90   1.05    EGFR 78.0   64.5    Sodium 142   140    Potassium 3.9  4.1  4.0    Chloride 107   105    Calcium 9.3   9.7    Total Protein 7.1   7.4    Albumin 3.9   4.1    Globulin 3.2   3.3    Total Bilirubin 0.2   0.2    Alkaline Phosphatase 70   79    AST (SGOT) 13   15    ALT (SGPT) 7   9    Albumin/Globulin Ratio 1.2   1.2    BUN/Creatinine Ratio 16.7   19.0    Anion Gap 9.0   12.0      CBC w/diff          7/12/2023    08:46 7/21/2023    08:28 9/4/2023    13:25   CBC w/Diff   WBC 7.97  7.88  6.90    RBC 4.49  3.99  4.52    Hemoglobin 12.7  11.4  13.2    Hematocrit 38.4  35.6  40.1    MCV 85.5  89.2  88.7    MCH 28.3  28.6  29.2    MCHC 33.1  32.0  32.9    RDW 13.6  14.1  14.3    Platelets 301  129  226    Neutrophil Rel % 52.4   57.5    Immature Granulocyte Rel % 0.4   0.3    Lymphocyte Rel % 38.9   36.1    Monocyte Rel % 5.9   4.9    Eosinophil Rel % 1.9   0.9    Basophil Rel % 0.5   0.3      Data reviewed : Recent hospitalization notes [facility]             Assessment and Plan  Diagnoses and all orders for this visit:    1. Left kidney mass (Primary)  Assessment & Plan:  She has a known mass on her left kidney with stent placement.  She said this pain is the same pain she had when she found out she had a  mass.  Ultrasound of the kidney is pending. Encouraged to call scheduling.  Refer to nephrology.  Follow-up as needed.    Orders:  -     Ambulatory Referral to Nephrology    2. Acute bilateral low back pain without sciatica  Assessment & Plan:  Ice to affected area as needed for local pain relief.  Heat to affected area as needed for local pain relief.  Continue ibuprofen and complete Macrobid.  Consider PT if symptoms do not improve.                          Follow Up  Return if symptoms worsen or fail to improve.  Patient was given instructions and counseling regarding her condition or for health maintenance advice. Please see specific information pulled into the AVS if appropriate.    Part of this note may be an electronic transcription/translation of spoken language to printed text using the Dragon Dictation System.

## 2023-09-05 NOTE — TELEPHONE ENCOUNTER
----- Message from Aide Hernandez sent at 9/5/2023  9:21 AM EDT -----    ----- Message -----  From: Valente Jung PA-C  Sent: 9/5/2023   9:20 AM EDT  To: Aide Hernandez    She has a UTI.  It looks like somebody sent in Macrobid for her yesterday.  Is she taking the antibiotic?  Also, she will need to come in for urine culture.  If agreeable I can place the order for her.  ----- Message -----  From: Mary Aide  Sent: 9/5/2023   9:16 AM EDT  To: Valente Jung PA-C    Can you review for Roverto?  ----- Message -----  From: Lab, Background User  Sent: 9/4/2023   1:34 PM EDT  To: TAWNY Gallo

## 2023-09-06 PROBLEM — M54.50 ACUTE BILATERAL LOW BACK PAIN WITHOUT SCIATICA: Status: ACTIVE | Noted: 2023-09-06

## 2023-09-06 NOTE — ASSESSMENT & PLAN NOTE
She has a known mass on her left kidney with stent placement.  She said this pain is the same pain she had when she found out she had a mass.  Ultrasound of the kidney is pending. Encouraged to call scheduling.  Refer to nephrology.  Follow-up as needed.

## 2023-09-06 NOTE — ASSESSMENT & PLAN NOTE
Ice to affected area as needed for local pain relief.  Heat to affected area as needed for local pain relief.  Continue ibuprofen and complete Macrobid.  Consider PT if symptoms do not improve.

## 2023-09-11 ENCOUNTER — TELEMEDICINE (OUTPATIENT)
Dept: INTERNAL MEDICINE | Facility: CLINIC | Age: 53
End: 2023-09-11
Payer: COMMERCIAL

## 2023-09-11 DIAGNOSIS — G43.009 MIGRAINE WITHOUT AURA AND WITHOUT STATUS MIGRAINOSUS, NOT INTRACTABLE: ICD-10-CM

## 2023-09-11 DIAGNOSIS — R42 DIZZINESS: Primary | ICD-10-CM

## 2023-09-11 PROCEDURE — 99213 OFFICE O/P EST LOW 20 MIN: CPT | Performed by: NURSE PRACTITIONER

## 2023-09-11 PROCEDURE — 3044F HG A1C LEVEL LT 7.0%: CPT | Performed by: NURSE PRACTITIONER

## 2023-09-11 PROCEDURE — 1160F RVW MEDS BY RX/DR IN RCRD: CPT | Performed by: NURSE PRACTITIONER

## 2023-09-11 PROCEDURE — 1159F MED LIST DOCD IN RCRD: CPT | Performed by: NURSE PRACTITIONER

## 2023-09-11 NOTE — PROGRESS NOTES
Subjective   Chief Complaint   Patient presents with    Dizziness      This is video visit.  You have chosen to receive care through a video visit today. Do you consent to use a video visit for your medical care today? Yes    Sigrid Bello is a 53 y.o. female here today for dizziness.  This has been ongoing for about 2 weeks.  She had a hysterectomy about 3 weeks ago.  No headaches.  The symptoms mostly occur when changing positions.  Has been checking her BP at his has been normal.  Drinking plenty of water.       I have reviewed the following portions of the patient's history and confirmed they are accurate: allergies, current medications, past family history, past medical history, past social history, past surgical history, and problem list    I have personally completed the patient's review of systems.    Review of Systems   Constitutional:  Negative for activity change, appetite change and fatigue.   HENT:  Negative for congestion.    Respiratory:  Negative for cough and shortness of breath.    Cardiovascular:  Negative for chest pain and leg swelling.   Gastrointestinal:  Negative for abdominal pain.   Neurological:  Positive for dizziness. Negative for weakness, headache and confusion.   Psychiatric/Behavioral:  Negative for behavioral problems and decreased concentration.      Current Outpatient Medications on File Prior to Visit   Medication Sig    acetaminophen (TYLENOL) 325 MG tablet Take 2 tablets by mouth Every 4 (Four) Hours As Needed for Mild Pain. Take every 4 hours while awake for 7 days then only as needed.    albuterol sulfate  (90 Base) MCG/ACT inhaler Inhale 2 puffs Every 4 (Four) Hours As Needed for Wheezing.    aspirin 81 MG EC tablet Take 1 tablet by mouth Daily.    busPIRone (BUSPAR) 5 MG tablet Take 1 tablet by mouth 3 (Three) Times a Day.    Continuous Blood Gluc  (Dexcom G7 ) device 1 each Daily.    Continuous Blood Gluc Sensor (Dexcom G6 Sensor) Every 10  (Ten) Days.    Continuous Blood Gluc Sensor (Dexcom G7 Sensor) misc 1 each Every 10 (Ten) Days.    Continuous Blood Gluc Transmit (Dexcom G6 Transmitter) misc 1 Device See Admin Instructions.    cyclobenzaprine (FLEXERIL) 10 MG tablet Take 1 tablet by mouth 3 (Three) Times a Day As Needed for Muscle Spasms.    diphenhydrAMINE (BENADRYL) 25 MG tablet Take 1 tablet by mouth At Night As Needed for Itching.    DULoxetine (Cymbalta) 30 MG capsule Take 3 capsules by mouth Daily for 90 days.    EPINEPHrine 0.1 MG/0.1ML solution auto-injector Inject  as directed.    ferrous sulfate 324 (65 Fe) MG tablet delayed-release EC tablet Take 1 tablet by mouth 2 (Two) Times a Day With Meals. 2 tabs BID    fluticasone-salmeterol (ADVAIR) 250-50 MCG/DOSE DISKUS Inhale 2 puffs As Needed.    Fremanezumab-vfrm (Ajovy) 225 MG/1.5ML solution auto-injector Inject 225 mg under the skin into the appropriate area as directed Every 30 (Thirty) Days.    gabapentin (NEURONTIN) 400 MG capsule Take 1 capsule by mouth 3 (Three) Times a Day.    glucagon (GLUCAGEN) 1 MG injection Inject 1 mg into the appropriate muscle as directed by prescriber.    HumaLOG KwikPen 100 UNIT/ML solution pen-injector Take 5-10 units TID with meals sliding scale NO MORE THAN 60UNITS DAILY    hydrOXYzine (ATARAX) 10 MG tablet Take 1 tablet by mouth Every 4 (Four) Hours As Needed for Anxiety.    ibuprofen (ADVIL,MOTRIN) 600 MG tablet Take 1 tablet by mouth 3 (Three) Times a Day.    Insulin Glargine (LANTUS SOLOSTAR) 100 UNIT/ML injection pen Inject 10 Units under the skin into the appropriate area as directed 2 (Two) Times a Day.    levETIRAcetam (KEPPRA) 500 MG tablet Take 1 tablet by mouth 2 (Two) Times a Day for 90 days.    Magnesium 200 MG chewable tablet Chew 1 tablet Daily.    metoprolol tartrate (LOPRESSOR) 50 MG tablet Take 1 tablet by mouth 2 (Two) Times a Day.    nitroglycerin (NITROSTAT) 0.6 MG SL tablet Place 1 tablet under the tongue Every 5 (Five) Minutes As  Needed for Chest Pain.    ondansetron (ZOFRAN) 4 MG tablet Take 1 tablet by mouth Every 8 (Eight) Hours As Needed for Nausea or Vomiting.    Potassium Chloride (KLOR-CON 10 PO) Take 1 tablet by mouth 2 (two) times a day.    prazosin (MINIPRESS) 5 MG capsule Take 2 capsules by mouth Every Night for 90 days.    sertraline (Zoloft) 50 MG tablet Take 1 tablet by mouth Daily for 90 days.    traZODone (DESYREL) 50 MG tablet Take 1-2 tablets PO 30 minutes prior to bedtime    ubrogepant (UBRELVY) 100 MG tablet Take 1 tablet by mouth 1 (One) Time As Needed (migraine) for up to 30 doses.    Unable to find 1 each As Needed. Med Name: Hemp Gummies    vitamin D (ERGOCALCIFEROL) 1.25 MG (93870 UT) capsule capsule Take 1 capsule by mouth 1 (One) Time Per Week. Saturday's    [DISCONTINUED] ubrogepant (UBRELVY) 100 MG tablet Take 1 tablet by mouth 1 (One) Time As Needed (migraine) for up to 30 doses.     No current facility-administered medications on file prior to visit.       Objective   There were no vitals filed for this visit.  There is no height or weight on file to calculate BMI.    Physical Exam  Constitutional:       Appearance: Normal appearance.   Pulmonary:      Effort: No respiratory distress ( Speaking full sentences without difficulty).   Musculoskeletal:      Comments: Pt laying in bed   Neurological:      General: No focal deficit present.      Mental Status: She is alert and oriented to person, place, and time. Mental status is at baseline.   Psychiatric:         Mood and Affect: Mood normal.       Assessment & Plan   Problem List Items Addressed This Visit          Symptoms and Signs    Dizziness - Primary    Overview     I have reviewed labs with patient.  Unsure of the cause.  Reports occurs throughout the day. She has established care with neurology.         Relevant Orders    CBC & Differential    Comprehensive Metabolic Panel    Magnesium      MyChart video visit  Provider at home, pt in her home in  [place]  Instructed to go to the office for labs  Make sure to hydrate well      Current Outpatient Medications:     acetaminophen (TYLENOL) 325 MG tablet, Take 2 tablets by mouth Every 4 (Four) Hours As Needed for Mild Pain. Take every 4 hours while awake for 7 days then only as needed., Disp: 100 tablet, Rfl: 0    albuterol sulfate  (90 Base) MCG/ACT inhaler, Inhale 2 puffs Every 4 (Four) Hours As Needed for Wheezing., Disp: , Rfl:     aspirin 81 MG EC tablet, Take 1 tablet by mouth Daily., Disp: , Rfl:     busPIRone (BUSPAR) 5 MG tablet, Take 1 tablet by mouth 3 (Three) Times a Day., Disp: 90 tablet, Rfl: 2    Continuous Blood Gluc  (Dexcom G7 ) device, 1 each Daily., Disp: 1 each, Rfl: 0    Continuous Blood Gluc Sensor (Dexcom G6 Sensor), Every 10 (Ten) Days., Disp: 3 each, Rfl: 3    Continuous Blood Gluc Sensor (Dexcom G7 Sensor) misc, 1 each Every 10 (Ten) Days., Disp: 3 each, Rfl: 11    Continuous Blood Gluc Transmit (Dexcom G6 Transmitter) misc, 1 Device See Admin Instructions., Disp: 1 each, Rfl: 0    cyclobenzaprine (FLEXERIL) 10 MG tablet, Take 1 tablet by mouth 3 (Three) Times a Day As Needed for Muscle Spasms., Disp: 12 tablet, Rfl: 0    diphenhydrAMINE (BENADRYL) 25 MG tablet, Take 1 tablet by mouth At Night As Needed for Itching., Disp: , Rfl:     DULoxetine (Cymbalta) 30 MG capsule, Take 3 capsules by mouth Daily for 90 days., Disp: 90 capsule, Rfl: 2    EPINEPHrine 0.1 MG/0.1ML solution auto-injector, Inject  as directed., Disp: , Rfl:     ferrous sulfate 324 (65 Fe) MG tablet delayed-release EC tablet, Take 1 tablet by mouth 2 (Two) Times a Day With Meals. 2 tabs BID, Disp: , Rfl:     fluticasone-salmeterol (ADVAIR) 250-50 MCG/DOSE DISKUS, Inhale 2 puffs As Needed., Disp: , Rfl:     Fremanezumab-vfrm (Ajovy) 225 MG/1.5ML solution auto-injector, Inject 225 mg under the skin into the appropriate area as directed Every 30 (Thirty) Days., Disp: 1.5 mL, Rfl: 11    gabapentin  (NEURONTIN) 400 MG capsule, Take 1 capsule by mouth 3 (Three) Times a Day., Disp: , Rfl:     glucagon (GLUCAGEN) 1 MG injection, Inject 1 mg into the appropriate muscle as directed by prescriber., Disp: , Rfl:     HumaLOG KwikPen 100 UNIT/ML solution pen-injector, Take 5-10 units TID with meals sliding scale NO MORE THAN 60UNITS DAILY, Disp: 500 mL, Rfl: 5    hydrOXYzine (ATARAX) 10 MG tablet, Take 1 tablet by mouth Every 4 (Four) Hours As Needed for Anxiety., Disp: 60 tablet, Rfl: 5    ibuprofen (ADVIL,MOTRIN) 600 MG tablet, Take 1 tablet by mouth 3 (Three) Times a Day., Disp: 15 tablet, Rfl: 0    Insulin Glargine (LANTUS SOLOSTAR) 100 UNIT/ML injection pen, Inject 10 Units under the skin into the appropriate area as directed 2 (Two) Times a Day., Disp: 6 mL, Rfl: 2    levETIRAcetam (KEPPRA) 500 MG tablet, Take 1 tablet by mouth 2 (Two) Times a Day for 90 days., Disp: 60 tablet, Rfl: 2    Magnesium 200 MG chewable tablet, Chew 1 tablet Daily., Disp: , Rfl:     metoprolol tartrate (LOPRESSOR) 50 MG tablet, Take 1 tablet by mouth 2 (Two) Times a Day., Disp: 180 tablet, Rfl: 3    nitroglycerin (NITROSTAT) 0.6 MG SL tablet, Place 1 tablet under the tongue Every 5 (Five) Minutes As Needed for Chest Pain., Disp: , Rfl:     ondansetron (ZOFRAN) 4 MG tablet, Take 1 tablet by mouth Every 8 (Eight) Hours As Needed for Nausea or Vomiting., Disp: 30 tablet, Rfl: 0    Potassium Chloride (KLOR-CON 10 PO), Take 1 tablet by mouth 2 (two) times a day., Disp: , Rfl:     prazosin (MINIPRESS) 5 MG capsule, Take 2 capsules by mouth Every Night for 90 days., Disp: 60 capsule, Rfl: 2    sertraline (Zoloft) 50 MG tablet, Take 1 tablet by mouth Daily for 90 days., Disp: 30 tablet, Rfl: 2    traZODone (DESYREL) 50 MG tablet, Take 1-2 tablets PO 30 minutes prior to bedtime, Disp: 60 tablet, Rfl: 5    ubrogepant (UBRELVY) 100 MG tablet, Take 1 tablet by mouth 1 (One) Time As Needed (migraine) for up to 30 doses., Disp: 16 tablet, Rfl: 3     Unable to find, 1 each As Needed. Med Name: Hemp Larry, Disp: , Rfl:     vitamin D (ERGOCALCIFEROL) 1.25 MG (44482 UT) capsule capsule, Take 1 capsule by mouth 1 (One) Time Per Week. Saturday's, Disp: , Rfl:        Plan of care reviewed with the patient at the conclusion of today's visit.  Education was provided regarding diagnosis, management, and any prescribed or recommended OTC medications.  Patient verbalized understanding of and agreement with management plan.     Return for Next scheduled follow up.        TAWNY Gallo

## 2023-09-11 NOTE — TELEPHONE ENCOUNTER
Rx Refill Note  Requested Prescriptions     Pending Prescriptions Disp Refills    levETIRAcetam (KEPPRA) 500 MG tablet [Pharmacy Med Name: LEVETIRACETAM 500MG TABLETS] 60 tablet 2     Sig: TAKE ONE TABLET BY MOUTH 2 TIMES A DAY      Last filled: 7/13/23 with 2 refills to [facility]. Duplicate request.   Last office visit with prescribing clinician: 5/4/2023      Next office visit with prescribing clinician: 10/17/2023     Agatha Collins MA  08/07/23, 14:35 EDT  
9 years or older (need ONE dose)...

## 2023-09-12 ENCOUNTER — PRIOR AUTHORIZATION (OUTPATIENT)
Dept: INTERNAL MEDICINE | Facility: CLINIC | Age: 53
End: 2023-09-12
Payer: COMMERCIAL

## 2023-09-12 ENCOUNTER — LAB (OUTPATIENT)
Dept: INTERNAL MEDICINE | Facility: CLINIC | Age: 53
End: 2023-09-12
Payer: COMMERCIAL

## 2023-09-12 DIAGNOSIS — R42 DIZZINESS: ICD-10-CM

## 2023-09-12 LAB
BASOPHILS # BLD AUTO: 0.03 10*3/MM3 (ref 0–0.2)
BASOPHILS NFR BLD AUTO: 0.4 % (ref 0–1.5)
DEPRECATED RDW RBC AUTO: 46.3 FL (ref 37–54)
EOSINOPHIL # BLD AUTO: 0.04 10*3/MM3 (ref 0–0.4)
EOSINOPHIL NFR BLD AUTO: 0.5 % (ref 0.3–6.2)
ERYTHROCYTE [DISTWIDTH] IN BLOOD BY AUTOMATED COUNT: 14.5 % (ref 12.3–15.4)
HCT VFR BLD AUTO: 36.6 % (ref 34–46.6)
HGB BLD-MCNC: 12.7 G/DL (ref 12–15.9)
IMM GRANULOCYTES # BLD AUTO: 0.03 10*3/MM3 (ref 0–0.05)
IMM GRANULOCYTES NFR BLD AUTO: 0.4 % (ref 0–0.5)
LYMPHOCYTES # BLD AUTO: 2.34 10*3/MM3 (ref 0.7–3.1)
LYMPHOCYTES NFR BLD AUTO: 30.3 % (ref 19.6–45.3)
MAGNESIUM SERPL-MCNC: 2.1 MG/DL (ref 1.6–2.6)
MCH RBC QN AUTO: 30.1 PG (ref 26.6–33)
MCHC RBC AUTO-ENTMCNC: 34.7 G/DL (ref 31.5–35.7)
MCV RBC AUTO: 86.7 FL (ref 79–97)
MONOCYTES # BLD AUTO: 0.38 10*3/MM3 (ref 0.1–0.9)
MONOCYTES NFR BLD AUTO: 4.9 % (ref 5–12)
NEUTROPHILS NFR BLD AUTO: 4.9 10*3/MM3 (ref 1.7–7)
NEUTROPHILS NFR BLD AUTO: 63.5 % (ref 42.7–76)
NRBC BLD AUTO-RTO: 0 /100 WBC (ref 0–0.2)
PLATELET # BLD AUTO: 240 10*3/MM3 (ref 140–450)
PMV BLD AUTO: 9.4 FL (ref 6–12)
RBC # BLD AUTO: 4.22 10*6/MM3 (ref 3.77–5.28)
WBC NRBC COR # BLD: 7.72 10*3/MM3 (ref 3.4–10.8)

## 2023-09-12 PROCEDURE — 85025 COMPLETE CBC W/AUTO DIFF WBC: CPT | Performed by: NURSE PRACTITIONER

## 2023-09-12 PROCEDURE — 36415 COLL VENOUS BLD VENIPUNCTURE: CPT | Performed by: NURSE PRACTITIONER

## 2023-09-12 PROCEDURE — 83735 ASSAY OF MAGNESIUM: CPT | Performed by: NURSE PRACTITIONER

## 2023-09-12 PROCEDURE — 80053 COMPREHEN METABOLIC PANEL: CPT | Performed by: NURSE PRACTITIONER

## 2023-09-13 LAB
ALBUMIN SERPL-MCNC: 3.9 G/DL (ref 3.5–5.2)
ALBUMIN/GLOB SERPL: 1.3 G/DL
ALP SERPL-CCNC: 71 U/L (ref 39–117)
ALT SERPL W P-5'-P-CCNC: 10 U/L (ref 1–33)
ANION GAP SERPL CALCULATED.3IONS-SCNC: 11.2 MMOL/L (ref 5–15)
AST SERPL-CCNC: 17 U/L (ref 1–32)
BILIRUB SERPL-MCNC: 0.2 MG/DL (ref 0–1.2)
BUN SERPL-MCNC: 15 MG/DL (ref 6–20)
BUN/CREAT SERPL: 17.2 (ref 7–25)
CALCIUM SPEC-SCNC: 9 MG/DL (ref 8.6–10.5)
CHLORIDE SERPL-SCNC: 103 MMOL/L (ref 98–107)
CO2 SERPL-SCNC: 21.8 MMOL/L (ref 22–29)
CREAT SERPL-MCNC: 0.87 MG/DL (ref 0.57–1)
EGFRCR SERPLBLD CKD-EPI 2021: 80.8 ML/MIN/1.73
GLOBULIN UR ELPH-MCNC: 3.1 GM/DL
GLUCOSE SERPL-MCNC: 79 MG/DL (ref 65–99)
POTASSIUM SERPL-SCNC: 4.2 MMOL/L (ref 3.5–5.2)
PROT SERPL-MCNC: 7 G/DL (ref 6–8.5)
SODIUM SERPL-SCNC: 136 MMOL/L (ref 136–145)

## 2023-09-13 RX ORDER — SODIUM PICOSULFATE, MAGNESIUM OXIDE, AND ANHYDROUS CITRIC ACID 10; 3.5; 12 MG/160ML; G/160ML; G/160ML
350 LIQUID ORAL TAKE AS DIRECTED
Qty: 350 ML | Refills: 0 | Status: SHIPPED | OUTPATIENT
Start: 2023-09-13

## 2023-09-14 ENCOUNTER — TELEMEDICINE (OUTPATIENT)
Dept: PSYCHIATRY | Facility: CLINIC | Age: 53
End: 2023-09-14
Payer: COMMERCIAL

## 2023-09-14 DIAGNOSIS — F43.10 POST TRAUMATIC STRESS DISORDER (PTSD): ICD-10-CM

## 2023-09-14 DIAGNOSIS — F33.1 MODERATE EPISODE OF RECURRENT MAJOR DEPRESSIVE DISORDER: Primary | ICD-10-CM
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
## 2023-09-18 ENCOUNTER — TELEMEDICINE (OUTPATIENT)
Dept: INTERNAL MEDICINE | Facility: CLINIC | Age: 53
End: 2023-09-18
Payer: COMMERCIAL

## 2023-09-18 ENCOUNTER — CLINICAL SUPPORT (OUTPATIENT)
Dept: INTERNAL MEDICINE | Facility: CLINIC | Age: 53
End: 2023-09-18
Payer: COMMERCIAL

## 2023-09-18 VITALS
SYSTOLIC BLOOD PRESSURE: 110 MMHG | BODY MASS INDEX: 29.25 KG/M2 | DIASTOLIC BLOOD PRESSURE: 74 MMHG | HEIGHT: 66 IN | WEIGHT: 182 LBS | TEMPERATURE: 97.3 F

## 2023-09-18 DIAGNOSIS — J02.9 SORE THROAT: Primary | ICD-10-CM

## 2023-09-18 DIAGNOSIS — R11.2 NAUSEA AND VOMITING, UNSPECIFIED VOMITING TYPE: ICD-10-CM

## 2023-09-18 LAB
EXPIRATION DATE: ABNORMAL
EXPIRATION DATE: NORMAL
FLUAV AG UPPER RESP QL IA.RAPID: NOT DETECTED
FLUBV AG UPPER RESP QL IA.RAPID: NOT DETECTED
INTERNAL CONTROL: ABNORMAL
INTERNAL CONTROL: NORMAL
Lab: ABNORMAL
Lab: NORMAL
S PYO AG THROAT QL: POSITIVE
SARS-COV-2 AG UPPER RESP QL IA.RAPID: NOT DETECTED

## 2023-09-18 PROCEDURE — 87428 SARSCOV & INF VIR A&B AG IA: CPT | Performed by: PHYSICIAN ASSISTANT

## 2023-09-18 PROCEDURE — 3044F HG A1C LEVEL LT 7.0%: CPT | Performed by: PHYSICIAN ASSISTANT

## 2023-09-18 PROCEDURE — 1160F RVW MEDS BY RX/DR IN RCRD: CPT | Performed by: PHYSICIAN ASSISTANT

## 2023-09-18 PROCEDURE — 87880 STREP A ASSAY W/OPTIC: CPT | Performed by: PHYSICIAN ASSISTANT

## 2023-09-18 PROCEDURE — 99213 OFFICE O/P EST LOW 20 MIN: CPT | Performed by: PHYSICIAN ASSISTANT

## 2023-09-18 PROCEDURE — 1159F MED LIST DOCD IN RCRD: CPT | Performed by: PHYSICIAN ASSISTANT

## 2023-09-18 RX ORDER — AZITHROMYCIN 250 MG/1
TABLET, FILM COATED ORAL
Qty: 6 TABLET | Refills: 0 | Status: SHIPPED | OUTPATIENT
Start: 2023-09-18

## 2023-09-18 RX ORDER — ONDANSETRON 4 MG/1
4 TABLET, ORALLY DISINTEGRATING ORAL EVERY 8 HOURS PRN
Qty: 30 TABLET | Refills: 1 | Status: SHIPPED | OUTPATIENT
Start: 2023-09-18

## 2023-09-22 RX ORDER — CLINDAMYCIN HYDROCHLORIDE 300 MG/1
300 CAPSULE ORAL 3 TIMES DAILY
Qty: 30 CAPSULE | Refills: 0 | Status: SHIPPED | OUTPATIENT
Start: 2023-09-22 | End: 2023-10-02

## 2023-09-25 ENCOUNTER — TELEMEDICINE (OUTPATIENT)
Dept: FAMILY MEDICINE CLINIC | Facility: TELEHEALTH | Age: 53
End: 2023-09-25

## 2023-09-25 VITALS — WEIGHT: 182 LBS | BODY MASS INDEX: 29.25 KG/M2 | HEIGHT: 66 IN

## 2023-09-25 DIAGNOSIS — J02.9 SORE THROAT: Primary | ICD-10-CM

## 2023-09-25 DIAGNOSIS — R11.0 NAUSEA: ICD-10-CM

## 2023-09-25 DIAGNOSIS — R42 DIZZINESS: ICD-10-CM

## 2023-09-25 NOTE — PROGRESS NOTES
,You have chosen to receive care through a telehealth visit.  Do you consent to use a video/audio connection for your medical care today? Yes     HPI  Sigrid Bello is a 53 y.o. female  presents with complaint of sore throat, aching, nausea, dizziness and vomiting, shaking hands. She was seen in her PCP office on 09/18/2023 and tested for strep, covid and the flu. Her strep was positive. She was diagnosed with sore throat, and nausea and vomiting. She was prescribed zofran and a zpak.  On 09/21/2023 she called to report that her throat was still hurting and clindamycin was prescribed for her. She had an appointment today with her primary care provider for follow up but reports that she did not feel like going in today. She does have afib. She does have diabetes. She blood sugar today was 120.     Review of Systems   Constitutional:  Positive for chills (at times) and fatigue. Negative for fever.   HENT:  Positive for sneezing (mild) and sore throat. Negative for congestion, postnasal drip, rhinorrhea, sinus pressure and sinus pain.    Respiratory:  Positive for cough, chest tightness (baseline) and shortness of breath (mild). Negative for wheezing.    Cardiovascular:         Afib   Gastrointestinal:  Positive for nausea and vomiting. Negative for diarrhea.   Musculoskeletal:  Positive for myalgias.        Hands shaky   Neurological:  Positive for dizziness.     Past Medical History:   Diagnosis Date    ADHD (attention deficit hyperactivity disorder)     Anemia     Anxiety and depression     Arthritis     Asthma     Atrial fibrillation     Blastomycosis     Diabetes mellitus     Fibromyalgia     HL (hearing loss)     Hyperlipidemia     Hypertension     Hypokalemia     Low back pain     Lupus     Migraine     MVP (mitral valve prolapse)     Obesity     PTSD (post-traumatic stress disorder)     Seizures     Last seizure March 2023    Sleep apnea     No CPAP    Tachycardia     Uterine mass        Family History  "  Problem Relation Age of Onset    Anxiety disorder Father     Breast cancer Maternal Grandmother     Asthma Paternal Grandfather     Cancer Paternal Grandfather         Mike    Depression Paternal Grandfather     Diabetes Paternal Grandfather     Hearing loss Paternal Grandfather     Ovarian cancer Neg Hx        Social History     Socioeconomic History    Marital status: Single   Tobacco Use    Smoking status: Never    Smokeless tobacco: Never   Vaping Use    Vaping Use: Never used   Substance and Sexual Activity    Alcohol use: Not Currently    Drug use: No    Sexual activity: Not Currently     Partners: Male     Birth control/protection: None       Sigrid Bello  reports that she has never smoked. She has never used smokeless tobacco..      Ht 167.6 cm (66\")   Wt 82.6 kg (182 lb)   LMP 07/14/2023   BMI 29.38 kg/m²     PHYSICAL EXAM  Physical Exam   Constitutional: She is oriented to person, place, and time. She appears well-developed.   HENT:   Head: Normocephalic and atraumatic.   Nose: Nose normal.   Eyes: Lids are normal. Right eye exhibits no discharge. Left eye exhibits no discharge. Right conjunctiva is not injected. Left conjunctiva is not injected.   Pulmonary/Chest:  No respiratory distress.  Neurological: She is alert and oriented to person, place, and time. No cranial nerve deficit.   Psychiatric: She has a normal mood and affect. Her speech is normal and behavior is normal. Judgment and thought content normal.     Results for orders placed or performed in visit on 09/18/23   POCT rapid strep A    Specimen: Swab   Result Value Ref Range    Rapid Strep A Screen Positive (A) Negative, VALID, INVALID, Not Performed    Internal Control Passed Passed    Lot Number [id number]     Expiration Date 01/04/2026    Covid-19 + Flu A&B AG, Veritor    Specimen: Swab   Result Value Ref Range    SARS Antigen Not Detected Not Detected, Presumptive Negative    Influenza A Antigen BRIGIDO Not Detected Not Detected "    Influenza B Antigen BRIGIDO Not Detected Not Detected    Internal Control Passed Passed    Lot Number [id number]     Expiration Date 04/18/2024        Diagnoses and all orders for this visit:    1. Sore throat (Primary)    2. Nausea    Finish Clindamycin ordered by primary care provider  Probiotics for one month related to taking antibiotics. The pharmacist can help you with this if needed. Do not take within two hours of antibiotic.  Zofran as needed for nausea  Hot tea  May alternate tylenol and ibuprofen for pain or fever  Hydrate well  Monitor blood sugars closely    FOLLOW-UP  If symptoms worsen or persist follow up with PCP or ER dependent on severity of symptoms    Patient verbalizes understanding of medication dosage, comfort measures, instructions for treatment and follow-up.    Bailee Carbone, APRN  09/25/2023  16:28 EDT    The use of a video visit has been reviewed with the patient and verbal informed consent has been obtained. Myself and Sigrid Bello participated in this visit. The patient is located in [address] [place] [address].    I am located in [place], [place]. [employer] and [employer] were utilized. I spent 30 minutes in the patient's chart for this visit.

## 2023-09-27 ENCOUNTER — HOSPITAL ENCOUNTER (EMERGENCY)
Facility: HOSPITAL | Age: 53
Discharge: HOME | End: 2023-09-27
Attending: EMERGENCY MEDICINE
Payer: COMMERCIAL

## 2023-09-27 ENCOUNTER — APPOINTMENT (OUTPATIENT)
Dept: GENERAL RADIOLOGY | Facility: HOSPITAL | Age: 53
End: 2023-09-27
Payer: COMMERCIAL

## 2023-09-27 VITALS
HEIGHT: 65 IN | OXYGEN SATURATION: 98 % | BODY MASS INDEX: 30.32 KG/M2 | HEART RATE: 97 BPM | SYSTOLIC BLOOD PRESSURE: 118 MMHG | RESPIRATION RATE: 16 BRPM | WEIGHT: 182 LBS | TEMPERATURE: 98 F | DIASTOLIC BLOOD PRESSURE: 86 MMHG

## 2023-09-27 DIAGNOSIS — B34.9 ACUTE VIRAL SYNDROME: Primary | ICD-10-CM

## 2023-09-27 DIAGNOSIS — R05.3 CHRONIC COUGH: ICD-10-CM

## 2023-09-27 PROCEDURE — 99283 EMERGENCY DEPT VISIT LOW MDM: CPT

## 2023-09-27 PROCEDURE — 99282 EMERGENCY DEPT VISIT SF MDM: CPT

## 2023-09-27 PROCEDURE — 71045 X-RAY EXAM CHEST 1 VIEW: CPT

## 2023-09-27 RX ORDER — TAMSULOSIN HYDROCHLORIDE 0.4 MG/1
1 CAPSULE ORAL NIGHTLY
Qty: 30 CAPSULE | Refills: 0 | Status: SHIPPED | OUTPATIENT
Start: 2023-09-27 | End: 2023-09-27

## 2023-09-27 RX ORDER — DEXTROMETHORPHAN HYDROBROMIDE AND PROMETHAZINE HYDROCHLORIDE 15; 6.25 MG/5ML; MG/5ML
5 SYRUP ORAL 4 TIMES DAILY PRN
Qty: 118 ML | Refills: 0 | Status: SHIPPED | OUTPATIENT
Start: 2023-09-27

## 2023-09-27 NOTE — Clinical Note
[facility] EMERGENCY DEPARTMENT  [address]  [facility] [address]  Phone: [phone]    Sigrid Bello was seen and treated in our emergency department on 9/27/2023.  She may return to work on 09/28/2023.         Thank you for choosing [facility].    Eb Nobles PA

## 2023-09-27 NOTE — ED PROVIDER NOTES
Subjective   History of Present Illness  53-year-old female presents emergency department today complaining of having a cough and congestion going on for about 2 weeks.  She is on 2 different antibiotics Wilmes a Z-Ad and was amoxicillin she is got no better.  States that she does not smoke.  She had no wheezing.  She had no shortness of breath and no pleuritic chest pain.    History provided by:  Patient   used: No    Cough  Cough characteristics:  Non-productive  Sputum characteristics:  Nondescript  Severity:  Moderate  Onset quality:  Gradual  Duration:  2 weeks  Timing:  Intermittent  Progression:  Unchanged  Chronicity:  New  Smoker: no    Context: sick contacts    Context: not animal exposure, not exposure to allergens, not fumes, not weather changes and not with activity    Relieved by:  Nothing  Worsened by:  Activity  Ineffective treatments: 2 separate antibiotics.  Associated symptoms: no chest pain, no chills, no fever and no wheezing    Risk factors: recent infection    Risk factors: no chemical exposure and no recent travel      Review of Systems   Constitutional:  Negative for chills and fever.   Respiratory:  Positive for cough. Negative for chest tightness and wheezing.    Cardiovascular:  Negative for chest pain, palpitations and leg swelling.   All other systems reviewed and are negative.    Past Medical History:   Diagnosis Date    ADHD (attention deficit hyperactivity disorder)     Anemia     Anxiety and depression     Arthritis     Asthma     Atrial fibrillation     Blastomycosis     Diabetes mellitus     Fibromyalgia     HL (hearing loss)     Hyperlipidemia     Hypertension     Hypokalemia     Low back pain     Lupus     Migraine     MVP (mitral valve prolapse)     Obesity     PTSD (post-traumatic stress disorder)     Seizures     Last seizure March 2023    Sleep apnea     No CPAP    Tachycardia     Uterine mass        Allergies   Allergen Reactions    Meperidine  Anaphylaxis and Unknown (See Comments)     unknown    Morphine Shortness Of Breath and Unknown (See Comments)    Mushroom Anaphylaxis    Peanut Oil Anaphylaxis    Penicillins Unknown (See Comments) and Anaphylaxis     Childhood reaction    Iodine Hives    Tree Nut Hives    Cortisone Hives and Rash    Other Hives     Mayonnaise        Past Surgical History:   Procedure Laterality Date    ANKLE SURGERY      both ankles rebuilt    INNER EAR SURGERY      KNEE SURGERY      TEETH EXTRACTION      TOTAL LAPAROSCOPIC HYSTERECTOMY SALPINGECTOMY N/A 7/20/2023    Procedure: TOTAL LAPAROSCOPIC HYSTERECTOMY BILATERAL SALPINGECTOMY WITH DAVINCI ROBOT;  Surgeon: Monisha Greene MD;  Location:  [facility];  Service: Robotics - DaVinci;  Laterality: N/A;    UVULECTOMY      VAGINAL REPAIR N/A 7/20/2023    Procedure: VAGINAL SIDEWALL LACERATION REPAIR LEFT;  Surgeon: Monisha Greene MD;  Location:  [facility];  Service: Obstetrics/Gynecology;  Laterality: N/A;       Family History   Problem Relation Age of Onset    Anxiety disorder Father     Breast cancer Maternal Grandmother     Asthma Paternal Grandfather     Cancer Paternal Grandfather         Mike    Depression Paternal Grandfather     Diabetes Paternal Grandfather     Hearing loss Paternal Grandfather     Ovarian cancer Neg Hx        Social History     Socioeconomic History    Marital status: Single   Tobacco Use    Smoking status: Never    Smokeless tobacco: Never   Vaping Use    Vaping Use: Never used   Substance and Sexual Activity    Alcohol use: Not Currently    Drug use: No    Sexual activity: Not Currently     Partners: Male     Birth control/protection: None           Objective   Physical Exam  Vitals and nursing note reviewed.   Constitutional:       Appearance: She is well-developed.   HENT:      Head: Normocephalic and atraumatic.      Right Ear: External ear normal.      Left Ear: External ear normal.      Nose: Nose normal.   Eyes:      General: No scleral  icterus.     Conjunctiva/sclera: Conjunctivae normal.   Neck:      Thyroid: No thyromegaly.   Cardiovascular:      Rate and Rhythm: Normal rate and regular rhythm.      Heart sounds: Normal heart sounds.   Pulmonary:      Effort: Pulmonary effort is normal. No respiratory distress.      Breath sounds: Normal breath sounds. No wheezing or rales.   Chest:      Chest wall: No tenderness.   Abdominal:      General: Bowel sounds are normal. There is no distension.      Palpations: Abdomen is soft.      Tenderness: There is no abdominal tenderness.   Musculoskeletal:         General: Normal range of motion.      Cervical back: Normal range of motion.   Lymphadenopathy:      Cervical: No cervical adenopathy.   Skin:     General: Skin is warm and dry.   Neurological:      Mental Status: She is alert and oriented to person, place, and time.      Cranial Nerves: No cranial nerve deficit.      Coordination: Coordination normal.      Deep Tendon Reflexes: Reflexes are normal and symmetric. Reflexes normal.   Psychiatric:         Behavior: Behavior normal.         Thought Content: Thought content normal.         Judgment: Judgment normal.       Procedures           ED Course           No results found for this or any previous visit (from the past 24 hour(s)).  Note: In addition to lab results from this visit, the labs listed above may include labs taken at another facility or during a different encounter within the last 24 hours. Please correlate lab times with ED admission and discharge times for further clarification of the services performed during this visit.    XR Chest 1 View   Final Result   Impression:   No evidence of acute cardiopulmonary disease on this portable radiograph.      Electronically Signed: Matthew Lr MD     9/27/2023 3:44 PM EDT     Workstation ID: [id number]        Vitals:    09/27/23 1350   BP: 118/86   BP Location: Left arm   Patient Position: Sitting   Pulse: 97   Resp: 16   Temp: 98 °F (36.7 °C)  "  TempSrc: Oral   SpO2: 98%   Weight: 82.6 kg (182 lb)   Height: 165.1 cm (65\")     Medications - No data to display  ECG/EMG Results (last 24 hours)       ** No results found for the last 24 hours. **          No orders to display                                       Medical Decision Making  We discussed swabbing her for COVID and influenza however she had this for 2 weeks she is passed any type of quarantine.  Chest x-ray reviewed does not reveal any acute infiltrate.  She had Zithromax and amoxicillin which would prevent further this is probably viral.    Problems Addressed:  Acute viral syndrome: complicated acute illness or injury  Chronic cough: complicated acute illness or injury    Amount and/or Complexity of Data Reviewed  Radiology: ordered.    Risk  Prescription drug management.        Final diagnoses:   Acute viral syndrome   Chronic cough       ED Disposition  ED Disposition       ED Disposition   Discharge    Condition   Stable    Comment   --               Roverto Israel, APRN  [address]  [address]  [place] [address]  [phone]      Call for appointment         Medication List        New Prescriptions      promethazine-dextromethorphan 6.25-15 MG/5ML syrup  Commonly known as: PROMETHAZINE-DM  Take 5 mL by mouth 4 (Four) Times a Day As Needed for Cough.               Where to Get Your Medications        These medications were sent to [facility] [place] - [address] - [phone]  - [phone] [place] [id number]      Phone: [phone]   promethazine-dextromethorphan 6.25-15 MG/5ML syrup            Eb Nobles PA  09/28/23 1012    "

## 2023-10-05 ENCOUNTER — OFFICE VISIT (OUTPATIENT)
Dept: INTERNAL MEDICINE | Facility: CLINIC | Age: 53
End: 2023-10-05
Payer: COMMERCIAL

## 2023-10-05 VITALS
HEART RATE: 103 BPM | TEMPERATURE: 96.9 F | BODY MASS INDEX: 31.62 KG/M2 | OXYGEN SATURATION: 99 % | WEIGHT: 189.8 LBS | DIASTOLIC BLOOD PRESSURE: 70 MMHG | HEIGHT: 65 IN | SYSTOLIC BLOOD PRESSURE: 98 MMHG

## 2023-10-05 DIAGNOSIS — J31.2 CHRONIC SORE THROAT: Primary | ICD-10-CM

## 2023-10-05 DIAGNOSIS — R05.8 DRY COUGH: ICD-10-CM

## 2023-10-05 PROCEDURE — 1160F RVW MEDS BY RX/DR IN RCRD: CPT | Performed by: PHYSICIAN ASSISTANT

## 2023-10-05 PROCEDURE — 3044F HG A1C LEVEL LT 7.0%: CPT | Performed by: PHYSICIAN ASSISTANT

## 2023-10-05 PROCEDURE — 99213 OFFICE O/P EST LOW 20 MIN: CPT | Performed by: PHYSICIAN ASSISTANT

## 2023-10-05 PROCEDURE — 1159F MED LIST DOCD IN RCRD: CPT | Performed by: PHYSICIAN ASSISTANT

## 2023-10-05 RX ORDER — BENZONATATE 100 MG/1
100 CAPSULE ORAL 3 TIMES DAILY PRN
Qty: 90 CAPSULE | Refills: 1 | Status: SHIPPED | OUTPATIENT
Start: 2023-10-05

## 2023-10-05 NOTE — PROGRESS NOTES
[facility] PRIMARY CARE  [address]  Formerly [facility] [address]  Dept: [phone]  Dept Fax: [phone]  Loc: [phone]  Loc Fax: [phone]    Sigrid Bello  1970    Follow Up Office Visit Note    History of Present Illness:  Patient is a 53-year-old female in today for dry cough and chronic sore throat.  Patient was diagnosed with strep and took medicine as directed but states throat still sore.  Has seen ear nose and throat before.  Would like to see them again.    Patient reports a dry cough.  This has been causing her back to hurt when she breathes in.  She has been coughing so much, cough is worse at night.      The following portions of the patient's history were reviewed and updated as appropriate: allergies, current medications, past family history, past medical history, past social history, past surgical history, and problem list.    Medications:    Current Outpatient Medications:     acetaminophen (TYLENOL) 325 MG tablet, Take 2 tablets by mouth Every 4 (Four) Hours As Needed for Mild Pain. Take every 4 hours while awake for 7 days then only as needed., Disp: 100 tablet, Rfl: 0    albuterol sulfate  (90 Base) MCG/ACT inhaler, Inhale 2 puffs Every 4 (Four) Hours As Needed for Wheezing., Disp: , Rfl:     aspirin 81 MG EC tablet, Take 1 tablet by mouth Daily., Disp: , Rfl:     busPIRone (BUSPAR) 5 MG tablet, Take 1 tablet by mouth 3 (Three) Times a Day., Disp: 90 tablet, Rfl: 2    Continuous Blood Gluc  (Dexcom G7 ) device, 1 each Daily., Disp: 1 each, Rfl: 0    Continuous Blood Gluc Sensor (Dexcom G7 Sensor) misc, 1 each Every 10 (Ten) Days., Disp: 3 each, Rfl: 11    cyclobenzaprine (FLEXERIL) 10 MG tablet, Take 1 tablet by mouth 3 (Three) Times a Day As Needed for Muscle Spasms., Disp: 12 tablet, Rfl: 0    diphenhydrAMINE (BENADRYL) 25 MG tablet, Take 1 tablet by mouth At Night As Needed for Itching., Disp: , Rfl:      DULoxetine (Cymbalta) 30 MG capsule, Take 3 capsules by mouth Daily for 90 days., Disp: 90 capsule, Rfl: 2    EPINEPHrine 0.1 MG/0.1ML solution auto-injector, Inject  as directed., Disp: , Rfl:     ferrous sulfate 324 (65 Fe) MG tablet delayed-release EC tablet, Take 1 tablet by mouth 2 (Two) Times a Day With Meals. 2 tabs BID, Disp: , Rfl:     fluticasone-salmeterol (ADVAIR) 250-50 MCG/DOSE DISKUS, Inhale 2 puffs As Needed., Disp: , Rfl:     Fremanezumab-vfrm (Ajovy) 225 MG/1.5ML solution auto-injector, Inject 225 mg under the skin into the appropriate area as directed Every 30 (Thirty) Days., Disp: 1.5 mL, Rfl: 11    gabapentin (NEURONTIN) 400 MG capsule, Take 1 capsule by mouth 3 (Three) Times a Day., Disp: , Rfl:     glucagon (GLUCAGEN) 1 MG injection, Inject 1 mg into the appropriate muscle as directed by prescriber., Disp: , Rfl:     HumaLOG KwikPen 100 UNIT/ML solution pen-injector, Take 5-10 units TID with meals sliding scale NO MORE THAN 60UNITS DAILY, Disp: 500 mL, Rfl: 5    hydrOXYzine (ATARAX) 10 MG tablet, Take 1 tablet by mouth Every 4 (Four) Hours As Needed for Anxiety., Disp: 60 tablet, Rfl: 5    ibuprofen (ADVIL,MOTRIN) 600 MG tablet, Take 1 tablet by mouth 3 (Three) Times a Day., Disp: 15 tablet, Rfl: 0    Insulin Glargine (LANTUS SOLOSTAR) 100 UNIT/ML injection pen, Inject 10 Units under the skin into the appropriate area as directed 2 (Two) Times a Day., Disp: 6 mL, Rfl: 2    levETIRAcetam (KEPPRA) 500 MG tablet, Take 1 tablet by mouth 2 (Two) Times a Day for 90 days., Disp: 60 tablet, Rfl: 2    Magnesium 200 MG chewable tablet, Chew 1 tablet Daily., Disp: , Rfl:     metoprolol tartrate (LOPRESSOR) 50 MG tablet, Take 1 tablet by mouth 2 (Two) Times a Day., Disp: 180 tablet, Rfl: 3    nitroglycerin (NITROSTAT) 0.6 MG SL tablet, Place 1 tablet under the tongue Every 5 (Five) Minutes As Needed for Chest Pain., Disp: , Rfl:     ondansetron ODT (ZOFRAN-ODT) 4 MG disintegrating tablet, Place 1 tablet  on the tongue Every 8 (Eight) Hours As Needed for Nausea or Vomiting., Disp: 30 tablet, Rfl: 1    Potassium Chloride (KLOR-CON 10 PO), Take 1 tablet by mouth 2 (two) times a day., Disp: , Rfl:     promethazine-dextromethorphan (PROMETHAZINE-DM) 6.25-15 MG/5ML syrup, Take 5 mL by mouth 4 (Four) Times a Day As Needed for Cough., Disp: 118 mL, Rfl: 0    sertraline (Zoloft) 50 MG tablet, Take 1 tablet by mouth Daily for 90 days., Disp: 30 tablet, Rfl: 2    Sod Picosulfate-Mag Ox-Cit Acd (Clenpiq) 10-3.5-12 MG-GM -GM/160ML solution, Take 350 mL by mouth Take As Directed., Disp: 350 mL, Rfl: 0    traZODone (DESYREL) 50 MG tablet, Take 1-2 tablets PO 30 minutes prior to bedtime, Disp: 60 tablet, Rfl: 5    ubrogepant (UBRELVY) 100 MG tablet, Take 1 tablet by mouth 1 (One) Time As Needed (migraine) for up to 30 doses., Disp: 16 tablet, Rfl: 3    Unable to find, 1 each As Needed. Med Name: Key Juarez, Disp: , Rfl:     vitamin D (ERGOCALCIFEROL) 1.25 MG (65281 UT) capsule capsule, Take 1 capsule by mouth 1 (One) Time Per Week. Saturday's, Disp: , Rfl:     azithromycin (Zithromax Z-Ad) 250 MG tablet, Take 2 tablets by mouth on day 1, then 1 tablet daily on days 2-5 (Patient not taking: Reported on 10/5/2023), Disp: 6 tablet, Rfl: 0    benzonatate (Tessalon Perles) 100 MG capsule, Take 1 capsule by mouth 3 (Three) Times a Day As Needed for Cough., Disp: 90 capsule, Rfl: 1    Subjective  Allergies   Allergen Reactions    Meperidine Anaphylaxis and Unknown (See Comments)     unknown    Morphine Shortness Of Breath and Unknown (See Comments)    Mushroom Anaphylaxis    Peanut Oil Anaphylaxis    Penicillins Unknown (See Comments) and Anaphylaxis     Childhood reaction    Iodine Hives    Tree Nut Hives    Cortisone Hives and Rash    Other Hives     Mayonnaise         Past Medical History:   Diagnosis Date    ADHD (attention deficit hyperactivity disorder)     Anemia     Anxiety and depression     Arthritis     Asthma     Atrial  fibrillation     Blastomycosis     Diabetes mellitus     Fibromyalgia     HL (hearing loss)     Hyperlipidemia     Hypertension     Hypokalemia     Low back pain     Lupus     Migraine     MVP (mitral valve prolapse)     Obesity     PTSD (post-traumatic stress disorder)     Seizures     Last seizure March 2023    Sleep apnea     No CPAP    Tachycardia     Uterine mass        Past Surgical History:   Procedure Laterality Date    ANKLE SURGERY      both ankles rebuilt    INNER EAR SURGERY      KNEE SURGERY      TEETH EXTRACTION      TOTAL LAPAROSCOPIC HYSTERECTOMY SALPINGECTOMY N/A 7/20/2023    Procedure: TOTAL LAPAROSCOPIC HYSTERECTOMY BILATERAL SALPINGECTOMY WITH DAVINCI ROBOT;  Surgeon: Monisha rGeene MD;  Location:  [facility];  Service: Robotics - DaVinci;  Laterality: N/A;    UVULECTOMY      VAGINAL REPAIR N/A 7/20/2023    Procedure: VAGINAL SIDEWALL LACERATION REPAIR LEFT;  Surgeon: Monisha Greene MD;  Location:  [facility];  Service: Obstetrics/Gynecology;  Laterality: N/A;       Family History   Problem Relation Age of Onset    Anxiety disorder Father     Breast cancer Maternal Grandmother     Asthma Paternal Grandfather     Cancer Paternal Grandfather         Mike    Depression Paternal Grandfather     Diabetes Paternal Grandfather     Hearing loss Paternal Grandfather     Ovarian cancer Neg Hx         Social History     Socioeconomic History    Marital status: Single   Tobacco Use    Smoking status: Never    Smokeless tobacco: Never   Vaping Use    Vaping Use: Never used   Substance and Sexual Activity    Alcohol use: Not Currently    Drug use: No    Sexual activity: Not Currently     Partners: Male     Birth control/protection: None       Review of Systems   Constitutional:  Negative for activity change, chills, fatigue, fever and unexpected weight change.   HENT:  Positive for sore throat. Negative for congestion, ear pain, postnasal drip and sinus pressure.    Eyes:  Negative for pain, discharge and  "redness.   Respiratory:  Positive for cough. Negative for shortness of breath and wheezing.    Cardiovascular:  Negative for chest pain, palpitations and leg swelling.   Gastrointestinal:  Negative for diarrhea, nausea and vomiting.   Endocrine: Negative for cold intolerance and heat intolerance.   Genitourinary:  Negative for decreased urine volume and dysuria.   Musculoskeletal:  Negative for arthralgias and myalgias.   Skin:  Negative for rash and wound.   Neurological:  Negative for dizziness, light-headedness and headaches.   Hematological:  Does not bruise/bleed easily.   Psychiatric/Behavioral:  Negative for confusion, dysphoric mood and sleep disturbance. The patient is not nervous/anxious.        Objective  Vitals:    10/05/23 1616   BP: 98/70   BP Location: Left arm   Patient Position: Sitting   Cuff Size: Adult   Pulse: 103   Temp: 96.9 °F (36.1 °C)   TempSrc: Temporal   SpO2: 99%   Weight: 86.1 kg (189 lb 12.8 oz)   Height: 165.1 cm (65\")     Body mass index is 31.58 kg/m².     Physical Exam  Physical Exam  Vitals and nursing note reviewed.   Constitutional:       General: She is not in acute distress.     Appearance: She is not ill-appearing.   HENT:      Head: Normocephalic.      Right Ear: Tympanic membrane, ear canal and external ear normal. There is no impacted cerumen.      Left Ear: Tympanic membrane, ear canal and external ear normal. There is no impacted cerumen.      Nose: No congestion or rhinorrhea.      Mouth/Throat:      Mouth: Mucous membranes are moist.      Pharynx: Oropharynx is clear. No oropharyngeal exudate or posterior oropharyngeal erythema.   Eyes:      General:         Right eye: No discharge.         Left eye: No discharge.      Extraocular Movements: Extraocular movements intact.      Conjunctiva/sclera: Conjunctivae normal.      Pupils: Pupils are equal, round, and reactive to light.   Cardiovascular:      Rate and Rhythm: Normal rate and regular rhythm.      Heart sounds: " Normal heart sounds. No murmur heard.    No friction rub. No gallop.   Pulmonary:      Effort: Pulmonary effort is normal. No respiratory distress.      Breath sounds: Normal breath sounds. No wheezing.   Abdominal:      General: Bowel sounds are normal. There is no distension.      Palpations: Abdomen is soft. There is no mass.      Tenderness: There is no abdominal tenderness.   Musculoskeletal:         General: No swelling. Normal range of motion.      Cervical back: Normal range of motion. No tenderness.      Right lower leg: No edema.      Left lower leg: No edema.   Lymphadenopathy:      Cervical: No cervical adenopathy.   Skin:     Findings: No bruising, erythema or rash.   Neurological:      Mental Status: She is oriented to person, place, and time.      Gait: Gait normal.   Psychiatric:         Mood and Affect: Mood normal.         Behavior: Behavior normal.         Thought Content: Thought content normal.         Judgment: Judgment normal.       Diagnostic Data  Procedures    Assessment  Diagnoses and all orders for this visit:    1. Chronic sore throat (Primary)  -     Ambulatory Referral to ENT (Otolaryngology)    2. Dry cough    Other orders  -     benzonatate (Tessalon Perles) 100 MG capsule; Take 1 capsule by mouth 3 (Three) Times a Day As Needed for Cough.  Dispense: 90 capsule; Refill: 1        Plan    1. Chronic sore throat (Primary)- referred to ENT.    2. Dry cough- Tessalon Perles as directed.      Return for Next scheduled follow up.    Valente Jung PA-C  10/05/2023

## 2023-10-06 ENCOUNTER — HOSPITAL ENCOUNTER (EMERGENCY)
Facility: HOSPITAL | Age: 53
Discharge: HOME | End: 2023-10-06
Attending: EMERGENCY MEDICINE
Payer: COMMERCIAL

## 2023-10-06 ENCOUNTER — APPOINTMENT (OUTPATIENT)
Dept: GENERAL RADIOLOGY | Facility: HOSPITAL | Age: 53
End: 2023-10-06
Payer: COMMERCIAL

## 2023-10-06 VITALS
DIASTOLIC BLOOD PRESSURE: 87 MMHG | SYSTOLIC BLOOD PRESSURE: 108 MMHG | HEIGHT: 66 IN | TEMPERATURE: 97.7 F | HEART RATE: 114 BPM | WEIGHT: 182 LBS | OXYGEN SATURATION: 99 % | BODY MASS INDEX: 29.25 KG/M2 | RESPIRATION RATE: 16 BRPM

## 2023-10-06 DIAGNOSIS — S63.632A SPRAIN OF INTERPHALANGEAL JOINT OF RIGHT MIDDLE FINGER, INITIAL ENCOUNTER: Primary | ICD-10-CM

## 2023-10-06 DIAGNOSIS — S60.221A CONTUSION OF RIGHT HAND, INITIAL ENCOUNTER: ICD-10-CM

## 2023-10-06 PROCEDURE — 99283 EMERGENCY DEPT VISIT LOW MDM: CPT

## 2023-10-06 PROCEDURE — 73130 X-RAY EXAM OF HAND: CPT

## 2023-10-06 RX ORDER — IBUPROFEN 800 MG/1
800 TABLET ORAL ONCE
Status: COMPLETED | OUTPATIENT
Start: 2023-10-06 | End: 2023-10-06

## 2023-10-06 RX ADMIN — IBUPROFEN 800 MG: 800 TABLET, FILM COATED ORAL at 11:50

## 2023-10-06 NOTE — Clinical Note
[facility] EMERGENCY DEPARTMENT  [address]  [facility] [address]  Phone: [phone]    Sigrid Bello was seen and treated in our emergency department on 10/6/2023.  She may return to work on 10/09/2023.         Thank you for choosing [facility].    Jake Reardon MD

## 2023-10-06 NOTE — DISCHARGE INSTRUCTIONS
As we discussed clearly, return for any appearance of redness, streaking, oozing of drainage, fever, chills or systemic illness.

## 2023-10-06 NOTE — ED PROVIDER NOTES
"Subjective   History of Present Illness: Patient is a 53-year-old female accompanied by her , complaining of right hand pain, specifically in the last 2 days she noticed increased swelling in her middle and fourth finger and knuckles, and reports the pain with flexion began last night.  Patient reports that her pet dog, often scratches or eats her extremities, stating, \"he does not know that mom's hand is under the peanut butter treats\".  Patient however denies that the dog or other pets have punctured her skin on her hand or fingers.  Patient reports she has a skin condition which causes lesions which she often scratches and they become open sores.    Review of Systems   Constitutional: Negative.    HENT: Negative.     Respiratory: Negative.     Cardiovascular: Negative.    Gastrointestinal: Negative.    Genitourinary: Negative.    Musculoskeletal:  Positive for arthralgias and joint swelling.   Skin:  Positive for wound.   Neurological: Negative.    Hematological: Negative.    Psychiatric/Behavioral: Negative.       Past Medical History:   Diagnosis Date    ADHD (attention deficit hyperactivity disorder)     Anemia     Anxiety and depression     Arthritis     Asthma     Atrial fibrillation     Blastomycosis     Diabetes mellitus     Fibromyalgia     HL (hearing loss)     Hyperlipidemia     Hypertension     Hypokalemia     Low back pain     Lupus     Migraine     MVP (mitral valve prolapse)     Obesity     PTSD (post-traumatic stress disorder)     Seizures     Last seizure March 2023    Sleep apnea     No CPAP    Tachycardia     Uterine mass        Allergies   Allergen Reactions    Meperidine Anaphylaxis and Unknown (See Comments)     unknown    Morphine Shortness Of Breath and Unknown (See Comments)    Mushroom Anaphylaxis    Peanut Oil Anaphylaxis    Penicillins Unknown (See Comments) and Anaphylaxis     Childhood reaction    Iodine Hives    Tree Nut Hives    Cortisone Hives and Rash    Other Hives     " [facility]        Past Surgical History:   Procedure Laterality Date    ANKLE SURGERY      both ankles rebuilt    INNER EAR SURGERY      KNEE SURGERY      TEETH EXTRACTION      TOTAL LAPAROSCOPIC HYSTERECTOMY SALPINGECTOMY N/A 7/20/2023    Procedure: TOTAL LAPAROSCOPIC HYSTERECTOMY BILATERAL SALPINGECTOMY WITH DAVINCI ROBOT;  Surgeon: Monisha Greene MD;  Location:  [facility];  Service: Robotics - DaVinci;  Laterality: N/A;    UVULECTOMY      VAGINAL REPAIR N/A 7/20/2023    Procedure: VAGINAL SIDEWALL LACERATION REPAIR LEFT;  Surgeon: Monisha Greene MD;  Location:  [facility];  Service: Obstetrics/Gynecology;  Laterality: N/A;       Family History   Problem Relation Age of Onset    Anxiety disorder Father     Breast cancer Maternal Grandmother     Asthma Paternal Grandfather     Cancer Paternal Grandfather         Mike    Depression Paternal Grandfather     Diabetes Paternal Grandfather     Hearing loss Paternal Grandfather     Ovarian cancer Neg Hx        Social History     Socioeconomic History    Marital status: Single   Tobacco Use    Smoking status: Never    Smokeless tobacco: Never   Vaping Use    Vaping Use: Never used   Substance and Sexual Activity    Alcohol use: Not Currently    Drug use: No    Sexual activity: Not Currently     Partners: Male     Birth control/protection: None           Objective   Physical Exam  Constitutional:       General: She is not in acute distress.     Appearance: Normal appearance. She is not ill-appearing, toxic-appearing or diaphoretic.   HENT:      Head: Normocephalic and atraumatic.   Eyes:      Extraocular Movements: Extraocular movements intact.   Cardiovascular:      Rate and Rhythm: Normal rate.   Pulmonary:      Effort: Pulmonary effort is normal.   Abdominal:      General: Abdomen is flat.   Musculoskeletal:         General: Swelling, tenderness and signs of injury present. Normal range of motion.   Skin:     General: Skin is warm.      Capillary Refill: Capillary  refill takes less than 2 seconds.      Findings: Lesion present.   Neurological:      General: No focal deficit present.      Mental Status: She is alert and oriented to person, place, and time. Mental status is at baseline.   Psychiatric:         Mood and Affect: Mood normal.         Behavior: Behavior normal.       Procedures           ED Course      She was initially evaluated in [facility] room 26 approximately 11:30 AM.     Reevaluated patient approximately 12:55 PM, communicated results of x-ray, disposition plan.  Patient and  acknowledge plan, strict return precautions,.                                Medical Decision Making  Given patient's presenting symptoms and report of onset, and questionable history of trauma, such as falling on her outstretched hand, or hitting objects, patient primary differential diagnosis includes musculoskeletal sprain versus strain, bony deformity can fracture, subluxation, osteoarthritis, gout, cellulitis, joint disruption.  Patient will have plain film imaging of right hand, be administered anti-inflammatory pain medication, reevaluated with disposition as appropriate.    Problems Addressed:  Contusion of right hand, initial encounter: complicated acute illness or injury  Sprain of interphalangeal joint of right middle finger, initial encounter: complicated acute illness or injury    Amount and/or Complexity of Data Reviewed  Radiology: ordered.    Risk  Prescription drug management.        Final diagnoses:   Sprain of interphalangeal joint of right middle finger, initial encounter   Contusion of right hand, initial encounter       ED Disposition  ED Disposition       ED Disposition   Discharge    Condition   Stable    Comment   --               Roverto Israel, APRN  [address] [id number] [phone]      As needed         Medication List      No changes were made to your prescriptions during this visit.            Chapo Osorio,  APRN  10/07/23 2100

## 2023-10-10 ENCOUNTER — TELEPHONE (OUTPATIENT)
Dept: INTERNAL MEDICINE | Facility: CLINIC | Age: 53
End: 2023-10-10

## 2023-10-10 ENCOUNTER — TELEMEDICINE (OUTPATIENT)
Dept: PSYCHIATRY | Facility: CLINIC | Age: 53
End: 2023-10-10
Payer: COMMERCIAL

## 2023-10-10 DIAGNOSIS — F33.1 MODERATE EPISODE OF RECURRENT MAJOR DEPRESSIVE DISORDER: Primary | ICD-10-CM

## 2023-10-10 DIAGNOSIS — F43.10 POST TRAUMATIC STRESS DISORDER (PTSD): ICD-10-CM

## 2023-10-10 NOTE — PLAN OF CARE
Patient will develop healthy coping strategies, improving overall health, finding ways to increase exercise, identify and create short term goals, utilize grounding and breathing techniques  and increase self care practices.

## 2023-10-10 NOTE — PROGRESS NOTES
Date: October 10, 2023  Time In: 12:30PM  Time Out: 1:00PM  Patient was not feeling well and in bed so a 30 min session was completed. Patient scheduled appointment in approximately 3 weeks.      This provider is located at [place], [place] for [facility] (through [facility]), [address], [place] [address] using a secure [employer] Video Visit through [employer]. Patient is being seen remotely via telehealth at home address in [place] and stated they are in a secure environment for this session. The patient's condition being diagnosed/treated is appropriate for telemedicine. The provider identified herself as well as her credentials. The patient, and/or patients guardian, consent to be seen remotely, and when consent is given they understand that the consent allows for patient identifiable information to be sent to a third party as needed. They may refuse to be seen remotely at any time. The electronic data is encrypted and password protected, and the patient and/or guardian has been advised of the potential risks to privacy not withstanding such measures.     You have chosen to receive care through a telehealth visit.  Do you consent to use a video/audio connection for your medical care today? Yes    PROGRESS NOTE  Data:  Sigrid Bello is a 53 y.o. female who presents today for follow up    Chief Complaint: Depression, PTSD    History of Present Illness: Patient reports fluctuating anxiety and depression through the week. Patient reports experiencing wide range of emotions leading into court date with boyfriend who had bee removed from residence. Patient reports going to court and having EPO lifted. Patient reports that they have reconciled and he has moved back in to their home. Patient reports having difficulty with landlord who is wanting to evict them. Patient reports stress due to the rental property not being fixed and her not retuning to work yet due  to getting sick after a brief return. Patient reports experiencing back pain when trying to clean the yard. Patient reports healing up from the surgery well and not experiencing a lot of pain in that area. Patient reports trying to to work towards increasing daily activities and finding a new place to live.           Clinical Maneuvering/Intervention: CBT, MI, Patient Centered    (Scales based on 0 - 10 with 10 being the worst)  Depression: 10 Anxiety: 8       Assisted patient in processing above session content; acknowledged and normalized patient's thoughts, feelings, and concerns.  Rationalized patient thought process regarding recent stressors and life events. Discussed triggers associated with patient's emotions. Utilized CBT to process through and correct irrational cognitions with emphasis on  constant thinking impacting action . Also discussed coping skills for patient to implement. Discussed setting short-term goals and increasing movement as well as exercise after her upcoming ablation appointment.  Processed through thoughts and emotions surrounding stress related to court with boyfriend and landlord attempting to evict him from property.  Discussed setting short-term goals, increasing amount of activity, and increasing self-care.    Allowed patient to freely discuss issues without interruption or judgment. Provided safe, confidential environment to facilitate the development of positive therapeutic relationship and encourage open, honest communication. Assisted patient in identifying risk factors which would indicate the need for higher level of care including thoughts to harm self or others and/or self-harming behavior and encouraged patient to contact this office, call 911, or present to the nearest emergency room should any of these events occur. Discussed crisis intervention services and means to access. Patient adamantly and convincingly denies current suicidal or homicidal ideation or perceptual  disturbance.    Assessment:   Assessment   Patient appears to maintain relative stability as compared to their baseline.  However, patient continues to struggle with anxiety and depression which continues to cause impairment in important areas of functioning.  A result, they can be reasonably expected to continue to benefit from treatment and would likely be at increased risk for decompensation otherwise.    Mental Status Exam:   Hygiene:   good  Cooperation:  Cooperative  Eye Contact:  Good  Psychomotor Behavior:  Appropriate  Affect:  Full range  Mood: fluctates  Speech:  Normal  Thought Process:  Linear  Thought Content:  Mood congruent  Suicidal:  None  Homicidal:  None  Hallucinations:  None  Delusion:  None  Memory:  Intact  Orientation:  Person, Place, Time and Situation  Reliability:  fair  Insight:  Fair  Judgement:  Fair  Impulse Control:  Fair  Physical/Medical Issues:  No        Patient's Support Network Includes:  significant other and extended family    Functional Status: Mild impairment     Progress toward goal: Patient is working towards practicing the ABC's of CBT model while at home to process through and correct or improve cognitions. Patient is making progress on processing thoughts and emotions during sessions, utilizing positive thinking strategies, daily positive affirmations and self care practices.     Prognosis: Good with Ongoing Treatment            Plan:    Patient will continue in individual outpatient therapy with focus on improved functioning and coping skills, maintaining stability, and avoiding decompensation and the need for higher level of care.    Patient will adhere to medication regimen as prescribed and report any side effects. Patient will contact this office, call 911 or present to the nearest emergency room should suicidal or homicidal ideations occur. Provide Cognitive Behavioral Therapy and Solution Focused Therapy to improve functioning, maintain stability, and avoid  decompensation and the need for higher level of care.     Return in about 2 weeks, or earlier if symptoms worsen or fail to improve.           VISIT DIAGNOSIS:     ICD-10-CM ICD-9-CM   1. Moderate episode of recurrent major depressive disorder  F33.1 296.32   2. Post traumatic stress disorder (PTSD)  F43.10 309.81        Diagnoses and all orders for this visit:    1. Moderate episode of recurrent major depressive disorder (Primary)    2. Post traumatic stress disorder (PTSD)           [facility] No Show Policy:  We understand unexpected circumstances arise; however, anytime you miss your appointment we are unable to provide you appropriate care.  In addition, each appointment missed could have been used to provide care for others.  We ask that you call at least 24 hours in advance to cancel or reschedule an appointment.  We would like to take this opportunity to remind you of our policy stating patients who miss THREE or more appointments without cancelling or rescheduling 24 hours in advance of the appointment may be subject to cancellation of any further visits with our clinic and recommendation to seek in-person services/visits.    Please call [phone] to reschedule your appointment. If there are reasons that make it difficult for you to keep the appointments, please call and let us know how we can help.  Please understand that medication prescribing will not continue without seeing your provider.      [facility]'s No Show Policy reviewed with patient at today's visit. Patient verbalized understanding of this policy. Discussed with patient that in the event that there are three or more no show visits, it will be recommended that they pursue in-person services/visits as noncompliance with telehealth visits indicates that patient is not an appropriate candidate for telemedicine and would likely be more appropriate for in-person services/visits. Patient verbalizes  understanding and is agreeable to this.       This document has been electronically signed by Yury Felix III, MARCELOW  October 10, 2023 12:53 EDT      Part of this note may be an electronic transcription/translation of spoken language to printed text using the Dragon Dictation System.

## 2023-10-10 NOTE — TELEPHONE ENCOUNTER
Caller: Sigrid Bello    Relationship: Self    Best call back number: [phone]    What is the best time to reach you: ANY TIME    Who are you requesting to speak with (clinical staff, provider,  specific staff member): NURSE    Do you know the name of the person who called: SELF    What was the call regarding: PATIENT CHECKING TO SEE IF OFFICE RECEIVED [employer] PAPERWORK FROM HER JOB. PLEASE ADVISE    Is it okay if the provider responds through MyChart: YES

## 2023-10-13 ENCOUNTER — OFFICE VISIT (OUTPATIENT)
Dept: NEUROLOGY | Facility: CLINIC | Age: 53
End: 2023-10-13
Payer: COMMERCIAL

## 2023-10-13 ENCOUNTER — TELEPHONE (OUTPATIENT)
Dept: INTERNAL MEDICINE | Facility: CLINIC | Age: 53
End: 2023-10-13
Payer: COMMERCIAL

## 2023-10-13 VITALS
HEART RATE: 105 BPM | WEIGHT: 182 LBS | SYSTOLIC BLOOD PRESSURE: 128 MMHG | HEIGHT: 66 IN | OXYGEN SATURATION: 96 % | DIASTOLIC BLOOD PRESSURE: 74 MMHG | BODY MASS INDEX: 29.25 KG/M2

## 2023-10-13 DIAGNOSIS — G43.119 INTRACTABLE MIGRAINE WITH AURA WITHOUT STATUS MIGRAINOSUS: Primary | ICD-10-CM

## 2023-10-13 DIAGNOSIS — R56.9 GENERALIZED CONVULSIVE SEIZURES: ICD-10-CM

## 2023-10-13 RX ORDER — GABAPENTIN 300 MG/1
300 CAPSULE ORAL 3 TIMES DAILY
COMMUNITY

## 2023-10-13 RX ORDER — PRAZOSIN HYDROCHLORIDE 5 MG/1
CAPSULE ORAL
COMMUNITY
Start: 2023-10-04

## 2023-10-13 RX ORDER — LEVETIRACETAM 750 MG/1
750 TABLET ORAL 2 TIMES DAILY
Qty: 60 TABLET | Refills: 2 | Status: SHIPPED | OUTPATIENT
Start: 2023-10-13 | End: 2024-01-11

## 2023-10-13 NOTE — Clinical Note
Megha Matta believes that she has had an MRI brain at [facility] in the last year, can we try to obtain those records? Thanks

## 2023-10-13 NOTE — PROGRESS NOTES
Neuro Office Visit      Encounter Date: 10/13/2023   Patient Name: Sigrid Bello  : 1970   MRN: [id number]   PCP:  Roverto Israel APRN     Chief Complaint:    Chief Complaint   Patient presents with    Seizures       History of Present Illness: Sigrid Bello is a 53 y.o. female who is here today in Neurology for  migraines and seizures    Initial visit 2023:  Sigrid Bello is a 52 y.o. female who is here today in Neurology to establish care for seizures and migraines.   She was referred by Pamela HECTOR.     She has a past medical history of seizures, lupus, fibromyalgia, diabetes mellitus, hypertension, atrial fibrillation.  Patient was seen at [facility] on 2023 due to complaint of seizure, patient reported 3 seizures prior to presentation,  reports patient was asleep during all 3 seizures.  She apparently has been taking gabapentin for seizure disorder but it has been making her feel sick, she reports feeling lightheaded and weak.  Per her  the seizures were characterized by her face and upper body slightly shaking for a few seconds at a time, approximately 5 minutes apart for all 3 episodes, patient reports that she takes gabapentin for seizure disorder and has chronic pain.  She does not have a neurologist, she reports her neurologist moved states.  Also noted a headache along with her pain from fibromyalgia.  She had reported the gabapentin was making her feel nauseous.  While in the ED she reported that she had a migraine for which she was treated and discharged home.    Urinalysis was negative for leukocytes, nitrite, protein, glucose, ketones, 0-2 WBC, 1+ bacteria, 3-5 squamous epithelial,  WBC 6.1.  While in the ED she received Benadryl, Toradol, and Reglan for her headache.  Since discharge she has followed up with her family medicine nurse who saw her for migraines and seizures.  While at her primary care appointment she  reported that her migraines are not well controlled, she has a prescription for sumatriptan which is not effective for migraine activity, she recently tried Nurtec which was not effective, she reported that her migraines were almost daily and causing her the inability to work.  She was given prescription for Ubrelvy.      Patient states that over the last 2 weeks she has had nausea and vomiting and that she often loses her afternoon gabapentin dose secondary to vomiting, she states that she has been following with gynecology for a mass on her uterus that she has been told is cantaloupe sized, she had a biopsy yesterday and will be hearing from her gynecologist with probable plan for surgery in June.  She states that she has been on gabapentin for seizures for approximately 6 years, states that she was diagnosed with seizures of the grand mal type when she was 20 years old states that she has tried multiple medications on and off over the years, does recall that she took Topamax which did not work well to prevent seizures.  She previously received care at [facility] neurology but the provider that she is previously saw has left.  She is here today to establish new neurology care.  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  She states that she had received Botox injections for her both her spine and for migraines however she had stopped doing injections for her migraines after she had significant eye ptosis of her left eye.,  She states that she did have 6 rounds of Botox.  She was seen at [facility] on 4/24/2023 after her  witnessed her having 3 seizure episodes, he stated that they were very short in duration only a few seconds long, and that they happened in quick succession approximately 5 minutes apart.  She did have associated urinary incontinence, no tongue bite, significant other describes the episode as tightening of her arms and then  generalized body shaking.  Patient also thinks that she may have had another seizure the other day.  She states that she was premature as an infant and may have had developmental delays but she is unsure.  She has not had an EEG in over 10 years.  She does believe that [facility] did perform an MRI of her brain within the last month, this imaging was not available for review per EMR, patient is going to contact [facility] to try to get those records shared with our office.     Regarding her migraines she has had migraines for many years, she states that for the last 3 years she has kept a daily migraine, describes the pain as sharp and aching, states that it is holoacranial, it is associated with light and sound sensitivity along with nausea.  She also does have occasional aura and blurry vision with her migraine.  She does not know of any triggers states that rest does help.  She has tried several triptans including both rizatriptan and sumatriptan, her prior neurologist had recently switched her from rizatriptan to sumatriptan, she states that she has previously tried Nurtec and it did not help, most recently her primary care has prescribed Ubrelvy to be taken for abortive treatment for migraines.     She also reports very poor sleep.  She has an extensive psychiatric history and does take several psychotropic medications.  She is requesting referral to psychiatry for management of her medications.  She also has a history of fibromyalgia and is requesting referral to pain management for this.      Current visit 10/13/2023:  Sigrid returns to neurology clinic for continued evaluation of migraines and seizures. She reports that Lolita is working well , no SE, she is now having 2 or 3 migraines per month which is a significant improvement from her initial visit in which she was having daily migraines. She has been taking Ubrelvy for abortive treatment of migraines and reports that it helps but it doesn't  always get rid of the migraine. She has been taking 1 dose of ubrelvy with a migraine, she has never taken a second dose. Keppra was also started at her initial clinic visit, she has been taking Keppra 500 mg BID. EEG was previously ordered but has not been performed. Keppra is working well overall, she is in clinic with her significant other who reports some breakthrough seizures once or twice per month - described as generalized shaking - typically at night, also has night terrors and he has a hard time differentiating between seizures and night terrors. Denies any SE from Keppra. She is following with pain management - going to [facility] pain [facility] spine. She reports that she is still taking Gabapentin 300 mg TID for fibromyalgia. She is also following with psychiatry.     Subjective      Review of Systems   Constitutional:  Positive for fatigue.   Eyes: Negative.    Respiratory: Negative.     Cardiovascular: Negative.    Gastrointestinal: Negative.    Endocrine: Negative.    Genitourinary: Negative.    Musculoskeletal:  Positive for arthralgias.   Skin:  Positive for rash.   Allergic/Immunologic: Positive for food allergies.   Neurological:  Positive for seizures and headaches.   Hematological: Negative.    Psychiatric/Behavioral:  Positive for sleep disturbance.           Past Medical History:   Past Medical History:   Diagnosis Date    ADHD (attention deficit hyperactivity disorder)     Anemia     Anxiety and depression     Arthritis     Asthma     Atrial fibrillation     Blastomycosis     Diabetes mellitus     Difficulty walking     Fibromyalgia     Fibromyalgia, primary     Head injury     HL (hearing loss)     Hyperlipidemia     Hypertension     Hypokalemia     Low back pain     Lupus     Migraine     MVP (mitral valve prolapse)     Obesity     PTSD (post-traumatic stress disorder)     Seizures     Last seizure March 2023    Shingles     Sleep apnea     No CPAP    Syncope     Tachycardia     Uterine mass         Past Surgical History:   Past Surgical History:   Procedure Laterality Date    ANKLE SURGERY      both ankles rebuilt    INNER EAR SURGERY      KNEE SURGERY      TEETH EXTRACTION      TOTAL LAPAROSCOPIC HYSTERECTOMY SALPINGECTOMY N/A 7/20/2023    Procedure: TOTAL LAPAROSCOPIC HYSTERECTOMY BILATERAL SALPINGECTOMY WITH DAVINCI ROBOT;  Surgeon: Monisha Greene MD;  Location:  [facility];  Service: Robotics - DaVinci;  Laterality: N/A;    UVULECTOMY      VAGINAL REPAIR N/A 7/20/2023    Procedure: VAGINAL SIDEWALL LACERATION REPAIR LEFT;  Surgeon: Monisha Greene MD;  Location:  [facility];  Service: Obstetrics/Gynecology;  Laterality: N/A;       Family History:   Family History   Problem Relation Age of Onset    Anxiety disorder Father     Breast cancer Maternal Grandmother     Dementia Maternal Grandmother     Asthma Paternal Grandfather     Cancer Paternal Grandfather         Jack    Depression Paternal Grandfather     Diabetes Paternal Grandfather     Hearing loss Paternal Grandfather     Ovarian cancer Neg Hx        Social History:   Social History     Socioeconomic History    Marital status: Single   Tobacco Use    Smoking status: Never     Passive exposure: Never    Smokeless tobacco: Never   Vaping Use    Vaping Use: Never used   Substance and Sexual Activity    Alcohol use: Not Currently     Alcohol/week: 1.0 standard drink of alcohol     Types: 1 Glasses of wine per week    Drug use: No    Sexual activity: Not Currently     Partners: Male     Birth control/protection: None       Medications:     Current Outpatient Medications:     acetaminophen (TYLENOL) 325 MG tablet, Take 2 tablets by mouth Every 4 (Four) Hours As Needed for Mild Pain. Take every 4 hours while awake for 7 days then only as needed., Disp: 100 tablet, Rfl: 0    albuterol sulfate  (90 Base) MCG/ACT inhaler, Inhale 2 puffs Every 4 (Four) Hours As Needed for Wheezing., Disp: , Rfl:     aspirin 81 MG EC tablet, Take 1 tablet by mouth  Daily., Disp: , Rfl:     benzonatate (Tessalon Perles) 100 MG capsule, Take 1 capsule by mouth 3 (Three) Times a Day As Needed for Cough., Disp: 90 capsule, Rfl: 1    busPIRone (BUSPAR) 5 MG tablet, Take 1 tablet by mouth 3 (Three) Times a Day., Disp: 90 tablet, Rfl: 2    Continuous Blood Gluc  (Dexcom G7 ) device, 1 each Daily., Disp: 1 each, Rfl: 0    Continuous Blood Gluc Sensor (Dexcom G7 Sensor) misc, 1 each Every 10 (Ten) Days., Disp: 3 each, Rfl: 11    cyclobenzaprine (FLEXERIL) 10 MG tablet, Take 1 tablet by mouth 3 (Three) Times a Day As Needed for Muscle Spasms., Disp: 12 tablet, Rfl: 0    diphenhydrAMINE (BENADRYL) 25 MG tablet, Take 1 tablet by mouth At Night As Needed for Itching., Disp: , Rfl:     DULoxetine (Cymbalta) 30 MG capsule, Take 3 capsules by mouth Daily for 90 days., Disp: 90 capsule, Rfl: 2    EPINEPHrine 0.1 MG/0.1ML solution auto-injector, Inject  as directed., Disp: , Rfl:     ferrous sulfate 324 (65 Fe) MG tablet delayed-release EC tablet, Take 1 tablet by mouth 2 (Two) Times a Day With Meals. 2 tabs BID, Disp: , Rfl:     fluticasone-salmeterol (ADVAIR) 250-50 MCG/DOSE DISKUS, Inhale 2 puffs As Needed., Disp: , Rfl:     Fremanezumab-vfrm (Ajovy) 225 MG/1.5ML solution auto-injector, Inject 225 mg under the skin into the appropriate area as directed Every 30 (Thirty) Days., Disp: 1.5 mL, Rfl: 11    glucagon (GLUCAGEN) 1 MG injection, Inject 1 mg into the appropriate muscle as directed by prescriber., Disp: , Rfl:     HumaLOG KwikPen 100 UNIT/ML solution pen-injector, Take 5-10 units TID with meals sliding scale NO MORE THAN 60UNITS DAILY, Disp: 500 mL, Rfl: 5    hydrOXYzine (ATARAX) 10 MG tablet, Take 1 tablet by mouth Every 4 (Four) Hours As Needed for Anxiety., Disp: 60 tablet, Rfl: 5    ibuprofen (ADVIL,MOTRIN) 600 MG tablet, Take 1 tablet by mouth 3 (Three) Times a Day., Disp: 15 tablet, Rfl: 0    Insulin Glargine (LANTUS SOLOSTAR) 100 UNIT/ML injection pen, Inject 10  Units under the skin into the appropriate area as directed 2 (Two) Times a Day., Disp: 6 mL, Rfl: 2    levETIRAcetam (KEPPRA) 750 MG tablet, Take 1 tablet by mouth 2 (Two) Times a Day for 90 days., Disp: 60 tablet, Rfl: 2    Magnesium 200 MG chewable tablet, Chew 1 tablet Daily., Disp: , Rfl:     metoprolol tartrate (LOPRESSOR) 50 MG tablet, Take 1 tablet by mouth 2 (Two) Times a Day., Disp: 180 tablet, Rfl: 3    nitroglycerin (NITROSTAT) 0.6 MG SL tablet, Place 1 tablet under the tongue Every 5 (Five) Minutes As Needed for Chest Pain., Disp: , Rfl:     ondansetron ODT (ZOFRAN-ODT) 4 MG disintegrating tablet, Place 1 tablet on the tongue Every 8 (Eight) Hours As Needed for Nausea or Vomiting., Disp: 30 tablet, Rfl: 1    Potassium Chloride (KLOR-CON 10 PO), Take 1 tablet by mouth 2 (two) times a day., Disp: , Rfl:     prazosin (MINIPRESS) 5 MG capsule, TAKE 2 CAPSULES BY MOUTH ONCE DAILY AT NIGHT, Disp: , Rfl:     promethazine-dextromethorphan (PROMETHAZINE-DM) 6.25-15 MG/5ML syrup, Take 5 mL by mouth 4 (Four) Times a Day As Needed for Cough., Disp: 118 mL, Rfl: 0    sertraline (Zoloft) 50 MG tablet, Take 1 tablet by mouth Daily for 90 days., Disp: 30 tablet, Rfl: 2    traZODone (DESYREL) 50 MG tablet, Take 1-2 tablets PO 30 minutes prior to bedtime, Disp: 60 tablet, Rfl: 5    ubrogepant (UBRELVY) 100 MG tablet, Take 1 tablet by mouth 1 (One) Time As Needed (migraine) for up to 30 doses., Disp: 16 tablet, Rfl: 3    Unable to find, 1 each As Needed. Med Name: Hemp Larry, Disp: , Rfl:     vitamin D (ERGOCALCIFEROL) 1.25 MG (63350 UT) capsule capsule, Take 1 capsule by mouth 1 (One) Time Per Week. Saturday's, Disp: , Rfl:     gabapentin (NEURONTIN) 300 MG capsule, Take 1 capsule by mouth 3 (Three) Times a Day. Indications: Fibromyalgia, Disp: , Rfl:     Sod Picosulfate-Mag Ox-Cit Acd (Clenpiq) 10-3.5-12 MG-GM -GM/160ML solution, Take 350 mL by mouth Take As Directed. (Patient not taking: Reported on 10/13/2023), Disp:  "350 mL, Rfl: 0    Allergies:   Allergies   Allergen Reactions    Meperidine Anaphylaxis and Unknown (See Comments)     unknown    Morphine Shortness Of Breath and Unknown (See Comments)    Mushroom Anaphylaxis    Peanut Oil Anaphylaxis    Penicillins Unknown (See Comments) and Anaphylaxis     Childhood reaction    Iodine Hives    Tree Nut Hives    Cortisone Hives and Rash    Other Hives     [facility]        Objective     Objective:    /74   Pulse 105   Ht 167.6 cm (65.98\")   Wt 82.6 kg (182 lb)   LMP 07/14/2023   SpO2 96%   BMI 29.39 kg/mý   Body mass index is 29.39 kg/mý.    Physical Exam  Vitals reviewed.   Constitutional:       Appearance: Normal appearance.   HENT:      Head: Normocephalic and atraumatic.      Mouth/Throat:      Mouth: Mucous membranes are moist.      Pharynx: Oropharynx is clear.   Pulmonary:      Effort: Pulmonary effort is normal. No respiratory distress.   Skin:     General: Skin is warm and dry.      Comments: Several areas of open sores/erythema   Neurological:      Mental Status: She is alert.          Neurology Exam:    General apperance: NAD.     Mental status: Alert, awake and oriented to time place and person.    Fund of knowledge:  Normal.     Language and Speech: No aphasia or dysarthria.    Naming , Repetition and Comprehension:  Can name objects, repeat a sentence and follow commands. Speech is clear and fluent with good repetition, comprehension, and naming.    Cranial Nerves:   CN II: Visual fields are full. Intact.  Pupils - PERRLA  CN III, IV and VI: Extraocular movements are intact. Normal saccades.   CN V: Facial sensation is intact.   CN VII: Muscles of facial expression reveal no asymmetry. Intact.   CN VIII: Hearing is intact. Whispered voice intact.   CN IX and X: Palate elevates symmetrically. Intact  CN XI: Shoulder shrug is intact.   CN XII: Tongue is midline without evidence of atrophy or fasciculation.     Motor:  Right UE muscle strength 5/5. Normal " tone.     Left UE muscle strength 5/5. Normal tone.      Right LE muscle strength 5/5. Normal tone.     Left LE muscle strength 5/5. Normal tone.      Sensory: Normal light touch sensation bilaterally.    DTRs: 2+ bilaterally in upper and lower extremities.    Coordination: Normal finger-to-nose, heel to shin B/L.    Gait: Normal.      Results:   Imaging:   XR Hand 3+ View Right    Result Date: 10/6/2023  Impression: Soft tissue swelling of the third finger proximal interphalangeal joint. Unremarkable appearance of the osseous structures. Electronically Signed: Donny Tsai MD  10/6/2023 12:10 PM EDT  Workstation ID: [id number]    XR Chest 1 View    Result Date: 9/27/2023  Impression: No evidence of acute cardiopulmonary disease on this portable radiograph. Electronically Signed: Matthew Lr MD  9/27/2023 3:44 PM EDT  Workstation ID: [id number]    XR Spine Lumbar 2 or 3 View    Result Date: 9/4/2023  Impression: Unremarkable lumbar spine Electronically Signed: Brian Arboleda MD  9/4/2023 1:03 PM CDT  Workstation ID: [id number]       Labs:   Lab Results   Component Value Date    GLUCOSE 79 09/12/2023    BUN 15 09/12/2023    CREATININE 0.87 09/12/2023    EGFR 80.8 09/12/2023    BCR 17.2 09/12/2023    K 4.2 09/12/2023    CO2 21.8 (L) 09/12/2023    CALCIUM 9.0 09/12/2023    ALBUMIN 3.9 09/12/2023    BILITOT 0.2 09/12/2023    AST 17 09/12/2023    ALT 10 09/12/2023         Assessment / Plan      Assessment/Plan:   Diagnoses and all orders for this visit:    1. Intractable migraine with aura without status migrainosus (Primary)    2. Generalized convulsive seizures  -     levETIRAcetam (KEPPRA) 750 MG tablet; Take 1 tablet by mouth 2 (Two) Times a Day for 90 days.  Dispense: 60 tablet; Refill: 2       Sigrid Bello returns to neurology clinic for continued evaluation of migraines and seizures. She was started on Ajovy 225mg monthly injections at her initial clinic visit, she has dramatically improved from daily migraine  to migraines 2-3 times/month, no SE with Ajovy. For abortive treatment she has been taking Ubrelvy, she has only been taking 1 dose at migraine onset with moderate relief, I have instructed her that she can take a second dose of Ubrelvy 2 hours after initial dose, no more than 2 doses in 24 hours. We will again request outside MRI brain records - patient indicates that she had MRI at [place]. Regarding seizures she reports significant improvement in seizure frequency with a few breakthrough seizures since starting Keppra 500 mg BID, Keppra has been well tolerated, will increase dose to 750 mg BID to try to prevent the breakthrough seizures she has been experiencing. EEG has not been completed, she will again be contacted to schedule this test. She has been instructed not to drive for 90 days from any seizure activity. Since she is doing well overall we will plan to see Sigrid back in clinic in about 3 months or sooner for any questions or concerns.       Patient Education:       Reviewed medications, potential side effects and signs and symptoms to report. Discussed risk versus benefits of treatment plan with patient and/or family-including medications, labs and radiology that may be ordered. Addressed questions and concerns during visit. Patient and/or family verbalized understanding and agree with plan. Instructed to call the office with any questions and report to ER with any life-threatening symptoms.     Follow Up:   Return in about 3 months (around 1/13/2024).    I spent 25 minutes face to face with the patient. I personally spent 50 percent of this time counseling and discussing diagnosis, evaluation, driving, treatment options, and management .       During this visit the following were done:  Labs Reviewed []    Labs Ordered []    Radiology Reports Reviewed []    Radiology Ordered []    PCP Records Reviewed []    Referring Provider Records Reviewed []    ER Records Reviewed []    Hospital Records  Reviewed []    History Obtained From Family []    Radiology Images Reviewed []    Other Reviewed []    Records Requested [x]      TAWNY Rodriguez  [facility]  [address] [address] [phone]

## 2023-10-13 NOTE — TELEPHONE ENCOUNTER
Pt would like a referral to psychiatry so she can get her vraylar called in b/c she is completely out and has been for a week now. Pt knows her provider is out and wont be back until next week, please advise

## 2023-10-19 ENCOUNTER — TELEMEDICINE (OUTPATIENT)
Dept: FAMILY MEDICINE CLINIC | Facility: TELEHEALTH | Age: 53
End: 2023-10-19
Payer: COMMERCIAL

## 2023-10-19 DIAGNOSIS — L02.211 CUTANEOUS ABSCESS OF ABDOMINAL WALL: Primary | ICD-10-CM

## 2023-10-19 RX ORDER — MUPIROCIN CALCIUM 20 MG/G
1 CREAM TOPICAL 3 TIMES DAILY
Qty: 30 G | Refills: 0 | Status: SHIPPED | OUTPATIENT
Start: 2023-10-19

## 2023-10-19 RX ORDER — SULFAMETHOXAZOLE AND TRIMETHOPRIM 800; 160 MG/1; MG/1
1 TABLET ORAL 2 TIMES DAILY
Qty: 20 TABLET | Refills: 0 | Status: SHIPPED | OUTPATIENT
Start: 2023-10-19 | End: 2023-10-29

## 2023-10-19 NOTE — PROGRESS NOTES
You have chosen to receive care through a telehealth visit.  Do you consent to use a video/audio connection for your medical care today? Yes     CHIEF COMPLAINT  No chief complaint on file.        HPI  Sigrid Bello is a 53 y.o. female  presents with complaint of sores on abdomen that start like pimples, she has been picking at them.  Painful to touch.  Has been putting antibiotic ointment on them.  Denies injury.    Review of Systems  See HPI    Past Medical History:   Diagnosis Date    ADHD (attention deficit hyperactivity disorder)     Anemia     Anxiety and depression     Arthritis     Asthma     Atrial fibrillation     Blastomycosis     Diabetes mellitus     Difficulty walking     Fibromyalgia     Fibromyalgia, primary     Head injury     HL (hearing loss)     Hyperlipidemia     Hypertension     Hypokalemia     Low back pain     Lupus     Migraine     MVP (mitral valve prolapse)     Obesity     PTSD (post-traumatic stress disorder)     Seizures     Last seizure March 2023    Shingles     Sleep apnea     No CPAP    Syncope     Tachycardia     Uterine mass        Family History   Problem Relation Age of Onset    Anxiety disorder Father     Breast cancer Maternal Grandmother     Dementia Maternal Grandmother     Asthma Paternal Grandfather     Cancer Paternal Grandfather         Mike    Depression Paternal Grandfather     Diabetes Paternal Grandfather     Hearing loss Paternal Grandfather     Ovarian cancer Neg Hx        Social History     Socioeconomic History    Marital status: Single   Tobacco Use    Smoking status: Never     Passive exposure: Never    Smokeless tobacco: Never   Vaping Use    Vaping Use: Never used   Substance and Sexual Activity    Alcohol use: Not Currently     Alcohol/week: 1.0 standard drink of alcohol     Types: 1 Glasses of wine per week    Drug use: No    Sexual activity: Not Currently     Partners: Male     Birth control/protection: None       Sigrid Bello  reports  that she has never smoked. She has never been exposed to tobacco smoke. She has never used smokeless tobacco..               LMP 07/14/2023     PHYSICAL EXAM  Physical Exam   Constitutional: She is oriented to person, place, and time. She appears well-developed and well-nourished. She does not have a sickly appearance. She does not appear ill.   HENT:   Head: Normocephalic and atraumatic.   Pulmonary/Chest: Effort normal.  No respiratory distress.  Neurological: She is alert and oriented to person, place, and time.   Skin:   3 open sores on mid abdomen, with surrounding erythema and drainage, painful to touch           Diagnoses and all orders for this visit:    1. Cutaneous abscess of abdominal wall (Primary)  -     sulfamethoxazole-trimethoprim (Bactrim DS) 800-160 MG per tablet; Take 1 tablet by mouth 2 (Two) Times a Day for 10 days.  Dispense: 20 tablet; Refill: 0  -     mupirocin (BACTROBAN) 2 % cream; Apply 1 application  topically to the appropriate area as directed 3 (Three) Times a Day.  Dispense: 30 g; Refill: 0    --take medications as prescribed  --increase fluids, rest as needed, tylenol or ibuprofen for pain  --f/u in 3-5 days with PCP for further evaluation        FOLLOW-UP  As discussed during visit with PCP/[facility] if no improvement or Urgent Care/Emergency Department if worsening of symptoms    Patient verbalizes understanding of medication dosage, comfort measures, instructions for treatment and follow-up.    TAWNY Flanagan  10/19/2023  17:50 EDT    The use of a video visit has been reviewed with the patient and verbal informed consent has been obtained. Myself and Sigrid Bello participated in this visit. The patient is located in [address] [place] [address].    I am located in [place], [place]. [employer] and [employer] were utilized. I spent 8 minutes in the patient's chart for this visit.

## 2023-10-26 ENCOUNTER — APPOINTMENT (OUTPATIENT)
Dept: GENERAL RADIOLOGY | Facility: HOSPITAL | Age: 53
End: 2023-10-26
Payer: COMMERCIAL

## 2023-10-26 ENCOUNTER — HOSPITAL ENCOUNTER (EMERGENCY)
Facility: HOSPITAL | Age: 53
Discharge: HOME | End: 2023-10-26
Attending: EMERGENCY MEDICINE
Payer: COMMERCIAL

## 2023-10-26 VITALS
RESPIRATION RATE: 18 BRPM | TEMPERATURE: 98.8 F | BODY MASS INDEX: 29.25 KG/M2 | HEIGHT: 66 IN | DIASTOLIC BLOOD PRESSURE: 73 MMHG | OXYGEN SATURATION: 98 % | SYSTOLIC BLOOD PRESSURE: 122 MMHG | HEART RATE: 104 BPM | WEIGHT: 182 LBS
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
## 2023-10-26 DIAGNOSIS — S61.451A DOG BITE OF RIGHT HAND, INITIAL ENCOUNTER: Primary | ICD-10-CM

## 2023-10-26 PROCEDURE — 99283 EMERGENCY DEPT VISIT LOW MDM: CPT

## 2023-10-26 PROCEDURE — 73130 X-RAY EXAM OF HAND: CPT

## 2023-10-26 RX ORDER — METRONIDAZOLE 500 MG/1
500 TABLET ORAL 3 TIMES DAILY
Qty: 15 TABLET | Refills: 0 | Status: SHIPPED | OUTPATIENT
Start: 2023-10-26

## 2023-10-26 RX ORDER — METRONIDAZOLE 500 MG/1
500 TABLET ORAL ONCE
Status: COMPLETED | OUTPATIENT
Start: 2023-10-26 | End: 2023-10-26

## 2023-10-26 RX ORDER — DOXYCYCLINE 100 MG/1
100 CAPSULE ORAL 2 TIMES DAILY
Qty: 10 CAPSULE | Refills: 0 | Status: SHIPPED | OUTPATIENT
Start: 2023-10-26

## 2023-10-26 RX ORDER — DOXYCYCLINE 100 MG/1
100 CAPSULE ORAL ONCE
Status: COMPLETED | OUTPATIENT
Start: 2023-10-26 | End: 2023-10-26

## 2023-10-26 RX ADMIN — DOXYCYCLINE 100 MG: 100 CAPSULE ORAL at 16:05

## 2023-10-26 RX ADMIN — METRONIDAZOLE 500 MG: 500 TABLET ORAL at 16:05

## 2023-10-26 NOTE — ED PROVIDER NOTES
Subjective   History of Present Illness  53-year-old female presents emergency department today with a dog bite to her hand.  She has a new puppy the dog bit her on the hand latched on but heart of the thought got a couple small lacerations to the dorsal aspect.  She has no numbness or ting no weakness no foreign body sensation.    History provided by:  Patient   used: No    Animal Bite  Contact animal:  Dog  Animal bite location: Right hand dorsal.  Time since incident:  2 hours  Pain details:     Quality:  Burning    Severity:  Moderate    Timing:  Constant    Progression:  Unchanged  Incident location:  Home  Provoked: provoked    Animal's rabies vaccination status:  Up to date  Animal in possession: yes    Tetanus status:  Up to date  Relieved by:  Nothing  Worsened by:  Nothing  Ineffective treatments:  None tried  Associated symptoms: no fever, no numbness, no rash and no swelling        Review of Systems   Constitutional:  Negative for chills and fever.   Respiratory:  Negative for chest tightness, shortness of breath and wheezing.    Cardiovascular:  Negative for chest pain and palpitations.   Gastrointestinal:  Negative for abdominal pain, diarrhea, nausea and vomiting.   Musculoskeletal:  Negative for back pain and neck pain.   Skin:  Negative for rash.   Neurological:  Negative for numbness.   Psychiatric/Behavioral: Negative.     All other systems reviewed and are negative.      Past Medical History:   Diagnosis Date    ADHD (attention deficit hyperactivity disorder)     Anemia     Anxiety and depression     Arthritis     Asthma     Atrial fibrillation     Blastomycosis     Diabetes mellitus     Difficulty walking     Fibromyalgia     Fibromyalgia, primary     Head injury     HL (hearing loss)     Hyperlipidemia     Hypertension     Hypokalemia     Low back pain     Lupus     Migraine     MVP (mitral valve prolapse)     Obesity     PTSD (post-traumatic stress disorder)     Seizures      Last seizure March 2023    Shingles     Sleep apnea     No CPAP    Syncope     Tachycardia     Uterine mass        Allergies   Allergen Reactions    Meperidine Anaphylaxis and Unknown (See Comments)     unknown    Morphine Shortness Of Breath and Unknown (See Comments)    Mushroom Anaphylaxis    Peanut Oil Anaphylaxis    Penicillins Unknown (See Comments) and Anaphylaxis     Childhood reaction    Iodine Hives    Tree Nut Hives    Cortisone Hives and Rash    Other Hives     Mayonnaise        Past Surgical History:   Procedure Laterality Date    ANKLE SURGERY      both ankles rebuilt    INNER EAR SURGERY      KNEE SURGERY      TEETH EXTRACTION      TOTAL LAPAROSCOPIC HYSTERECTOMY SALPINGECTOMY N/A 7/20/2023    Procedure: TOTAL LAPAROSCOPIC HYSTERECTOMY BILATERAL SALPINGECTOMY WITH DAVINCI ROBOT;  Surgeon: Monisha Greene MD;  Location:  [facility];  Service: Robotics - DaVinci;  Laterality: N/A;    UVULECTOMY      VAGINAL REPAIR N/A 7/20/2023    Procedure: VAGINAL SIDEWALL LACERATION REPAIR LEFT;  Surgeon: Monisha Greene MD;  Location:  [facility];  Service: Obstetrics/Gynecology;  Laterality: N/A;       Family History   Problem Relation Age of Onset    Anxiety disorder Father     Breast cancer Maternal Grandmother     Dementia Maternal Grandmother     Asthma Paternal Grandfather     Cancer Paternal Grandfather         Mike    Depression Paternal Grandfather     Diabetes Paternal Grandfather     Hearing loss Paternal Grandfather     Ovarian cancer Neg Hx        Social History     Socioeconomic History    Marital status: Single   Tobacco Use    Smoking status: Never     Passive exposure: Never    Smokeless tobacco: Never   Vaping Use    Vaping Use: Never used   Substance and Sexual Activity    Alcohol use: Not Currently     Alcohol/week: 1.0 standard drink of alcohol     Types: 1 Glasses of wine per week    Drug use: No    Sexual activity: Not Currently     Partners: Male     Birth control/protection: None            Objective   Physical Exam  Vitals and nursing note reviewed.   Constitutional:       General: She is not in acute distress.     Appearance: She is well-developed. She is not diaphoretic.   HENT:      Head: Normocephalic and atraumatic.      Nose: Nose normal.   Eyes:      General: No scleral icterus.     Conjunctiva/sclera: Conjunctivae normal.   Cardiovascular:      Heart sounds: No murmur heard.  Pulmonary:      Effort: Pulmonary effort is normal. No respiratory distress.   Abdominal:      Tenderness: There is no abdominal tenderness.   Musculoskeletal:         General: Normal range of motion.      Cervical back: Normal range of motion and neck supple.   Skin:     General: Skin is warm and dry.      Comments: 2 small puncture wounds are about half centimeter laceration on the dorsal aspect of the right hand.  No active bleeding.  No tendon exposed.  These are fairly superficial.  Range of motion of all digits.  Cap refill less than 2 seconds sensations intact.    Multiple other scabbed wounds on both forearms dorsal hands neck and chest from chronic picking.   Neurological:      Mental Status: She is alert and oriented to person, place, and time.   Psychiatric:         Behavior: Behavior normal.         Procedures           ED Course           No results found for this or any previous visit (from the past 24 hour(s)).  Note: In addition to lab results from this visit, the labs listed above may include labs taken at another facility or during a different encounter within the last 24 hours. Please correlate lab times with ED admission and discharge times for further clarification of the services performed during this visit.    XR Hand 3+ View Right   Final Result         1.No acute fractures or dislocations.         Electronically Signed: Juwan Gomez MD     10/26/2023 4:09 PM EDT     Workstation ID: [id number]        Vitals:    10/26/23 1457 10/26/23 1459 10/26/23 1502   BP: 122/73 122/73    Pulse: 105   "104   Resp: 18     Temp: 98.8 °F (37.1 °C)     TempSrc: Oral     SpO2: 97%  98%   Weight: 82.6 kg (182 lb)     Height: 167.6 cm (66\")       Medications   doxycycline (MONODOX) capsule 100 mg (100 mg Oral Given 10/26/23 1605)   metroNIDAZOLE (FLAGYL) tablet 500 mg (500 mg Oral Given 10/26/23 1605)     ECG/EMG Results (last 24 hours)       ** No results found for the last 24 hours. **          No orders to display     `erything                                  Medical Decision Making  No foreign body noted on x-ray.  She has a penicillin allergy and is anaphylaxis so she is being given Doxy and Flagyl.  Given a 5-day course of this.  Ice elevate return for problems    Problems Addressed:  Dog bite of right hand, initial encounter: complicated acute illness or injury    Amount and/or Complexity of Data Reviewed  Radiology: ordered. Decision-making details documented in ED Course.    Risk  Prescription drug management.        Final diagnoses:   Dog bite of right hand, initial encounter       ED Disposition  ED Disposition       ED Disposition   Discharge    Condition   Stable    Comment   --               Roverto Israel, APRN  [address] [address]  [phone]               Medication List        New Prescriptions      diclofenac 50 MG EC tablet  Commonly known as: VOLTAREN  Take 1 tablet by mouth 3 (Three) Times a Day.     doxycycline 100 MG capsule  Commonly known as: MONODOX  Take 1 capsule by mouth 2 (Two) Times a Day.     metroNIDAZOLE 500 MG tablet  Commonly known as: FLAGYL  Take 1 tablet by mouth 3 (Three) Times a Day.            Stop      Magnesium 200 MG chewable tablet     promethazine-dextromethorphan 6.25-15 MG/5ML syrup  Commonly known as: PROMETHAZINE-DM     sertraline 50 MG tablet  Commonly known as: Zoloft               Where to Get Your Medications        These medications were sent to [facility] - [place], [place] - [address] [facility] - [phone] PH - " [phone] FX  [address] [address]      Phone: [phone]   diclofenac 50 MG EC tablet  doxycycline 100 MG capsule  metroNIDAZOLE 500 MG tablet            Eb Nobles PA  10/26/23 1933

## 2023-10-26 NOTE — Clinical Note
[facility] EMERGENCY DEPARTMENT  [address]  [facility] [address]  Phone: [phone]    Sigrid Bello was seen and treated in our emergency department on 10/26/2023.  She may return to work on 10/28/2023.         Thank you for choosing [facility].    Eb Nobles PA

## 2023-10-30 RX ORDER — NITROGLYCERIN 0.6 MG/1
0.6 TABLET SUBLINGUAL
Qty: 30 TABLET | Refills: 0 | Status: SHIPPED | OUTPATIENT
Start: 2023-10-30

## 2023-10-31 ENCOUNTER — OFFICE VISIT (OUTPATIENT)
Dept: INTERNAL MEDICINE | Facility: CLINIC | Age: 53
End: 2023-10-31
Payer: COMMERCIAL

## 2023-10-31 VITALS
TEMPERATURE: 98.1 F | SYSTOLIC BLOOD PRESSURE: 110 MMHG | HEIGHT: 66 IN | DIASTOLIC BLOOD PRESSURE: 78 MMHG | BODY MASS INDEX: 30.74 KG/M2 | WEIGHT: 191.3 LBS | HEART RATE: 78 BPM | OXYGEN SATURATION: 99 %

## 2023-10-31 DIAGNOSIS — G43.119 INTRACTABLE MIGRAINE WITH AURA WITHOUT STATUS MIGRAINOSUS: ICD-10-CM

## 2023-10-31 DIAGNOSIS — F51.01 PRIMARY INSOMNIA: ICD-10-CM

## 2023-10-31 DIAGNOSIS — F33.1 MODERATE EPISODE OF RECURRENT MAJOR DEPRESSIVE DISORDER: ICD-10-CM

## 2023-10-31 DIAGNOSIS — G43.009 MIGRAINE WITHOUT AURA AND WITHOUT STATUS MIGRAINOSUS, NOT INTRACTABLE: ICD-10-CM

## 2023-10-31 DIAGNOSIS — E55.9 VITAMIN D DEFICIENCY: Primary | ICD-10-CM

## 2023-10-31 DIAGNOSIS — Z79.4 TYPE 2 DIABETES MELLITUS WITHOUT COMPLICATION, WITH LONG-TERM CURRENT USE OF INSULIN: ICD-10-CM

## 2023-10-31 DIAGNOSIS — F41.9 ANXIETY: ICD-10-CM

## 2023-10-31 DIAGNOSIS — E11.9 TYPE 2 DIABETES MELLITUS WITHOUT COMPLICATION, WITH LONG-TERM CURRENT USE OF INSULIN: ICD-10-CM

## 2023-10-31 RX ORDER — POTASSIUM CHLORIDE 750 MG/1
10 TABLET, FILM COATED, EXTENDED RELEASE ORAL 2 TIMES DAILY
Qty: 180 TABLET | Refills: 0 | Status: SHIPPED | OUTPATIENT
Start: 2023-10-31

## 2023-10-31 RX ORDER — HYDROXYZINE HYDROCHLORIDE 10 MG/1
10 TABLET, FILM COATED ORAL EVERY 4 HOURS PRN
Qty: 60 TABLET | Refills: 5 | Status: SHIPPED | OUTPATIENT
Start: 2023-10-31

## 2023-10-31 RX ORDER — BUSPIRONE HYDROCHLORIDE 5 MG/1
5 TABLET ORAL 3 TIMES DAILY
Qty: 270 TABLET | Refills: 0 | Status: SHIPPED | OUTPATIENT
Start: 2023-10-31

## 2023-10-31 RX ORDER — FREMANEZUMAB-VFRM 225 MG/1.5ML
225 INJECTION SUBCUTANEOUS
Qty: 4.5 ML | Refills: 0 | Status: SHIPPED | OUTPATIENT
Start: 2023-10-31 | End: 2024-10-30

## 2023-10-31 RX ORDER — TRAZODONE HYDROCHLORIDE 50 MG/1
TABLET ORAL
Qty: 180 TABLET | Refills: 0 | Status: SHIPPED | OUTPATIENT
Start: 2023-10-31

## 2023-10-31 RX ORDER — ALBUTEROL SULFATE 90 UG/1
2 AEROSOL, METERED RESPIRATORY (INHALATION) EVERY 4 HOURS PRN
Qty: 54 G | Refills: 0 | Status: SHIPPED | OUTPATIENT
Start: 2023-10-31

## 2023-10-31 RX ORDER — METOPROLOL TARTRATE 50 MG/1
50 TABLET, FILM COATED ORAL 2 TIMES DAILY
Qty: 180 TABLET | Refills: 3 | Status: SHIPPED | OUTPATIENT
Start: 2023-10-31

## 2023-10-31 RX ORDER — DULOXETIN HYDROCHLORIDE 30 MG/1
90 CAPSULE, DELAYED RELEASE ORAL DAILY
Qty: 270 CAPSULE | Refills: 0 | Status: SHIPPED | OUTPATIENT
Start: 2023-10-31 | End: 2024-01-29

## 2023-10-31 RX ORDER — FLUTICASONE PROPIONATE AND SALMETEROL 250; 50 UG/1; UG/1
2 POWDER RESPIRATORY (INHALATION) AS NEEDED
Qty: 3 EACH | Refills: 0 | Status: SHIPPED | OUTPATIENT
Start: 2023-10-31 | End: 2023-11-02 | Stop reason: SDUPTHER

## 2023-10-31 RX ORDER — INSULIN LISPRO 100 [IU]/ML
INJECTION, SOLUTION INTRAVENOUS; SUBCUTANEOUS
Qty: 500 ML | Refills: 5 | Status: SHIPPED | OUTPATIENT
Start: 2023-10-31

## 2023-10-31 RX ORDER — ERGOCALCIFEROL 1.25 MG/1
50000 CAPSULE ORAL WEEKLY
Qty: 12 CAPSULE | Refills: 0 | Status: SHIPPED | OUTPATIENT
Start: 2023-10-31

## 2023-10-31 NOTE — PROGRESS NOTES
"Chief Complaint   Patient presents with    Depression     Needs me refills         HPI  Sigrid Bello is a 53 y.o. female presents for medication refills.  She is moving to [place] this weekend and wants to make sure that she has enough medication until she gets re-established.      The following portions of the patient's history were reviewed and updated as appropriate: allergies, current medications, past family history, past medical history, past social history, past surgical history, and problem list.    Subjective  Review of Systems   Constitutional:  Negative for activity change, appetite change and fatigue.   HENT:  Negative for congestion.    Respiratory:  Negative for cough and shortness of breath.    Cardiovascular:  Negative for chest pain and leg swelling.   Gastrointestinal:  Negative for abdominal pain.   Skin:  Positive for skin lesions.   Neurological:  Negative for dizziness, weakness and confusion.   Psychiatric/Behavioral:  Positive for depressed mood. Negative for behavioral problems and decreased concentration.        Objective  Visit Vitals  /78 (BP Location: Left arm, Patient Position: Sitting)   Pulse 78   Temp 98.1 °F (36.7 °C)   Ht 167.6 cm (66\")   Wt 86.8 kg (191 lb 4.8 oz)   LMP 07/14/2023   SpO2 99%   BMI 30.88 kg/m²        Physical Exam  Vitals and nursing note reviewed.   HENT:      Head: Normocephalic.   Eyes:      Pupils: Pupils are equal, round, and reactive to light.   Pulmonary:      Effort: Pulmonary effort is normal.   Skin:     General: Skin is warm and dry.      Capillary Refill: Capillary refill takes less than 2 seconds.      Comments: Multiple scabs and sores noted all over body, including breasts and abdomen   Neurological:      General: No focal deficit present.      Mental Status: She is alert and oriented to person, place, and time.      Gait: Gait is intact.   Psychiatric:         Attention and Perception: Attention normal.         Mood and Affect: Mood " normal.         Behavior: Behavior normal.          Procedures     Assessment and Plan  Diagnoses and all orders for this visit:    1. Vitamin D deficiency (Primary)  -     vitamin D (ERGOCALCIFEROL) 1.25 MG (07152 UT) capsule capsule; Take 1 capsule by mouth 1 (One) Time Per Week. Saturday's  Dispense: 12 capsule; Refill: 0    2. Moderate episode of recurrent major depressive disorder  -     busPIRone (BUSPAR) 5 MG tablet; Take 1 tablet by mouth 3 (Three) Times a Day.  Dispense: 270 tablet; Refill: 0  -     DULoxetine (Cymbalta) 30 MG capsule; Take 3 capsules by mouth Daily for 90 days.  Dispense: 270 capsule; Refill: 0    3. Intractable migraine with aura without status migrainosus  -     Fremanezumab-vfrm (Ajovy) 225 MG/1.5ML solution auto-injector; Inject 225 mg under the skin into the appropriate area as directed Every 30 (Thirty) Days.  Dispense: 4.5 mL; Refill: 0    4. Type 2 diabetes mellitus without complication, with long-term current use of insulin  -     HumaLOG KwikPen 100 UNIT/ML solution pen-injector; Take 5-10 units TID with meals sliding scale NO MORE THAN 60UNITS DAILY  Dispense: 500 mL; Refill: 5  -     Insulin Glargine (LANTUS SOLOSTAR) 100 UNIT/ML injection pen; Inject 10 Units under the skin into the appropriate area as directed 2 (Two) Times a Day.  Dispense: 6 mL; Refill: 2    5. Anxiety  -     hydrOXYzine (ATARAX) 10 MG tablet; Take 1 tablet by mouth Every 4 (Four) Hours As Needed for Anxiety.  Dispense: 60 tablet; Refill: 5    6. Primary insomnia  -     traZODone (DESYREL) 50 MG tablet; Take 1-2 tablets PO 30 minutes prior to bedtime  Dispense: 180 tablet; Refill: 0    7. Migraine without aura and without status migrainosus, not intractable  -     ubrogepant (UBRELVY) 100 MG tablet; Take 1 tablet by mouth 1 (One) Time As Needed (migraine) for up to 90 days.  Dispense: 16 tablet; Refill: 0    Other orders  -     albuterol sulfate  (90 Base) MCG/ACT inhaler; Inhale 2 puffs Every 4  (Four) Hours As Needed for Wheezing.  Dispense: 54 g; Refill: 0  -     Cariprazine HCl (Vraylar) 3 MG capsule capsule; Take 1 capsule by mouth Daily.  Dispense: 90 capsule; Refill: 0  -     Fluticasone-Salmeterol (Advair Diskus) 250-50 MCG/ACT DISKUS; Inhale 2 puffs As Needed (wheezing).  Dispense: 3 each; Refill: 0  -     metoprolol tartrate (LOPRESSOR) 50 MG tablet; Take 1 tablet by mouth 2 (Two) Times a Day.  Dispense: 180 tablet; Refill: 3  -     potassium chloride (KLOR-CON) 10 MEQ CR tablet; Take 1 tablet by mouth 2 (Two) Times a Day.  Dispense: 180 tablet; Refill: 0    Meds refilled  No follow-up needed because pt is moving to [place]  Anxiety controlled with Atarax/Buspar  Depression controlled with Cymbalta  Sleeping well with Trazodone  A1c well controlled on Humalog/Lantus          TAWNY Gallo

## 2023-11-01 ENCOUNTER — TELEPHONE (OUTPATIENT)
Dept: INTERNAL MEDICINE | Facility: CLINIC | Age: 53
End: 2023-11-01

## 2023-11-01 NOTE — TELEPHONE ENCOUNTER
Caller: Sigrid Bello    Relationship: Self    Best call back number:  [phone]  PLEASE CALL TO LET THE PATIENT KNOW IF THE PAPERWORK IS DONE     What form or medical record are you requesting: LEAVE OF ABSENCE     Who is requesting this form or medical record from you: [facility]     How would you like to receive the form or medical records (pick-up, mail, fax):   FAX TO [employer]     Timeframe paperwork needed: AS SOON AS POSSIBLE     Additional notes:  PATIENT SAID SHE BROUGHT THE PAPERWORK INTO THE OFFICE 10-6-23   SHE CALLED [employer] AND THEY DO NOT HAVE THE PAPERWORK   TO APPROVE HER LEAVE OF ABSENCE

## 2023-11-02 ENCOUNTER — TELEPHONE (OUTPATIENT)
Dept: NEUROLOGY | Facility: CLINIC | Age: 53
End: 2023-11-02
Payer: COMMERCIAL

## 2023-11-02 ENCOUNTER — TELEPHONE (OUTPATIENT)
Dept: INTERNAL MEDICINE | Facility: CLINIC | Age: 53
End: 2023-11-02
Payer: COMMERCIAL

## 2023-11-02 RX ORDER — FLUTICASONE PROPIONATE AND SALMETEROL 250; 50 UG/1; UG/1
1 POWDER RESPIRATORY (INHALATION)
Qty: 3 EACH | Refills: 0 | Status: SHIPPED | OUTPATIENT
Start: 2023-11-02

## 2023-11-02 NOTE — TELEPHONE ENCOUNTER
Pharmacy Name:  [facility]     Reference Number (if applicable):     Pharmacy representative name:      Pharmacy representative phone number: [phone]     What medication are you calling in regards to: Fluticasone-Salmeterol (Advair Diskus) 250-50 MCG/ACT DISKUS     What question does the pharmacy have: CLARIFY DIRECTIONS ON HOW OFTEN PER DAY     Who is the provider that prescribed the medication:  ESSIE BAINS   Additional notes: PLEASE CALL

## 2023-11-02 NOTE — TELEPHONE ENCOUNTER
----- Message from TAWNY Rodriguez sent at 10/13/2023  2:44 PM EDT -----  Megha Matta believes that she has had an MRI brain at [facility] in the last year, can we try to obtain those records? Thanks

## 2023-11-02 NOTE — TELEPHONE ENCOUNTER
[facility] (Send GUILLERMO's to)     [phone]      Faxed GUILLERMO to [facility] Records dept at Fax# listed above for patients most recent MRI of the Brain report to be faxed and disc to be mailed to us.

## 2023-11-03 NOTE — TELEPHONE ENCOUNTER
Caller: Sigrid Bello    Relationship: Self    Best call back number: [phone]    What is the best time to reach you: ANYTIME    Who are you requesting to speak with (clinical staff, provider,  specific staff member): PCP/MA    Do you know the name of the person who called: SIGRID    What was the call regarding: PATIENT IS WANTING A CALLBACK TODAY TO SEE IF THE FORM HAS BEEN FAXED BACK TO [facility] FOR A LEAVE OF ABSENCE. SHE STATED ITS EFFECTING HER PAY. NEEDS TO BE SENT ASAP. PATIENT WANTS A CALLBACK TODAY     Is it okay if the provider responds through MyChart: CALLBACK

## 2023-11-07 ENCOUNTER — TELEPHONE (OUTPATIENT)
Dept: INTERNAL MEDICINE | Facility: CLINIC | Age: 53
End: 2023-11-07

## 2023-11-07 NOTE — TELEPHONE ENCOUNTER
PT CALLED SAYING SHE HAD HER FMLA PAPERWORK SENT TO US A LONG TIME AGO AND NEEDS IT FILLED OUT ASAP BECAUSE IT IS AFFECTING HER JOB. SHE ALSO ASKED IF SOMEONE COULD CALL HER ONCE IT IS DONE.

## 2023-11-28 ENCOUNTER — PATIENT MESSAGE (OUTPATIENT)
Dept: NEUROLOGY | Facility: CLINIC | Age: 53
End: 2023-11-28
Payer: COMMERCIAL

## 2023-11-28 DIAGNOSIS — R56.9 GENERALIZED CONVULSIVE SEIZURES: ICD-10-CM

## 2023-11-28 RX ORDER — LEVETIRACETAM 750 MG/1
750 TABLET ORAL 2 TIMES DAILY
Qty: 60 TABLET | Refills: 0 | Status: SHIPPED | OUTPATIENT
Start: 2023-11-28

## 2023-11-29 NOTE — TELEPHONE ENCOUNTER
"Left detailed message asking which pharmacy she would like script sent to?    [facility]    \"Which pharmacy would you like your medication sent to?\"  "

## 2023-12-04 ENCOUNTER — TELEPHONE (OUTPATIENT)
Dept: INTERNAL MEDICINE | Facility: CLINIC | Age: 53
End: 2023-12-04
Payer: COMMERCIAL

## 2023-12-04 NOTE — TELEPHONE ENCOUNTER
Pt would like to speak with someone about her [facility] paperwork. I faxed the paper work that we had in pt chart. Please call pt back

## 2023-12-13 ENCOUNTER — TELEPHONE (OUTPATIENT)
Dept: INTERNAL MEDICINE | Facility: CLINIC | Age: 53
End: 2023-12-13
Payer: COMMERCIAL

## 2023-12-18 RX ORDER — ALBUTEROL SULFATE 90 UG/1
2 AEROSOL, METERED RESPIRATORY (INHALATION) EVERY 4 HOURS PRN
Qty: 54 G | Refills: 0 | OUTPATIENT
Start: 2023-12-18

## 2023-12-22 ENCOUNTER — TELEPHONE (OUTPATIENT)
Dept: INTERNAL MEDICINE | Facility: CLINIC | Age: 53
End: 2023-12-22

## 2023-12-22 NOTE — TELEPHONE ENCOUNTER
Notified the patient we will not fill any FMLA documents out for her since she has moved to [place]. She needs to establish with a new PCP

## 2023-12-22 NOTE — TELEPHONE ENCOUNTER
Caller: Sigrid Bello    Relationship: Self    Best call back number: [phone]    What form or medical record are you requesting: FMLA PAPERWORK/MED RECORDS TO [facility] [place]    Who is requesting this form or medical record from you: WORK    How would you like to receive the form or medical records (pick-up, mail, fax): FAX. NUMBER SHOULD BE ON PAPERWORK    Timeframe paperwork needed: ASAP    Additional notes: PT STATES THAT [facility] HAS FAXED OVER THINGS AND MED REC NEEDS TO BE FAXED BACK TO THEM ASAP. PLEASE CALL AND LET HER KNOW WHEN THIS CAN BE DONE

## 2024-01-25 DIAGNOSIS — F33.1 MODERATE EPISODE OF RECURRENT MAJOR DEPRESSIVE DISORDER: ICD-10-CM

## 2024-01-25 RX ORDER — POTASSIUM CHLORIDE 750 MG/1
10 TABLET, FILM COATED, EXTENDED RELEASE ORAL 2 TIMES DAILY
Qty: 180 TABLET | Refills: 0 | OUTPATIENT
Start: 2024-01-25

## 2024-01-25 RX ORDER — DULOXETIN HYDROCHLORIDE 30 MG/1
90 CAPSULE, DELAYED RELEASE ORAL DAILY
Qty: 270 CAPSULE | Refills: 0 | OUTPATIENT
Start: 2024-01-25

## (undated) DEVICE — TIP COVER ACCESSORY

## (undated) DEVICE — MANIP UTER RUMI 2 KOH EFFICIENT 3CM BL

## (undated) DEVICE — BOWL UTIL STRL 32OZ

## (undated) DEVICE — BLADELESS OBTURATOR: Brand: WECK VISTA

## (undated) DEVICE — ADHS LIQ MASTISOL 2/3ML

## (undated) DEVICE — ANTIBACTERIAL UNDYED BRAIDED (POLYGLACTIN 910), SYNTHETIC ABSORBABLE SUTURE: Brand: COATED VICRYL

## (undated) DEVICE — GLV SURG DERMASSURE GRN LF PF 7.0

## (undated) DEVICE — ST TBG CONN PNEUMOCLEAR EVAC SMOKE HEAT/HUMID

## (undated) DEVICE — LAPAROVUE VISIBILITY SYSTEM LAPAROSCOPIC SOLUTIONS: Brand: LAPAROVUE

## (undated) DEVICE — COLUMN DRAPE

## (undated) DEVICE — PK MAJ GYN DAVINCI 10

## (undated) DEVICE — NDL HYPO ECLPS SFTY 22G 1 1/2IN

## (undated) DEVICE — DRSNG WND BORDR/ADHS NONADHR/GZ LF 2X2IN STRL

## (undated) DEVICE — ARM DRAPE

## (undated) DEVICE — CANNULA SEAL

## (undated) DEVICE — SCRB SURG BACTOSHIELD CHG 4PCT 4OZ

## (undated) DEVICE — TUBING, SUCTION, 1/4" X 10', STRAIGHT: Brand: MEDLINE

## (undated) DEVICE — SUT VIC 2/0 CT2 27IN J269H

## (undated) DEVICE — SNAP KOVER: Brand: UNBRANDED

## (undated) DEVICE — STRIP,CLOSURE,WOUND,MEDI-STRIP,1/2X4: Brand: MEDLINE

## (undated) DEVICE — SUT VICYL PLS CTD ANTIB BR 1 27IN VIL

## (undated) DEVICE — SUT VIC 12X27 D8116 BX/12

## (undated) DEVICE — ANTIBACTERIAL UNDYED BRAIDED (POLYGLACTIN 910), SYNTHETIC ABSORBABLE SURGICAL SUTURE: Brand: COATED VICRYL

## (undated) DEVICE — SLENDER TIP INSTRUMENT: Brand: VITAL VUE

## (undated) DEVICE — BLANKT WARM UPPR/BDY ARM/OUT 57X196CM

## (undated) DEVICE — PATIENT RETURN ELECTRODE, SINGLE-USE, CONTACT QUALITY MONITORING, ADULT, WITH 9FT CORD, FOR PATIENTS WEIGING OVER 33LBS. (15KG): Brand: MEGADYNE

## (undated) DEVICE — MANIP UTER RUMI TP 5.1MM 6CM LAV

## (undated) DEVICE — APPL CHLORAPREP TINTED 26ML TEAL

## (undated) DEVICE — DRAPE,TOP,102X53,STERILE: Brand: MEDLINE

## (undated) DEVICE — LEX VAGINAL HYSTERECTOMY: Brand: MEDLINE INDUSTRIES, INC.

## (undated) DEVICE — TRENDELENBURG WINGPAD POSITIONING KIT DELUXE - WITHOUT BODY STRAP: Brand: SOULE MEDICAL

## (undated) DEVICE — STRAP STIRUP WO/RNG 19X3.5IN DISP